# Patient Record
Sex: MALE | Race: BLACK OR AFRICAN AMERICAN | NOT HISPANIC OR LATINO | Employment: OTHER | URBAN - METROPOLITAN AREA
[De-identification: names, ages, dates, MRNs, and addresses within clinical notes are randomized per-mention and may not be internally consistent; named-entity substitution may affect disease eponyms.]

---

## 2017-10-09 ENCOUNTER — APPOINTMENT (OUTPATIENT)
Dept: GENERAL RADIOLOGY | Facility: CLINIC | Age: 57
DRG: 871 | End: 2017-10-09
Attending: EMERGENCY MEDICINE
Payer: COMMERCIAL

## 2017-10-09 ENCOUNTER — HOSPITAL ENCOUNTER (INPATIENT)
Facility: CLINIC | Age: 57
LOS: 8 days | Discharge: HOME | DRG: 871 | End: 2017-10-18
Attending: EMERGENCY MEDICINE | Admitting: INTERNAL MEDICINE
Payer: COMMERCIAL

## 2017-10-09 DIAGNOSIS — K59.00 CONSTIPATION, UNSPECIFIED CONSTIPATION TYPE: ICD-10-CM

## 2017-10-09 DIAGNOSIS — I50.21 ACUTE SYSTOLIC CONGESTIVE HEART FAILURE (H): Primary | ICD-10-CM

## 2017-10-09 DIAGNOSIS — K21.9 GASTROESOPHAGEAL REFLUX DISEASE, ESOPHAGITIS PRESENCE NOT SPECIFIED: ICD-10-CM

## 2017-10-09 DIAGNOSIS — A41.9 SEPTIC SHOCK (H): ICD-10-CM

## 2017-10-09 DIAGNOSIS — R73.9 HYPERGLYCEMIA: ICD-10-CM

## 2017-10-09 DIAGNOSIS — R65.21 SEPTIC SHOCK (H): ICD-10-CM

## 2017-10-09 DIAGNOSIS — J18.9 COMMUNITY ACQUIRED PNEUMONIA OF RIGHT LOWER LOBE OF LUNG: ICD-10-CM

## 2017-10-09 DIAGNOSIS — I50.9 CONGESTIVE HEART FAILURE, UNSPECIFIED CONGESTIVE HEART FAILURE CHRONICITY, UNSPECIFIED CONGESTIVE HEART FAILURE TYPE: ICD-10-CM

## 2017-10-09 DIAGNOSIS — E87.6 HYPOKALEMIA: ICD-10-CM

## 2017-10-09 LAB
ALBUMIN SERPL-MCNC: 3 G/DL (ref 3.4–5)
ALP SERPL-CCNC: 121 U/L (ref 40–150)
ALT SERPL W P-5'-P-CCNC: 13 U/L (ref 0–70)
ANION GAP SERPL CALCULATED.3IONS-SCNC: 12 MMOL/L (ref 3–14)
AST SERPL W P-5'-P-CCNC: 18 U/L (ref 0–45)
BASOPHILS # BLD AUTO: 0 10E9/L (ref 0–0.2)
BASOPHILS NFR BLD AUTO: 0.1 %
BILIRUB SERPL-MCNC: 1.4 MG/DL (ref 0.2–1.3)
BUN SERPL-MCNC: 7 MG/DL (ref 7–30)
CALCIUM SERPL-MCNC: 8.7 MG/DL (ref 8.5–10.1)
CHLORIDE SERPL-SCNC: 98 MMOL/L (ref 94–109)
CO2 BLDCOV-SCNC: 20 MMOL/L (ref 21–28)
CO2 SERPL-SCNC: 22 MMOL/L (ref 20–32)
CREAT SERPL-MCNC: 0.84 MG/DL (ref 0.66–1.25)
DIFFERENTIAL METHOD BLD: ABNORMAL
EOSINOPHIL # BLD AUTO: 0 10E9/L (ref 0–0.7)
EOSINOPHIL NFR BLD AUTO: 0 %
ERYTHROCYTE [DISTWIDTH] IN BLOOD BY AUTOMATED COUNT: 14.7 % (ref 10–15)
ETHANOL SERPL-MCNC: 0.04 G/DL
GFR SERPL CREATININE-BSD FRML MDRD: >90 ML/MIN/1.7M2
GLUCOSE SERPL-MCNC: 130 MG/DL (ref 70–99)
HCT VFR BLD AUTO: 38.1 % (ref 40–53)
HGB BLD-MCNC: 12.9 G/DL (ref 13.3–17.7)
IMM GRANULOCYTES # BLD: 0.3 10E9/L (ref 0–0.4)
IMM GRANULOCYTES NFR BLD: 1.2 %
LACTATE BLD-SCNC: 4.3 MMOL/L (ref 0.7–2.1)
LACTATE SERPL-SCNC: 4.4 MMOL/L (ref 0.4–2)
LYMPHOCYTES # BLD AUTO: 1.1 10E9/L (ref 0.8–5.3)
LYMPHOCYTES NFR BLD AUTO: 5.2 %
MCH RBC QN AUTO: 31.1 PG (ref 26.5–33)
MCHC RBC AUTO-ENTMCNC: 33.9 G/DL (ref 31.5–36.5)
MCV RBC AUTO: 92 FL (ref 78–100)
MONOCYTES # BLD AUTO: 1 10E9/L (ref 0–1.3)
MONOCYTES NFR BLD AUTO: 4.5 %
NEUTROPHILS # BLD AUTO: 18.7 10E9/L (ref 1.6–8.3)
NEUTROPHILS NFR BLD AUTO: 89 %
NRBC # BLD AUTO: 0 10*3/UL
NRBC BLD AUTO-RTO: 0 /100
PCO2 BLDV: 28 MM HG (ref 40–50)
PH BLDV: 7.46 PH (ref 7.32–7.43)
PLATELET # BLD AUTO: 183 10E9/L (ref 150–450)
PO2 BLDV: 36 MM HG (ref 25–47)
POTASSIUM SERPL-SCNC: 3.2 MMOL/L (ref 3.4–5.3)
PROT SERPL-MCNC: 8 G/DL (ref 6.8–8.8)
RBC # BLD AUTO: 4.15 10E12/L (ref 4.4–5.9)
SAO2 % BLDV FROM PO2: 74 %
SODIUM SERPL-SCNC: 132 MMOL/L (ref 133–144)
TROPONIN I SERPL-MCNC: 0.03 UG/L (ref 0–0.04)
WBC # BLD AUTO: 21 10E9/L (ref 4–11)

## 2017-10-09 PROCEDURE — 99285 EMERGENCY DEPT VISIT HI MDM: CPT | Mod: 25

## 2017-10-09 PROCEDURE — 80320 DRUG SCREEN QUANTALCOHOLS: CPT | Performed by: EMERGENCY MEDICINE

## 2017-10-09 PROCEDURE — 36415 COLL VENOUS BLD VENIPUNCTURE: CPT

## 2017-10-09 PROCEDURE — 85025 COMPLETE CBC W/AUTO DIFF WBC: CPT | Performed by: EMERGENCY MEDICINE

## 2017-10-09 PROCEDURE — 82803 BLOOD GASES ANY COMBINATION: CPT

## 2017-10-09 PROCEDURE — 93005 ELECTROCARDIOGRAM TRACING: CPT

## 2017-10-09 PROCEDURE — 25000128 H RX IP 250 OP 636: Performed by: EMERGENCY MEDICINE

## 2017-10-09 PROCEDURE — 84484 ASSAY OF TROPONIN QUANT: CPT | Performed by: EMERGENCY MEDICINE

## 2017-10-09 PROCEDURE — 96361 HYDRATE IV INFUSION ADD-ON: CPT

## 2017-10-09 PROCEDURE — 83605 ASSAY OF LACTIC ACID: CPT | Performed by: EMERGENCY MEDICINE

## 2017-10-09 PROCEDURE — 83605 ASSAY OF LACTIC ACID: CPT

## 2017-10-09 PROCEDURE — 87631 RESP VIRUS 3-5 TARGETS: CPT | Performed by: EMERGENCY MEDICINE

## 2017-10-09 PROCEDURE — 71010 XR CHEST PORT 1 VW: CPT

## 2017-10-09 PROCEDURE — 80053 COMPREHEN METABOLIC PANEL: CPT | Performed by: EMERGENCY MEDICINE

## 2017-10-09 PROCEDURE — 25000132 ZZH RX MED GY IP 250 OP 250 PS 637: Performed by: EMERGENCY MEDICINE

## 2017-10-09 PROCEDURE — 87040 BLOOD CULTURE FOR BACTERIA: CPT | Performed by: EMERGENCY MEDICINE

## 2017-10-09 RX ORDER — ACETAMINOPHEN 500 MG
1000 TABLET ORAL ONCE
Status: COMPLETED | OUTPATIENT
Start: 2017-10-09 | End: 2017-10-09

## 2017-10-09 RX ORDER — LIDOCAINE 40 MG/G
CREAM TOPICAL
Status: DISCONTINUED | OUTPATIENT
Start: 2017-10-09 | End: 2017-10-10

## 2017-10-09 RX ORDER — IPRATROPIUM BROMIDE AND ALBUTEROL SULFATE 2.5; .5 MG/3ML; MG/3ML
SOLUTION RESPIRATORY (INHALATION)
Status: DISCONTINUED
Start: 2017-10-09 | End: 2017-10-09 | Stop reason: WASHOUT

## 2017-10-09 RX ADMIN — SODIUM CHLORIDE 1000 ML: 9 INJECTION, SOLUTION INTRAVENOUS at 23:34

## 2017-10-09 RX ADMIN — SODIUM CHLORIDE 1000 ML: 9 INJECTION, SOLUTION INTRAVENOUS at 22:20

## 2017-10-09 RX ADMIN — ACETAMINOPHEN 1000 MG: 500 TABLET, FILM COATED ORAL at 23:30

## 2017-10-09 NOTE — IP AVS SNAPSHOT
[place] [id number] [facility]    [address]    [facility] [address]    Phone:  [phone]    Fax:  [phone]                                       After Visit Summary   10/9/2017    Alfa Cm    MRN: [id number]           After Visit Summary Signature Page     I have received my discharge instructions, and my questions have been answered. I have discussed any challenges I see with this plan with the nurse or doctor.    ..........................................................................................................................................  Patient/Patient Representative Signature      ..........................................................................................................................................  Patient Representative Print Name and Relationship to Patient    ..................................................               ................................................  Date                                            Time    ..........................................................................................................................................  Reviewed by Signature/Title    ...................................................              ..............................................  Date                                                            Time

## 2017-10-09 NOTE — LETTER
Transition Communication Hand-off for Care Transitions to Next Level of Care Provider    Name: Alfa Cm  MRN #: [id number]  Primary Care Provider: Tulio Arango  Primary Care MD Name: Red Nba  Primary Clinic: [address] [facility] [address]  Primary Care Clinic Name: [facility]  Reason for Hospitalization:  Hypokalemia [E87.6]  Septic shock (H) [A41.9, R65.21]  Community acquired pneumonia of right lower lobe of lung (H) [J18.1]  Admit Date/Time: 10/9/2017  9:59 PM  Discharge Date: 10/18/77  Payor Source: Payor: HUMANA / Plan: HUMANA MEDICARE ADVANTAGE / Product Type: Medicare /     Readmission Assessment Measure (RALF) Risk Score/category: Average    Reason for Communication Hand-off Referral: Admission diagnoses: CHF--NEW    Discharge Plan: Home to self care with spouse     Concern for non-adherence with plan of care:   NO  Discharge Needs Assessment:  Needs       Most Recent Value    Anticipated Changes Related to Illness none    Equipment Currently Used at Home none    # of Referrals Placed by CTS Scheduled Follow-up appointments        Follow-up specialty is recommended: Yes  CARDIOLOGY.    Follow-up plan:  Future Appointments  Date Time Provider Department Center   10/24/2017 9:00 AM Tulio Arango MD [facility]   11/1/2017 2:30 PM Radha Arango APRN CNP [facility]   11/20/2017 8:45 AM Alvarado Carson MD [facility]       Any outstanding tests or procedures:    Procedures     Future Labs/Procedures    Oxygen Adult     Comments:    New Home Oxygen Order 2 liter(s) by nasal cannula continuously at rest and 4 liter(s) by nasal cannula when active with use of portable tank. Expected treatment length is 2 months.. Test on conserving device as applicable.    Patients who qualify for home O2 coverage under the CMS guidelines require ABG tests or O2 sat readings obtained closest to, but no earlier than 2 days prior to the discharge, as evidence of the need for  home oxygen therapy. Testing must be performed while patient is in the chronic stable state. See notes for O2 sats.    I certify that this patient, Alfa Cm has been under my care and that I, or a nurse practitioner or physician's assistant working with me, had a face-to-face encounter that meets the face-to-face encounter requirements with this patient on 10/18/2017. The patient, Alfa Cm was evaluated or treated in whole, or in part, for the following medical condition, which necessitates the use of the ordered oxygen. Treatment Diagnosis: Pneumonia    Attending Provider: Jorge L Munoz DO [facility]  Physician signature: See electronic signature associated with these discharge orders  Date of Order: October 18, 2017                Key Recommendations:  We discussed his PNA and CHF dc. Action plan given and discussed for PNA. Pt states he does not have CHF. He states he is supposed to follow low salt diet. We reviewed importance of following low salt diet with heart conditions and discussed daily weights. Pt was given a scale for home use and instructed on daily weights.  He was DC'd home on new O2. F/U appointment scheduled with Dr. Arango - this will be pt new PCP.    Destiny Ramesh, RN,BSN, CTS  [facility]  Care Coordination  [phone]      AVS/Discharge Summary is the source of truth; this is a helpful guide for improved communication of patient story

## 2017-10-10 PROBLEM — A41.9 SEPSIS (H): Status: ACTIVE | Noted: 2017-10-10

## 2017-10-10 LAB
ALBUMIN SERPL-MCNC: 2.6 G/DL (ref 3.4–5)
ALBUMIN UR-MCNC: 30 MG/DL
ALP SERPL-CCNC: 93 U/L (ref 40–150)
ALT SERPL W P-5'-P-CCNC: 18 U/L (ref 0–70)
ANION GAP SERPL CALCULATED.3IONS-SCNC: 5 MMOL/L (ref 3–14)
ANION GAP SERPL CALCULATED.3IONS-SCNC: 7 MMOL/L (ref 3–14)
APPEARANCE UR: CLEAR
AST SERPL W P-5'-P-CCNC: 42 U/L (ref 0–45)
BILIRUB SERPL-MCNC: 1.8 MG/DL (ref 0.2–1.3)
BILIRUB UR QL STRIP: NEGATIVE
BUN SERPL-MCNC: 7 MG/DL (ref 7–30)
BUN SERPL-MCNC: 7 MG/DL (ref 7–30)
C DIFF TOX B STL QL: NEGATIVE
CALCIUM SERPL-MCNC: 7.3 MG/DL (ref 8.5–10.1)
CALCIUM SERPL-MCNC: 7.7 MG/DL (ref 8.5–10.1)
CHLORIDE SERPL-SCNC: 107 MMOL/L (ref 94–109)
CHLORIDE SERPL-SCNC: 108 MMOL/L (ref 94–109)
CO2 SERPL-SCNC: 22 MMOL/L (ref 20–32)
CO2 SERPL-SCNC: 23 MMOL/L (ref 20–32)
COLOR UR AUTO: YELLOW
CREAT SERPL-MCNC: 0.7 MG/DL (ref 0.66–1.25)
CREAT SERPL-MCNC: 0.78 MG/DL (ref 0.66–1.25)
ERYTHROCYTE [DISTWIDTH] IN BLOOD BY AUTOMATED COUNT: 14.8 % (ref 10–15)
FLUAV+FLUBV RNA SPEC QL NAA+PROBE: NEGATIVE
GFR SERPL CREATININE-BSD FRML MDRD: >90 ML/MIN/1.7M2
GFR SERPL CREATININE-BSD FRML MDRD: >90 ML/MIN/1.7M2
GLUCOSE BLDC GLUCOMTR-MCNC: 107 MG/DL (ref 70–99)
GLUCOSE BLDC GLUCOMTR-MCNC: 112 MG/DL (ref 70–99)
GLUCOSE BLDC GLUCOMTR-MCNC: 150 MG/DL (ref 70–99)
GLUCOSE BLDC GLUCOMTR-MCNC: 205 MG/DL (ref 70–99)
GLUCOSE BLDC GLUCOMTR-MCNC: 229 MG/DL (ref 70–99)
GLUCOSE SERPL-MCNC: 149 MG/DL (ref 70–99)
GLUCOSE SERPL-MCNC: 154 MG/DL (ref 70–99)
GLUCOSE UR STRIP-MCNC: NEGATIVE MG/DL
GRAM STN SPEC: NORMAL
HCT VFR BLD AUTO: 37 % (ref 40–53)
HGB BLD-MCNC: 11.8 G/DL (ref 13.3–17.7)
HGB UR QL STRIP: ABNORMAL
INTERPRETATION ECG - MUSE: NORMAL
KETONES UR STRIP-MCNC: NEGATIVE MG/DL
LACTATE BLD-SCNC: 1.6 MMOL/L (ref 0.7–2)
LACTATE BLD-SCNC: 2.8 MMOL/L (ref 0.7–2)
LACTATE SERPL-SCNC: 2.5 MMOL/L (ref 0.4–2)
LEUKOCYTE ESTERASE UR QL STRIP: NEGATIVE
Lab: NORMAL
MAGNESIUM SERPL-MCNC: 1.3 MG/DL (ref 1.6–2.3)
MAGNESIUM SERPL-MCNC: 2.8 MG/DL (ref 1.6–2.3)
MCH RBC QN AUTO: 30.6 PG (ref 26.5–33)
MCHC RBC AUTO-ENTMCNC: 31.9 G/DL (ref 31.5–36.5)
MCV RBC AUTO: 96 FL (ref 78–100)
MRSA DNA SPEC QL NAA+PROBE: NEGATIVE
MUCOUS THREADS #/AREA URNS LPF: PRESENT /LPF
NITRATE UR QL: NEGATIVE
PH UR STRIP: 6 PH (ref 5–7)
PHOSPHATE SERPL-MCNC: 1 MG/DL (ref 2.5–4.5)
PHOSPHATE SERPL-MCNC: 1.2 MG/DL (ref 2.5–4.5)
PHOSPHATE SERPL-MCNC: 2.4 MG/DL (ref 2.5–4.5)
PLATELET # BLD AUTO: 147 10E9/L (ref 150–450)
POTASSIUM SERPL-SCNC: 3.6 MMOL/L (ref 3.4–5.3)
POTASSIUM SERPL-SCNC: 4.1 MMOL/L (ref 3.4–5.3)
PROT SERPL-MCNC: 7.1 G/DL (ref 6.8–8.8)
RBC # BLD AUTO: 3.85 10E12/L (ref 4.4–5.9)
RBC #/AREA URNS AUTO: 5 /HPF (ref 0–2)
RSV RNA SPEC NAA+PROBE: NEGATIVE
RSV RNA SPEC NAA+PROBE: NEGATIVE
SODIUM SERPL-SCNC: 135 MMOL/L (ref 133–144)
SODIUM SERPL-SCNC: 137 MMOL/L (ref 133–144)
SOURCE: ABNORMAL
SP GR UR STRIP: 1.01 (ref 1–1.03)
SPECIMEN SOURCE: NORMAL
SQUAMOUS #/AREA URNS AUTO: <1 /HPF (ref 0–1)
UROBILINOGEN UR STRIP-MCNC: 2 MG/DL (ref 0–2)
WBC # BLD AUTO: 20.7 10E9/L (ref 4–11)
WBC #/AREA URNS AUTO: 2 /HPF (ref 0–2)

## 2017-10-10 PROCEDURE — 96368 THER/DIAG CONCURRENT INF: CPT

## 2017-10-10 PROCEDURE — 96365 THER/PROPH/DIAG IV INF INIT: CPT

## 2017-10-10 PROCEDURE — 83605 ASSAY OF LACTIC ACID: CPT | Performed by: INTERNAL MEDICINE

## 2017-10-10 PROCEDURE — 25000128 H RX IP 250 OP 636: Performed by: HOSPITALIST

## 2017-10-10 PROCEDURE — 84100 ASSAY OF PHOSPHORUS: CPT | Performed by: INTERNAL MEDICINE

## 2017-10-10 PROCEDURE — 25000125 ZZHC RX 250: Performed by: EMERGENCY MEDICINE

## 2017-10-10 PROCEDURE — 83605 ASSAY OF LACTIC ACID: CPT | Performed by: EMERGENCY MEDICINE

## 2017-10-10 PROCEDURE — 82565 ASSAY OF CREATININE: CPT | Performed by: INTERNAL MEDICINE

## 2017-10-10 PROCEDURE — 40000275 ZZH STATISTIC RCP TIME EA 10 MIN

## 2017-10-10 PROCEDURE — 87493 C DIFF AMPLIFIED PROBE: CPT | Performed by: HOSPITALIST

## 2017-10-10 PROCEDURE — 85027 COMPLETE CBC AUTOMATED: CPT | Performed by: INTERNAL MEDICINE

## 2017-10-10 PROCEDURE — 81001 URINALYSIS AUTO W/SCOPE: CPT | Performed by: EMERGENCY MEDICINE

## 2017-10-10 PROCEDURE — 83735 ASSAY OF MAGNESIUM: CPT | Performed by: HOSPITALIST

## 2017-10-10 PROCEDURE — 25000132 ZZH RX MED GY IP 250 OP 250 PS 637: Performed by: INTERNAL MEDICINE

## 2017-10-10 PROCEDURE — 87641 MR-STAPH DNA AMP PROBE: CPT | Performed by: INTERNAL MEDICINE

## 2017-10-10 PROCEDURE — 36415 COLL VENOUS BLD VENIPUNCTURE: CPT | Performed by: INTERNAL MEDICINE

## 2017-10-10 PROCEDURE — 94640 AIRWAY INHALATION TREATMENT: CPT

## 2017-10-10 PROCEDURE — 99207 ZZC APP CREDIT; MD BILLING SHARED VISIT: CPT | Performed by: HOSPITALIST

## 2017-10-10 PROCEDURE — 36415 COLL VENOUS BLD VENIPUNCTURE: CPT | Performed by: HOSPITALIST

## 2017-10-10 PROCEDURE — 20000003 ZZH R&B ICU

## 2017-10-10 PROCEDURE — 25000128 H RX IP 250 OP 636: Performed by: INTERNAL MEDICINE

## 2017-10-10 PROCEDURE — 83735 ASSAY OF MAGNESIUM: CPT | Performed by: INTERNAL MEDICINE

## 2017-10-10 PROCEDURE — 25000131 ZZH RX MED GY IP 250 OP 636 PS 637: Performed by: HOSPITALIST

## 2017-10-10 PROCEDURE — 87631 RESP VIRUS 3-5 TARGETS: CPT | Performed by: INTERNAL MEDICINE

## 2017-10-10 PROCEDURE — 25000125 ZZHC RX 250: Performed by: INTERNAL MEDICINE

## 2017-10-10 PROCEDURE — 00000146 ZZHCL STATISTIC GLUCOSE BY METER IP

## 2017-10-10 PROCEDURE — 99223 1ST HOSP IP/OBS HIGH 75: CPT | Mod: AI | Performed by: INTERNAL MEDICINE

## 2017-10-10 PROCEDURE — 80053 COMPREHEN METABOLIC PANEL: CPT | Performed by: INTERNAL MEDICINE

## 2017-10-10 PROCEDURE — 94640 AIRWAY INHALATION TREATMENT: CPT | Mod: 76

## 2017-10-10 PROCEDURE — 25000125 ZZHC RX 250: Performed by: HOSPITALIST

## 2017-10-10 PROCEDURE — 87070 CULTURE OTHR SPECIMN AEROBIC: CPT | Performed by: INTERNAL MEDICINE

## 2017-10-10 PROCEDURE — 84100 ASSAY OF PHOSPHORUS: CPT | Performed by: HOSPITALIST

## 2017-10-10 PROCEDURE — 25000128 H RX IP 250 OP 636: Performed by: EMERGENCY MEDICINE

## 2017-10-10 PROCEDURE — 87640 STAPH A DNA AMP PROBE: CPT | Performed by: INTERNAL MEDICINE

## 2017-10-10 PROCEDURE — 80048 BASIC METABOLIC PNL TOTAL CA: CPT | Performed by: HOSPITALIST

## 2017-10-10 PROCEDURE — 87205 SMEAR GRAM STAIN: CPT | Performed by: INTERNAL MEDICINE

## 2017-10-10 PROCEDURE — 25000132 ZZH RX MED GY IP 250 OP 250 PS 637: Performed by: HOSPITALIST

## 2017-10-10 RX ORDER — CLONIDINE HYDROCHLORIDE 0.1 MG/1
0.2 TABLET ORAL 2 TIMES DAILY
Status: DISCONTINUED | OUTPATIENT
Start: 2017-10-10 | End: 2017-10-12

## 2017-10-10 RX ORDER — LABETALOL HYDROCHLORIDE 5 MG/ML
10 INJECTION, SOLUTION INTRAVENOUS
Status: DISCONTINUED | OUTPATIENT
Start: 2017-10-10 | End: 2017-10-18 | Stop reason: HOSPADM

## 2017-10-10 RX ORDER — ASPIRIN 81 MG/1
81 TABLET, CHEWABLE ORAL DAILY
Status: DISCONTINUED | OUTPATIENT
Start: 2017-10-10 | End: 2017-10-18 | Stop reason: HOSPADM

## 2017-10-10 RX ORDER — POTASSIUM CHLORIDE 7.45 MG/ML
10 INJECTION INTRAVENOUS
Status: DISCONTINUED | OUTPATIENT
Start: 2017-10-10 | End: 2017-10-11

## 2017-10-10 RX ORDER — LISINOPRIL 20 MG/1
20 TABLET ORAL DAILY
COMMUNITY
End: 2017-10-31

## 2017-10-10 RX ORDER — PANTOPRAZOLE SODIUM 40 MG/1
40 TABLET, DELAYED RELEASE ORAL EVERY MORNING
Status: DISCONTINUED | OUTPATIENT
Start: 2017-10-10 | End: 2017-10-18 | Stop reason: HOSPADM

## 2017-10-10 RX ORDER — POTASSIUM CL/LIDO/0.9 % NACL 10MEQ/0.1L
10 INTRAVENOUS SOLUTION, PIGGYBACK (ML) INTRAVENOUS
Status: DISCONTINUED | OUTPATIENT
Start: 2017-10-10 | End: 2017-10-18 | Stop reason: HOSPADM

## 2017-10-10 RX ORDER — ALBUTEROL SULFATE 90 UG/1
1-2 AEROSOL, METERED RESPIRATORY (INHALATION) EVERY 6 HOURS PRN
Status: DISCONTINUED | OUTPATIENT
Start: 2017-10-10 | End: 2017-10-18 | Stop reason: HOSPADM

## 2017-10-10 RX ORDER — MULTIVITAMIN,THERAPEUTIC
1 TABLET ORAL DAILY
COMMUNITY

## 2017-10-10 RX ORDER — FOLIC ACID 1 MG/1
1 TABLET ORAL DAILY
Status: DISCONTINUED | OUTPATIENT
Start: 2017-10-10 | End: 2017-10-18 | Stop reason: HOSPADM

## 2017-10-10 RX ORDER — LANOLIN ALCOHOL/MO/W.PET/CERES
100 CREAM (GRAM) TOPICAL DAILY
Status: DISCONTINUED | OUTPATIENT
Start: 2017-10-10 | End: 2017-10-18 | Stop reason: HOSPADM

## 2017-10-10 RX ORDER — POTASSIUM CHLORIDE 29.8 MG/ML
20 INJECTION INTRAVENOUS
Status: DISCONTINUED | OUTPATIENT
Start: 2017-10-10 | End: 2017-10-11

## 2017-10-10 RX ORDER — CEFTRIAXONE 2 G/1
2 INJECTION, POWDER, FOR SOLUTION INTRAMUSCULAR; INTRAVENOUS EVERY 24 HOURS
Status: DISCONTINUED | OUTPATIENT
Start: 2017-10-10 | End: 2017-10-18 | Stop reason: HOSPADM

## 2017-10-10 RX ORDER — POTASSIUM CHLORIDE 1.5 G/1.58G
20-40 POWDER, FOR SOLUTION ORAL
Status: DISCONTINUED | OUTPATIENT
Start: 2017-10-10 | End: 2017-10-18 | Stop reason: HOSPADM

## 2017-10-10 RX ORDER — IPRATROPIUM BROMIDE AND ALBUTEROL SULFATE 2.5; .5 MG/3ML; MG/3ML
3 SOLUTION RESPIRATORY (INHALATION) ONCE
Status: COMPLETED | OUTPATIENT
Start: 2017-10-10 | End: 2017-10-10

## 2017-10-10 RX ORDER — DEXTROSE MONOHYDRATE 25 G/50ML
25-50 INJECTION, SOLUTION INTRAVENOUS
Status: DISCONTINUED | OUTPATIENT
Start: 2017-10-10 | End: 2017-10-18 | Stop reason: HOSPADM

## 2017-10-10 RX ORDER — FERROUS SULFATE 325(65) MG
325 TABLET ORAL
COMMUNITY
End: 2018-01-11

## 2017-10-10 RX ORDER — SODIUM CHLORIDE 9 MG/ML
INJECTION, SOLUTION INTRAVENOUS CONTINUOUS
Status: ACTIVE | OUTPATIENT
Start: 2017-10-10 | End: 2017-10-10

## 2017-10-10 RX ORDER — AMOXICILLIN 250 MG
1-2 CAPSULE ORAL 2 TIMES DAILY PRN
Status: DISCONTINUED | OUTPATIENT
Start: 2017-10-10 | End: 2017-10-18 | Stop reason: HOSPADM

## 2017-10-10 RX ORDER — POTASSIUM CHLORIDE 29.8 MG/ML
20 INJECTION INTRAVENOUS
Status: DISCONTINUED | OUTPATIENT
Start: 2017-10-10 | End: 2017-10-18 | Stop reason: HOSPADM

## 2017-10-10 RX ORDER — LORAZEPAM 0.5 MG/1
.5-1 TABLET ORAL EVERY 4 HOURS PRN
Status: DISCONTINUED | OUTPATIENT
Start: 2017-10-10 | End: 2017-10-18 | Stop reason: HOSPADM

## 2017-10-10 RX ORDER — POTASSIUM CHLORIDE 1.5 G/1.58G
20-40 POWDER, FOR SOLUTION ORAL
Status: DISCONTINUED | OUTPATIENT
Start: 2017-10-10 | End: 2017-10-11

## 2017-10-10 RX ORDER — METOPROLOL TARTRATE 1 MG/ML
5 INJECTION, SOLUTION INTRAVENOUS EVERY 4 HOURS PRN
Status: DISCONTINUED | OUTPATIENT
Start: 2017-10-10 | End: 2017-10-12

## 2017-10-10 RX ORDER — POTASSIUM CHLORIDE 1500 MG/1
20-40 TABLET, EXTENDED RELEASE ORAL
Status: DISCONTINUED | OUTPATIENT
Start: 2017-10-10 | End: 2017-10-11

## 2017-10-10 RX ORDER — LISINOPRIL 20 MG/1
20 TABLET ORAL DAILY
Status: DISCONTINUED | OUTPATIENT
Start: 2017-10-11 | End: 2017-10-10

## 2017-10-10 RX ORDER — ATORVASTATIN CALCIUM 40 MG/1
40 TABLET, FILM COATED ORAL DAILY
Status: DISCONTINUED | OUTPATIENT
Start: 2017-10-10 | End: 2017-10-18 | Stop reason: HOSPADM

## 2017-10-10 RX ORDER — POTASSIUM CL/LIDO/0.9 % NACL 10MEQ/0.1L
10 INTRAVENOUS SOLUTION, PIGGYBACK (ML) INTRAVENOUS
Status: DISCONTINUED | OUTPATIENT
Start: 2017-10-10 | End: 2017-10-11

## 2017-10-10 RX ORDER — HALOPERIDOL 5 MG/ML
2.5-5 INJECTION INTRAMUSCULAR
Status: DISCONTINUED | OUTPATIENT
Start: 2017-10-10 | End: 2017-10-14

## 2017-10-10 RX ORDER — ONDANSETRON 4 MG/1
4 TABLET, ORALLY DISINTEGRATING ORAL EVERY 6 HOURS PRN
Status: DISCONTINUED | OUTPATIENT
Start: 2017-10-10 | End: 2017-10-18 | Stop reason: HOSPADM

## 2017-10-10 RX ORDER — MAGNESIUM SULFATE HEPTAHYDRATE 40 MG/ML
4 INJECTION, SOLUTION INTRAVENOUS EVERY 4 HOURS PRN
Status: DISCONTINUED | OUTPATIENT
Start: 2017-10-10 | End: 2017-10-18 | Stop reason: HOSPADM

## 2017-10-10 RX ORDER — MAGNESIUM SULFATE HEPTAHYDRATE 40 MG/ML
4 INJECTION, SOLUTION INTRAVENOUS EVERY 4 HOURS PRN
Status: DISCONTINUED | OUTPATIENT
Start: 2017-10-10 | End: 2017-10-10

## 2017-10-10 RX ORDER — ALBUTEROL SULFATE 0.83 MG/ML
3 SOLUTION RESPIRATORY (INHALATION)
Status: DISCONTINUED | OUTPATIENT
Start: 2017-10-10 | End: 2017-10-18 | Stop reason: HOSPADM

## 2017-10-10 RX ORDER — LORAZEPAM 2 MG/ML
1-4 INJECTION INTRAMUSCULAR
Status: DISCONTINUED | OUTPATIENT
Start: 2017-10-10 | End: 2017-10-14

## 2017-10-10 RX ORDER — ONDANSETRON 2 MG/ML
4 INJECTION INTRAMUSCULAR; INTRAVENOUS EVERY 6 HOURS PRN
Status: DISCONTINUED | OUTPATIENT
Start: 2017-10-10 | End: 2017-10-18 | Stop reason: HOSPADM

## 2017-10-10 RX ORDER — NICOTINE POLACRILEX 4 MG
15-30 LOZENGE BUCCAL
Status: DISCONTINUED | OUTPATIENT
Start: 2017-10-10 | End: 2017-10-18 | Stop reason: HOSPADM

## 2017-10-10 RX ORDER — POTASSIUM CHLORIDE 1500 MG/1
20-40 TABLET, EXTENDED RELEASE ORAL
Status: DISCONTINUED | OUTPATIENT
Start: 2017-10-10 | End: 2017-10-18 | Stop reason: HOSPADM

## 2017-10-10 RX ORDER — NALOXONE HYDROCHLORIDE 0.4 MG/ML
.1-.4 INJECTION, SOLUTION INTRAMUSCULAR; INTRAVENOUS; SUBCUTANEOUS
Status: DISCONTINUED | OUTPATIENT
Start: 2017-10-10 | End: 2017-10-18 | Stop reason: HOSPADM

## 2017-10-10 RX ORDER — HYDRALAZINE HYDROCHLORIDE 20 MG/ML
10 INJECTION INTRAMUSCULAR; INTRAVENOUS EVERY 4 HOURS PRN
Status: DISCONTINUED | OUTPATIENT
Start: 2017-10-10 | End: 2017-10-18 | Stop reason: HOSPADM

## 2017-10-10 RX ORDER — POTASSIUM CHLORIDE 7.45 MG/ML
10 INJECTION INTRAVENOUS
Status: DISCONTINUED | OUTPATIENT
Start: 2017-10-10 | End: 2017-10-18 | Stop reason: HOSPADM

## 2017-10-10 RX ORDER — LISINOPRIL 20 MG/1
20 TABLET ORAL DAILY
Status: DISCONTINUED | OUTPATIENT
Start: 2017-10-10 | End: 2017-10-18 | Stop reason: HOSPADM

## 2017-10-10 RX ORDER — POLYETHYLENE GLYCOL 3350 17 G/17G
17 POWDER, FOR SOLUTION ORAL DAILY PRN
Status: DISCONTINUED | OUTPATIENT
Start: 2017-10-10 | End: 2017-10-18 | Stop reason: HOSPADM

## 2017-10-10 RX ORDER — PREDNISONE 20 MG/1
40 TABLET ORAL DAILY
Status: DISCONTINUED | OUTPATIENT
Start: 2017-10-10 | End: 2017-10-11

## 2017-10-10 RX ADMIN — MAGNESIUM SULFATE IN WATER 4 G: 40 INJECTION, SOLUTION INTRAVENOUS at 10:23

## 2017-10-10 RX ADMIN — PREDNISONE 40 MG: 20 TABLET ORAL at 08:27

## 2017-10-10 RX ADMIN — CEFTRIAXONE SODIUM 2 G: 2 INJECTION, POWDER, FOR SOLUTION INTRAMUSCULAR; INTRAVENOUS at 02:53

## 2017-10-10 RX ADMIN — ATORVASTATIN CALCIUM 40 MG: 40 TABLET, FILM COATED ORAL at 13:08

## 2017-10-10 RX ADMIN — LISINOPRIL 20 MG: 20 TABLET ORAL at 13:08

## 2017-10-10 RX ADMIN — IPRATROPIUM BROMIDE AND ALBUTEROL SULFATE 3 ML: .5; 3 SOLUTION RESPIRATORY (INHALATION) at 00:48

## 2017-10-10 RX ADMIN — INSULIN ASPART 3 UNITS: 100 INJECTION, SOLUTION INTRAVENOUS; SUBCUTANEOUS at 12:55

## 2017-10-10 RX ADMIN — INSULIN HUMAN 10 UNITS: 100 INJECTION, SUSPENSION SUBCUTANEOUS at 12:55

## 2017-10-10 RX ADMIN — POTASSIUM CHLORIDE 20 MEQ: 1500 TABLET, EXTENDED RELEASE ORAL at 08:27

## 2017-10-10 RX ADMIN — Medication 100 MG: at 08:27

## 2017-10-10 RX ADMIN — FOLIC ACID 1 MG: 1 TABLET ORAL at 08:27

## 2017-10-10 RX ADMIN — ASPIRIN 81 MG CHEWABLE TABLET 81 MG: 81 TABLET CHEWABLE at 08:27

## 2017-10-10 RX ADMIN — VANCOMYCIN HYDROCHLORIDE 2000 MG: 1 INJECTION, POWDER, LYOPHILIZED, FOR SOLUTION INTRAVENOUS at 00:42

## 2017-10-10 RX ADMIN — SODIUM CHLORIDE: 9 INJECTION, SOLUTION INTRAVENOUS at 02:32

## 2017-10-10 RX ADMIN — INSULIN ASPART 4 UNITS: 100 INJECTION, SOLUTION INTRAVENOUS; SUBCUTANEOUS at 16:12

## 2017-10-10 RX ADMIN — ENOXAPARIN SODIUM 40 MG: 40 INJECTION SUBCUTANEOUS at 08:27

## 2017-10-10 RX ADMIN — DOXYLAMINE SUCCINATE 50 MG: 25 TABLET ORAL at 21:50

## 2017-10-10 RX ADMIN — PREDNISONE 40 MG: 20 TABLET ORAL at 02:53

## 2017-10-10 RX ADMIN — ALBUTEROL SULFATE 2.5 MG: 2.5 SOLUTION RESPIRATORY (INHALATION) at 21:00

## 2017-10-10 RX ADMIN — Medication 5 MG: at 21:50

## 2017-10-10 RX ADMIN — POTASSIUM PHOSPHATE, MONOBASIC AND POTASSIUM PHOSPHATE, DIBASIC 15 MMOL: 224; 236 INJECTION, SOLUTION, CONCENTRATE INTRAVENOUS at 10:20

## 2017-10-10 RX ADMIN — POTASSIUM PHOSPHATE, MONOBASIC AND POTASSIUM PHOSPHATE, DIBASIC 20 MMOL: 224; 236 INJECTION, SOLUTION, CONCENTRATE INTRAVENOUS at 22:15

## 2017-10-10 RX ADMIN — SODIUM CHLORIDE 1000 ML: 9 INJECTION, SOLUTION INTRAVENOUS at 00:42

## 2017-10-10 RX ADMIN — ALBUTEROL SULFATE 2.5 MG: 2.5 SOLUTION RESPIRATORY (INHALATION) at 03:36

## 2017-10-10 RX ADMIN — FOLIC ACID: 5 INJECTION, SOLUTION INTRAMUSCULAR; INTRAVENOUS; SUBCUTANEOUS at 12:55

## 2017-10-10 RX ADMIN — SODIUM CHLORIDE: 9 INJECTION, SOLUTION INTRAVENOUS at 10:10

## 2017-10-10 RX ADMIN — CLONIDINE HYDROCHLORIDE 0.2 MG: 0.1 TABLET ORAL at 18:18

## 2017-10-10 RX ADMIN — TAZOBACTAM SODIUM AND PIPERACILLIN SODIUM 4.5 G: 500; 4 INJECTION, SOLUTION INTRAVENOUS at 00:04

## 2017-10-10 RX ADMIN — AZITHROMYCIN MONOHYDRATE 500 MG: 500 INJECTION, POWDER, LYOPHILIZED, FOR SOLUTION INTRAVENOUS at 03:49

## 2017-10-10 RX ADMIN — PANTOPRAZOLE SODIUM 40 MG: 40 TABLET, DELAYED RELEASE ORAL at 08:27

## 2017-10-10 RX ADMIN — IPRATROPIUM BROMIDE AND ALBUTEROL SULFATE 3 ML: .5; 3 SOLUTION RESPIRATORY (INHALATION) at 00:45

## 2017-10-10 ASSESSMENT — ACTIVITIES OF DAILY LIVING (ADL)
BATHING: 0-->INDEPENDENT
SWALLOWING: 0 - SWALLOWS FOODS/LIQUIDS WITHOUT DIFFICULTY
TRANSFERRING: 0-->INDEPENDENT
CHANGE_IN_FUNCTIONAL_STATUS_SINCE_ONSET_OF_CURRENT_ILLNESS/INJURY: NO
BATHING: 0 - INDEPENDENT
TRANSFERRING: 0 - INDEPENDENT
AMBULATION: 0 - INDEPENDENT
SWALLOWING: 0-->SWALLOWS FOODS/LIQUIDS WITHOUT DIFFICULTY
RETIRED_EATING: 0-->INDEPENDENT
TOILETING: 0 - INDEPENDENT
DRESS: 0-->INDEPENDENT
RETIRED_COMMUNICATION: 0-->UNDERSTANDS/COMMUNICATES WITHOUT DIFFICULTY
DRESS: 0 - INDEPENDENT
COGNITION: 0 - NO COGNITION ISSUES REPORTED
EATING: 0 - INDEPENDENT
FALL_HISTORY_WITHIN_LAST_SIX_MONTHS: NO
AMBULATION: 0-->INDEPENDENT
COMMUNICATION: 0 - UNDERSTANDS/COMMUNICATES WITHOUT DIFFICULTY
TOILETING: 0-->INDEPENDENT

## 2017-10-10 NOTE — PROGRESS NOTES
Pt admitted overnight by Dr. Davison please refer to his note for full details of HPI    Pt here with RLL PNA-suspect aspiration PNA based on h/o vomiting prior to onset of symptoms  PT is a heavy drinker and is at high risk for alc w/d  Hyperglycemic due to prednisone     --start CIWA protocol, IV MVI  --dc vanco, cont ceftrioaxone+azithro although will narrow it to unasyn tomorrow once his resp status improves  --may transfer out of ICU today to cardiac bed  --high dose SSI for hyperglycemia

## 2017-10-10 NOTE — ED NOTES
Bed: ED25  Expected date: 10/9/17  Expected time: 9:41 PM  Means of arrival: Ambulance  Comments:  mark Berg  ROOM 25

## 2017-10-10 NOTE — PROGRESS NOTES
"Admitting Hospitalist paged with the following: \"Patient admitted with potassium of 3.2. Dr. James's note stated to supplement K, but there are no orders or replacement protocols ordered. Can you order the replacement protocol please? Thanks, Stephania\"  Awaiting orders.  "

## 2017-10-10 NOTE — H&P
H&P dictated.      Admit to ICU for acute resp failure and sepsis secondary to PNA    Called and d/w intensivist this evening

## 2017-10-10 NOTE — PROGRESS NOTES
"SPIRITUAL HEALTH SERVICES  SPIRITUAL ASSESSMENT Progress Note  [facility] ICU 3rd floor    PRIMARY FOCUS:     Emotional/spiritual/Church distress    Support for coping    ILLNESS CIRCUMSTANCES:   Reviewed documentation. Reflective conversation shared with pt, Alfa, and his wife, Hina, which integrated elements of illness and family narratives.     Context of Serious Illness/Symptom(s) - Alfa shares that he has pneumonia and developed SOB/coughing and came to the hospital.     Resources for Support - Alfa names his wife, Hina, as his main support.     DISTRESS:     Emotional/Existential/Relational Distress - None expressed.    Spiritual/Hinduism Distress - Alfa notes that he is a person of brittany and that his Worship in [place] recently went through a split and that this was challenging for them.     Social/Cultural/Economic Distress - Alfa states that he \"has to get himself together\" and find some \"solid footing.\" He notes that they have challenges with their living situation and finances.     SPIRITUAL/Sikhism COPING:     Restorationism/Brittany - Rastafarian. Attending a Worship but can't recall it's name.     Spiritual Practice(s) - Prayer. Alfa and his wife, Hina, invited prayer and we shared in prayer at his bedside together.     Emotional/Existential/Relational Connections - Alfa and Hina are very close. They have been  4 years and have known one another for 15.   GOALS OF CARE:    Goals of Care - Not discussed.    Meaning/Sense-Making - Alfa emphasized a number of times that he wants to \"get things together\" and that he feels like every time they move forward they end up taking two steps back. Alfa reflected on his growing up in [place], [place] and now returning to [facility]. Alfa does have three sisters and four brothers and a few of them are in the area. He notes that they will live with his sister when they leave the hospital.     PLAN: Will f/u later in the week for ongoing " emotional/spiriutal support. I and the other chaplains remain available per pt/family need or request.       Virgil Burton M.Div.  Staff   Pager [phone]

## 2017-10-10 NOTE — PROGRESS NOTES
ICU End of Shift Summary.  For vital signs and complete assessments, please see documentation flowsheets.     Pertinent assessments: Pt HTN-MD notified-Meds ordered this evening. Adalberto PO. Void adequate. Pt started having diarrhea-will send for cdiff rule out. Able to wean oxygen to RA sats 97% but pt sats drop to 88% while sleeping on RA, so 2L NC while sleeping. Up with SBA to bathroom. No s/s of etoh withdrawal. Frequent requests for sleeping pill tonight-pt states he takes nyquil at home every night to go to sleep. Lytes replaced.   Major Shift Events: See above  Plan (Upcoming Events): Monitor for ETOH withdrawal. Monitor BP and sats.   Discharge/Transfer Needs: Tele overflow    Bedside Shift Report Completed : yes  Bedside Safety Check Completed:yes

## 2017-10-10 NOTE — ED NOTES
Pt reports having SOB and cough for three days with a fever starting last night.  Pt received Duoneb in ambulance.  FSBG 126 in ambulance.

## 2017-10-10 NOTE — ED PROVIDER NOTES
History     Chief Complaint:  Shortness of Breath and Cough       HPI   Alfa Cm is a 57 year old male who presents with cough and dyspnea. Pt reports symptoms have been ongoing for 3 days, but also notes that he had what seemed like a cold almost 3 weeks ago. Pt reports he never got completely better. He notes worsening shortness of breath in the last 3 days and a cough productive of sputum. Denies CP. Reports multiple sick contacts.     Allergies:  Pollen Extract     Medications:    See list in epic.     Patient Active Problem List    Diagnosis Date Noted     ST elevation myocardial infarction involving right coronary artery (H) 09/30/2016     Priority: Medium     Hyperlipidemia 09/30/2016     Priority: Medium     Benign essential hypertension 09/30/2016     Priority: Medium     Coronary artery disease involving native coronary artery 09/30/2016     Priority: Medium     RCA stent 2012         Tobacco dependence syndrome 09/30/2016     Priority: Medium     STEMI (ST elevation myocardial infarction) (H) 09/30/2016     Priority: Medium        Past Surgical History:    Past Surgical History:   Procedure Laterality Date     UT PATIENT HAS A CORONARY ARTERY STENT          Family History:    family history includes DIABETES in his mother.    Social History:   reports that he has been smoking.  He has been smoking about 0.50 packs per day. He does not have any smokeless tobacco history on file. He reports that he drinks about 12.0 oz of alcohol per week  He reports that he does not use illicit drugs.    PCP: Samantha [place]     Review of Systems  Positive for fevers, fatigue, dyspnea, cough, congestion  Negative for chest pain, nausea, vomiting, diarrhea/constipation, confusion  All other systems reviewed and negative.     Physical Exam   Patient Vitals for the past 24 hrs:   BP Temp Temp src Pulse Heart Rate Resp SpO2 Height Weight   10/10/17 0600 (!) 138/92 - - - 105 - 90 % - -   10/10/17 0530 104/80 - - -  "98 - 97 % - -   10/10/17 0430 117/70 - - - 107 - 96 % - -   10/10/17 0400 131/80 - - - 109 - 91 % - -   10/10/17 0336 - - - - - - 95 % - -   10/10/17 0330 133/77 - - - 105 - 94 % - -   10/10/17 0315 - 98  F (36.7  C) Oral - 101 - 94 % - -   10/10/17 0300 (!) 159/92 - - - 116 (!) 32 92 % - -   10/10/17 0230 144/89 - - - 112 - 92 % - -   10/10/17 0212 (!) 138/92 98.2  F (36.8  C) Oral - 111 24 94 % 1.702 m (5' 7\") 91.5 kg (201 lb 11.5 oz)   10/10/17 0200 141/88 98.2  F (36.8  C) Oral - 117 24 90 % - -   10/10/17 0124 - - - - - 20 - - -   10/10/17 0120 - 98.6  F (37  C) Temporal 123 123 - - - -   10/10/17 0045 - - - - - - 94 % - -   10/10/17 0017 - - - - 134 - (!) 89 % - -   10/10/17 0016 - - - 128 128 - (!) 88 % - -   10/10/17 0015 155/82 - - - - - (!) 89 % - -   10/10/17 0014 - - - - 127 - (!) 88 % - -   10/10/17 0013 - - - - 132 - (!) 86 % - -   10/10/17 0012 - - - - 131 - (!) 89 % - -   10/10/17 0011 - - - - - - 90 % - -   10/10/17 0010 - - - - 135 - (!) 89 % - -   10/10/17 0000 150/85 - - - 132 - - - -   10/09/17 2345 156/80 - - - 135 - 93 % - -   10/09/17 2330 135/55 - - - 135 - 91 % - -   10/09/17 2315 112/60 - - - 137 - 93 % - -   10/09/17 2300 165/84 - - - 137 - 91 % - -   10/09/17 2245 138/82 - - - 140 - 98 % - -   10/09/17 2215 156/77 - - - 135 - 93 % - -   10/09/17 2211 155/83 103  F (39.4  C) Oral 140 - (!) 34 97 % 1.702 m (5' 7\") 90.7 kg (200 lb)        Physical Exam  Nursing note and vitals reviewed.  Constitutional: Cooperative.   HENT:   Mouth/Throat: dry mucous membranes.   Eyes: EOMI, nonicteric sclera  Cardiovascular: tachycardic, regular rhythm, no murmurs, rubs, or gallops  Pulmonary/Chest: Tachypnea, bilateral rhonchi, wheezes, mild resp distress  Abdominal: Soft. Nontender, nondistended, no guarding or rigidity. BS present.   Musculoskeletal: Normal range of motion.   Neurological: Alert. Moves all extremities spontaneously.   Skin: Skin is warm and dry. No rash noted.   Psychiatric: Normal mood " and affect.         Emergency Department Course          EKG Interpretation:      Interpreted by Igor Tian  Symptoms at time of EKG: dyspnea   Rhythm: Normal sinus  and Sinus tachycardia  Rate: 130-140  Axis: Left Axis Deviation  Ectopy: None  Conduction: Normal  ST Segments/ T Waves: No ST-T wave changes and No acute ischemic changes  Q Waves: Inferior leads  Comparison to prior: Compared to previous, pt now tachy.     Clinical Impression: sinus tach, left axis deviation, old inferior infarct. Abnormal EKG      Imaging:  Chest  XR, 1 view PORTABLE   Final Result   IMPRESSION:   1. An apparent hazy opacity is present in the right lung base. This   could represent a portion of the right hemidiaphragm, but the   appearance is different than the comparison study dated 9/30/2016 and   therefore a pulmonary opacity such as pneumonia is also possible.   2. No other findings suspicious for active cardiopulmonary disease.      TAYO JOHANSEN MD         Imaging independently reviewed and agree with radiologist interpretation.     Laboratory:  Labs Ordered and Resulted from Time of ED Arrival Up to the Time of Departure from the ED   COMPREHENSIVE METABOLIC PANEL - Abnormal; Notable for the following:        Result Value    Sodium 132 (*)     Potassium 3.2 (*)     Glucose 130 (*)     Bilirubin Total 1.4 (*)     Albumin 3.0 (*)     All other components within normal limits   CBC WITH PLATELETS DIFFERENTIAL - Abnormal; Notable for the following:     WBC 21.0 (*)     RBC Count 4.15 (*)     Hemoglobin 12.9 (*)     Hematocrit 38.1 (*)     Absolute Neutrophil 18.7 (*)     All other components within normal limits   LACTIC ACID - Abnormal; Notable for the following:     Lactic Acid 4.4 (*)     All other components within normal limits   ROUTINE UA WITH MICROSCOPIC REFLEX TO CULTURE - Abnormal; Notable for the following:     Blood Urine Moderate (*)     Protein Albumin Urine 30 (*)     RBC Urine 5 (*)     Mucous Urine  Present (*)     All other components within normal limits   ALCOHOL ETHYL - Abnormal; Notable for the following:     Ethanol g/dL 0.04 (*)     All other components within normal limits   LACTIC ACID - Abnormal; Notable for the following:     Lactic Acid 2.5 (*)     All other components within normal limits   ISTAT  GASES LACTATE WILIAM POCT - Abnormal; Notable for the following:     Ph Venous 7.46 (*)     PCO2 Venous 28 (*)     Bicarbonate Venous 20 (*)     Lactic Acid 4.3 (*)     All other components within normal limits   TROPONIN I   PERIPHERAL IV CATHETER   PULSE OXIMETRY NURSING   CARDIAC CONTINUOUS MONITORING   NURSING DRAW AND HOLD   NURSING DRAW AND HOLD   NURSING DRAW AND HOLD   ISTAT CG4 GASES LACTATE WILIAM NURSING POCT   BLOOD CULTURE   BLOOD CULTURE        Interventions:    Medications   0.9% sodium chloride BOLUS (0 mLs Intravenous Stopped 10/9/17 2334)   piperacillin-tazobactam (ZOSYN) intermittent infusion 4.5 g (0 g Intravenous Stopped 10/10/17 0116)   0.9% sodium chloride BOLUS (0 mLs Intravenous Stopped 10/10/17 0041)   acetaminophen (TYLENOL) tablet 1,000 mg (1,000 mg Oral Given 10/9/17 2330)   vancomycin (VANCOCIN) 2,000 mg in NaCl 0.9 % 500 mL intermittent infusion (2,000 mg Intravenous New Bag 10/10/17 0042)   ipratropium - albuterol 0.5 mg/2.5 mg/3 mL (DUONEB) neb solution 3 mL (3 mLs Nebulization Given 10/10/17 0045)   ipratropium - albuterol 0.5 mg/2.5 mg/3 mL (DUONEB) neb solution 3 mL (3 mLs Nebulization Given 10/10/17 0048)   0.9% sodium chloride BOLUS (1,000 mLs Intravenous New Bag 10/10/17 0042)   azithromycin (ZITHROMAX) 500 mg in NaCl 0.9 % 250 mL intermittent infusion (500 mg Intravenous New Bag 10/10/17 0349)      Impression & Plan      CMS Diagnoses:   The patient has signs of Septic Shock as evidenced by:    1. Presence of Sepsis, AND  2. Lactic Acid level >4    Time sepsis diagnosis confirmed = 2220 as this was the time whenLactate was resulted and the level was >4      3 Hour Septic  Shock Bundle Completion:  1. Initial Lactic Acid Result:   Recent Labs   Lab Test  10/10/17   0520  10/10/17   0057  10/09/17   2220   LACT  2.8*  2.5*  4.3*     2. Blood Cultures before Antibiotics: Yes  3. Broad Spectrum Antibiotics Administered: Yes     4. 4000 ml of IV fluids.    the patient was transferred out of the ED prior to the 6 hour blaine.          Medical Decision Making:Pt presents with dyspnea and fever. Pt with evidence of pneumonia on exam and imaging. Pt quite tachycardic and tachypneic. Pt febrile and tachycardic as well. With a lactic > 4, pt meets criteria for septic shock. Given this, will initiate broad spectrum antibx. Vanc/zosyn started. Pt given tylenol for fever control. Pt was very tachycardic with rates in the 140s on arrival. This improved gradually with fluid rehydration. After 3 L of fluid, HR down into 120s. 4th Liter started. Repeat lactic is coming down as well. Pt requiring O2, but maintaining oxygenation. No indications for bipap. Pt also maintaining blood pressure, and given improvement, will defer on central line at this time. Discussed with Dr. James of the hospitalist service who accepts. All of patient and his wife's questions were answered and they're in agreement with the plan. Given pt's lactate and vital sign abnormalities, will keep him in ICU overnight.       Critical Care Note:    Systems at risk for life threatening failure: Respiratory and Cardiovascular  Associated problems: Acidosis,  Dehydration, Hypoxia, Infection, Sepsis, Shock,   Critical Interventions:  Inhaled bronchodilators,IV Fluids,Oxygen,  Total Time: 45 min (excluding separately billed procedures)   Includes: Bedside management, Cardiac monitor interpretation, Case discussion related to critical care,         Documentation, CXR Interpretation, Multiple re-evaluations        Record review, Test review,   Excludes:EKG interp    Diagnosis:    ICD-10-CM    1. Septic shock (H) A41.9 Influenza A and B and  RSV PCR   2. Community acquired pneumonia of right lower lobe of lung (H) J18.1    3. Hypokalemia E87.6              Igor Tian MD  10/10/17 0702

## 2017-10-10 NOTE — PHARMACY-ADMISSION MEDICATION HISTORY
Admission medication history interview status for this patient is complete. See [facility] admission navigator for allergy information, prior to admission medications and immunization status.     Medication history interview source(s):Patient  Medication history resources (including written lists, pill bottles, clinic record):[facility] list  Primary pharmacy:     Changes made to PTA medication list:  Added: mvi, iron supplement  Deleted: ntg, nicotine  Changed: lisinopril    Actions taken by pharmacist (provider contacted, etc):None     Additional medication history information:None    Medication reconciliation/reorder completed by provider prior to medication history? No    Do you take OTC medications (eg tylenol, ibuprofen, fish oil, eye/ear drops, etc)? Y(Y/N)    For patients on insulin therapy: N (Y/N)  Lantus/levemir/NPH/Mix 70/30 dose:   (Y/N) (see Med list for doses)   Sliding scale Novolog Y/N  If Yes, do you have a baseline novolog pre-meal dose:  units with meals  Patients eat three meals a day:   Y/N    How many episodes of hypoglycemia do you have per week: _______  How many missed doses do you have per week: ______  How many times do you check your blood glucose per day: _______   Any Barriers to therapy - Be specific :  cost of medications, comfortable with giving injections (if applicable), comfortable and confident with current diabetes regimen: Y/N ______________      Prior to Admission medications    Medication Sig Last Dose Taking? Auth Provider   lisinopril (PRINIVIL/ZESTRIL) 20 MG tablet Take 20 mg by mouth daily 10/9/2017 at Unknown time Yes Unknown, Entered By History   multivitamin, therapeutic (THERA-VIT) TABS tablet Take 1 tablet by mouth daily 10/9/2017 at Unknown time Yes Unknown, Entered By History   ferrous sulfate (IRON) 325 (65 FE) MG tablet Take 325 mg by mouth daily (with breakfast) 10/9/2017 at Unknown time Yes Unknown, Entered By History   ASPIRIN ADULT LOW STRENGTH PO Take 81 mg by mouth  daily 10/9/2017 at Unknown time Yes Unknown, Entered By History   ATORVASTATIN CALCIUM PO Take 40 mg by mouth daily 10/9/2017 at Unknown time Yes Unknown, Entered By History   PANTOPRAZOLE SODIUM PO Take 40 mg by mouth every morning  Yes Unknown, Entered By History   albuterol (PROAIR HFA, PROVENTIL HFA, VENTOLIN HFA) 108 (90 BASE) MCG/ACT inhaler Inhale 1-2 puffs into the lungs every 6 hours as needed for shortness of breath / dyspnea or wheezing 10/9/2017 at Unknown time Yes Unknown, Entered By History

## 2017-10-10 NOTE — PHARMACY-VANCOMYCIN DOSING SERVICE
Pharmacy Vancomycin Initial Note  Date of Service October 10, 2017  Patient's  1960  57 year old, male    Indication: Sepsis    Current estimated CrCl = Estimated Creatinine Clearance: 107.2 mL/min (based on Cr of 0.84).    Creatinine for last 3 days  10/9/2017: 10:16 PM Creatinine 0.84 mg/dL    Recent Vancomycin Level(s) for last 3 days  No results found for requested labs within last 72 hours.      Vancomycin IV Administrations (past 72 hours)                   vancomycin (VANCOCIN) 2,000 mg in NaCl 0.9 % 500 mL intermittent infusion (mg) 2,000 mg New Bag 10/10/17 0042                Nephrotoxins and other renal medications (Future)    Start     Dose/Rate Route Frequency Ordered Stop    10/10/17 1200  vancomycin (VANCOCIN) 1,750 mg in NaCl 0.9 % 500 mL intermittent infusion      1,750 mg  over 2 Hours Intravenous EVERY 12 HOURS 10/10/17 0234      10/10/17 0018  vancomycin (VANCOCIN) 2,000 mg in NaCl 0.9 % 500 mL intermittent infusion      2,000 mg  over 2 Hours Intravenous ONCE 10/10/17 0017            Contrast Orders - past 72 hours     None                Plan:  1.  Start vancomycin  1750 mg IV q12h, after a loading dose of 2000mg  2.  Goal Trough Level: 15-20 mg/L   3.  Pharmacy will check trough levels as appropriate in 1-3 Days.    4. Serum creatinine levels will be ordered daily for the first week of therapy and at least twice weekly for subsequent weeks.    5. [place] method utilized to dose vancomycin therapy: Method 2    Leticia [facility]Graft[facility]

## 2017-10-10 NOTE — H&P
CHIEF COMPLAINT:  Shortness of breath, cough, fevers.      HISTORY OF PRESENT ILLNESS:  Mr. Alfa Cm is a very pleasant 57-year-old male with a history of ST elevation myocardial infarction one year ago, history of alcohol dependence and alcohol withdrawal, and hypertension, who presents to the hospital today for the above concerns.  He reports he has had a cough and felt somewhat ill for the past two weeks, but over the past three days his symptoms have intensified.  He is having a cough, fevers, and increasing shortness of breath symptoms.  He came to the ER this evening for his progressive symptoms.  He has not had any recent travel outside of [place].  In regards to his drinking history, he denies any recent episodes of blackouts or passing out related to this, or any potential aspiration-type events.      On arrival to the ER, vital signs notable for blood pressure of 140/80 with a heart rate of 140, temperature 103.0 degrees, respiratory rate in the 30s, and saturation 98% on 4 liters of oxygen.  Workup in the ER included basic labs.  White cell count was 21,000.  Hemoglobin 12.9.  Platelet count normal.  VBG notable for pH of 7.46, pCO2 of 28, with a pO2 of 36.  Lactic acid initially was 4.4.  Troponin was within normal range.  Alcohol level was mildly detectable at 0.04.  Repeat lactic acid was 2.5.  Blood cultures obtained and pending.      Chest x-ray was performed, showing a right lung base opacity, consistent with pneumonia.  I spoke with Dr. Tian of the ER, who is requesting admission of this patient to the Intensive Care Unit for sepsis secondary to pneumonia.      PAST MEDICAL HISTORY:   1.  History of ST elevation myocardial infarction in 09/2016.  The patient underwent a thrombectomy of the right coronary artery.   2.  History of previous right coronary artery stent in 2012.   3.  History of alcohol dependence with alcohol withdrawal.   4.  Hypertension history.   5.  History of  cerebrovascular accident.   6.  Obesity with body mass index of 31.   7.  Active smoker.      CURRENT MEDICATIONS:   1.  Albuterol.   2.  Aspirin.   3.  Atorvastatin.   4.  Lisinopril.   5.  ___.   6.  Nitroglycerin.   7.  Pantoprazole.      Pharmacy has not yet reconciled medication list, so this list may change.      ALLERGIES:  Pollen.      FAMILY HISTORY:  Reviewed.  Nothing contributory to this admission.      SOCIAL HISTORY:  The patient admits to smoking and drinking.  He states he drinks 2-3 alcoholic beverages a day.  Wife is present with him at bedside.      REVIEW OF SYSTEMS:  Please see HPI for details.  Comprehensive greater than 10-point review of systems otherwise negative besides that detailed above.      PHYSICAL EXAM:   VITAL SIGNS:  Blood pressure is currently 145/100 with a heart rate of 125.  Afebrile.  Saturation 94% on 5 liters of oxygen.  Respiratory rate currently measured at 20.   GENERAL:  The patient is awake, alert and oriented x3.  He is a good historian.  He has some conversational dyspnea.  He is a bit diaphoretic.  Mildly toxic-appearing.   HEENT:  Head is atraumatic.  Sclerae white.  Eyelids normal.  Conjunctivae normal.  Extraocular movements are intact.   NECK:  Supple.  No cervical or supraclavicular lymphadenopathy.   HEART:  Tachycardic.  Rhythm is regular.  No significant murmurs.  No lower extremity edema.   LUNGS:  Notable for crackles in the left lung.  Right lung is clear.  No intercostal retractions.  Mild to moderate conversational dyspnea.   ABDOMEN:  Nontender, nondistended.  Soft.  No masses.  No organomegaly.   EXTREMITIES:  No edema.   SKIN:  No rash.  No jaundice.  Skin is dry to touch.   NEUROLOGIC:  Cranial nerves II through XII are intact.  Moves all extremities appropriately.  Sensation intact to light touch in the upper and lower extremities bilaterally.   PSYCHIATRIC:  The patient is awake, alert and oriented x3.  Mood and affect normal and appropriate.       LABORATORY AND IMAGING DATA:  Reviewed above in HPI.      I have personally reviewed the EKG as well, which showed sinus tachycardia with a heart rate of 140.  There is a Q-wave in II, III and aVF, consistent with his previous ST elevation myocardial infarction of the right coronary artery.      IMPRESSION:  Mr. Cm is a 57-year-old male with a history of coronary artery disease, previous ST elevation myocardial infarction one year ago, alcohol dependence, previous alcohol withdrawal, and hypertension, who presents to the hospital today for concerns about cough and fever with increasing shortness of breath.  He initially developed cough several weeks ago, but symptoms intensified for the past three days.  On presentation to the ER, he was noted to be tachycardic, febrile to 103 degrees, and labs notable for leukocytosis with white blood cell count of 21,000, lactic acid initially elevated, and chest x-ray shows an infiltrate.  Clinical findings consistent with sepsis secondary to pneumonia.   1.  Sepsis secondary to community-acquired pneumonia.   2.  Lactic acidosis secondary to above, improved with IV fluid hydration in the ER.   3.  Sinus tachycardia secondary to sepsis.   4.  Mild hypokalemia.   5.  Mild hyponatremia.   6.  History of coronary artery disease, with previous ST elevation myocardial infarction one year ago.   7.  History of alcohol dependence and alcohol withdrawal.      PLAN:   1.  Admit to the Intensive Care Unit for sepsis.   2.  We will treat with Rocephin, and azithromycin, to cover for community-acquired pneumonia, which I suspect this is.  For now, we will also add vancomycin, as he is ill-appearing.  Assuming cultures remain without growth, could likely stop vancomycin within the next 48-72 hours.   3.  IV fluid hydration with normal saline at 125 cc per hour for the next 20 hours.   4.  Prednisone will be ordered 40 mg daily for 5 days in the setting of pneumonia.   5.  Repeat  lactic acid in the morning, along with repeat CBC and complete metabolic panel.   6.  I do not think this patient will require intubation, but we will need to monitor respiratory status closely in the Intensive Care Unit.      PLAN:   1.  Admit to ICU.   2.  Vancomycin, Rocephin and azithromycin as above.   3.  Repeat labs in the morning as above.   4.  Supplement potassium.   5.  Normal saline IV fluid.   6.  Prednisone 40 mg daily for 5 days in the setting of pneumonia and acute hypoxic respiratory failure.   7.  Monitor for alcohol withdrawal in the setting of daily alcohol use and his known history of withdrawal previously.   8.  The patient is FULL CODE.         LARA COMBS MD             D: 10/10/2017 01:31   T: 10/10/2017 02:43   MT: [id number]      Name:     XUAN ARTEAGA   MRN:      [id number]        Account:      [id number]   :      1960           Admitted:     [id number]      Document: [id number]       cc: Josy Butler MD

## 2017-10-10 NOTE — PLAN OF CARE
Problem: Patient Care Overview  Goal: Plan of Care/Patient Progress Review  Outcome: No Change  ICU End of Shift Summary.  For vital signs and complete assessments, please see documentation flowsheets.      Pertinent assessments: A&O x4. Tele sinus tach with PVCs. Afebrile since transfer from ER. Good UOP.   Major Shift Events: SOB at rest. PRN albuterol effect for SOB. O2 low to mid 90s on 5LPM per NC.    Plan (Upcoming Events): Wean O2 as able; monitor SOB; monitor for withdrawal symptoms.  Discharge/Transfer Needs: TBD     Bedside Shift Report Completed :   Bedside Safety Check Completed:

## 2017-10-11 ENCOUNTER — APPOINTMENT (OUTPATIENT)
Dept: CARDIOLOGY | Facility: CLINIC | Age: 57
DRG: 871 | End: 2017-10-11
Attending: HOSPITALIST
Payer: COMMERCIAL

## 2017-10-11 ENCOUNTER — APPOINTMENT (OUTPATIENT)
Dept: GENERAL RADIOLOGY | Facility: CLINIC | Age: 57
DRG: 871 | End: 2017-10-11
Attending: HOSPITALIST
Payer: COMMERCIAL

## 2017-10-11 LAB
ANION GAP SERPL CALCULATED.3IONS-SCNC: 5 MMOL/L (ref 3–14)
BUN SERPL-MCNC: 9 MG/DL (ref 7–30)
CALCIUM SERPL-MCNC: 7.4 MG/DL (ref 8.5–10.1)
CHLORIDE SERPL-SCNC: 106 MMOL/L (ref 94–109)
CO2 SERPL-SCNC: 23 MMOL/L (ref 20–32)
CREAT SERPL-MCNC: 0.7 MG/DL (ref 0.66–1.25)
ERYTHROCYTE [DISTWIDTH] IN BLOOD BY AUTOMATED COUNT: 15 % (ref 10–15)
GFR SERPL CREATININE-BSD FRML MDRD: >90 ML/MIN/1.7M2
GLUCOSE BLDC GLUCOMTR-MCNC: 104 MG/DL (ref 70–99)
GLUCOSE BLDC GLUCOMTR-MCNC: 113 MG/DL (ref 70–99)
GLUCOSE BLDC GLUCOMTR-MCNC: 123 MG/DL (ref 70–99)
GLUCOSE BLDC GLUCOMTR-MCNC: 182 MG/DL (ref 70–99)
GLUCOSE BLDC GLUCOMTR-MCNC: 188 MG/DL (ref 70–99)
GLUCOSE SERPL-MCNC: 105 MG/DL (ref 70–99)
HBA1C MFR BLD: 5.6 % (ref 4.3–6)
HCT VFR BLD AUTO: 35.7 % (ref 40–53)
HGB BLD-MCNC: 11.7 G/DL (ref 13.3–17.7)
MCH RBC QN AUTO: 31.3 PG (ref 26.5–33)
MCHC RBC AUTO-ENTMCNC: 32.8 G/DL (ref 31.5–36.5)
MCV RBC AUTO: 96 FL (ref 78–100)
NT-PROBNP SERPL-MCNC: 4368 PG/ML (ref 0–900)
PHOSPHATE SERPL-MCNC: 2 MG/DL (ref 2.5–4.5)
PLATELET # BLD AUTO: 139 10E9/L (ref 150–450)
POTASSIUM SERPL-SCNC: 4.2 MMOL/L (ref 3.4–5.3)
RBC # BLD AUTO: 3.74 10E12/L (ref 4.4–5.9)
SODIUM SERPL-SCNC: 134 MMOL/L (ref 133–144)
WBC # BLD AUTO: 21.6 10E9/L (ref 4–11)

## 2017-10-11 PROCEDURE — 93306 TTE W/DOPPLER COMPLETE: CPT | Mod: 26 | Performed by: INTERNAL MEDICINE

## 2017-10-11 PROCEDURE — 25000125 ZZHC RX 250: Performed by: HOSPITALIST

## 2017-10-11 PROCEDURE — 83880 ASSAY OF NATRIURETIC PEPTIDE: CPT | Performed by: HOSPITALIST

## 2017-10-11 PROCEDURE — 36415 COLL VENOUS BLD VENIPUNCTURE: CPT | Performed by: HOSPITALIST

## 2017-10-11 PROCEDURE — 71010 XR CHEST PORT 1 VW: CPT

## 2017-10-11 PROCEDURE — 00000146 ZZHCL STATISTIC GLUCOSE BY METER IP

## 2017-10-11 PROCEDURE — 25000132 ZZH RX MED GY IP 250 OP 250 PS 637: Performed by: HOSPITALIST

## 2017-10-11 PROCEDURE — 40000275 ZZH STATISTIC RCP TIME EA 10 MIN

## 2017-10-11 PROCEDURE — 25000128 H RX IP 250 OP 636: Performed by: INTERNAL MEDICINE

## 2017-10-11 PROCEDURE — 99233 SBSQ HOSP IP/OBS HIGH 50: CPT | Performed by: HOSPITALIST

## 2017-10-11 PROCEDURE — 40000809 ZZH STATISTIC NO DOCUMENTATION TO SUPPORT CHARGE

## 2017-10-11 PROCEDURE — 83036 HEMOGLOBIN GLYCOSYLATED A1C: CPT | Performed by: HOSPITALIST

## 2017-10-11 PROCEDURE — 20000003 ZZH R&B ICU

## 2017-10-11 PROCEDURE — 94640 AIRWAY INHALATION TREATMENT: CPT | Mod: 76

## 2017-10-11 PROCEDURE — 40000264 ECHO COMPLETE WITH LUMASON

## 2017-10-11 PROCEDURE — 84100 ASSAY OF PHOSPHORUS: CPT | Performed by: HOSPITALIST

## 2017-10-11 PROCEDURE — 25500064 ZZH RX 255 OP 636: Performed by: INTERNAL MEDICINE

## 2017-10-11 PROCEDURE — 25000128 H RX IP 250 OP 636: Performed by: HOSPITALIST

## 2017-10-11 PROCEDURE — 94640 AIRWAY INHALATION TREATMENT: CPT

## 2017-10-11 PROCEDURE — 94660 CPAP INITIATION&MGMT: CPT

## 2017-10-11 PROCEDURE — 80048 BASIC METABOLIC PNL TOTAL CA: CPT | Performed by: HOSPITALIST

## 2017-10-11 PROCEDURE — 25000132 ZZH RX MED GY IP 250 OP 250 PS 637: Performed by: INTERNAL MEDICINE

## 2017-10-11 PROCEDURE — 85027 COMPLETE CBC AUTOMATED: CPT | Performed by: HOSPITALIST

## 2017-10-11 PROCEDURE — 25000125 ZZHC RX 250: Performed by: INTERNAL MEDICINE

## 2017-10-11 RX ORDER — MULTIVITAMIN,THERAPEUTIC
1 TABLET ORAL DAILY
Status: DISCONTINUED | OUTPATIENT
Start: 2017-10-11 | End: 2017-10-18 | Stop reason: HOSPADM

## 2017-10-11 RX ORDER — FUROSEMIDE 10 MG/ML
20 INJECTION INTRAMUSCULAR; INTRAVENOUS 3 TIMES DAILY
Status: DISCONTINUED | OUTPATIENT
Start: 2017-10-11 | End: 2017-10-13

## 2017-10-11 RX ORDER — METHYLPREDNISOLONE SODIUM SUCCINATE 40 MG/ML
40 INJECTION, POWDER, LYOPHILIZED, FOR SOLUTION INTRAMUSCULAR; INTRAVENOUS EVERY 6 HOURS
Status: DISCONTINUED | OUTPATIENT
Start: 2017-10-11 | End: 2017-10-12

## 2017-10-11 RX ORDER — IPRATROPIUM BROMIDE AND ALBUTEROL SULFATE 2.5; .5 MG/3ML; MG/3ML
3 SOLUTION RESPIRATORY (INHALATION)
Status: DISCONTINUED | OUTPATIENT
Start: 2017-10-11 | End: 2017-10-18 | Stop reason: HOSPADM

## 2017-10-11 RX ADMIN — FUROSEMIDE 20 MG: 10 INJECTION, SOLUTION INTRAMUSCULAR; INTRAVENOUS at 10:17

## 2017-10-11 RX ADMIN — IPRATROPIUM BROMIDE AND ALBUTEROL SULFATE 3 ML: .5; 3 SOLUTION RESPIRATORY (INHALATION) at 11:40

## 2017-10-11 RX ADMIN — METHYLPREDNISOLONE SODIUM SUCCINATE 40 MG: 40 INJECTION, POWDER, FOR SOLUTION INTRAMUSCULAR; INTRAVENOUS at 15:32

## 2017-10-11 RX ADMIN — INSULIN ASPART 2 UNITS: 100 INJECTION, SOLUTION INTRAVENOUS; SUBCUTANEOUS at 17:00

## 2017-10-11 RX ADMIN — CLONIDINE HYDROCHLORIDE 0.2 MG: 0.1 TABLET ORAL at 20:23

## 2017-10-11 RX ADMIN — POTASSIUM PHOSPHATE, MONOBASIC AND POTASSIUM PHOSPHATE, DIBASIC 15 MMOL: 224; 236 INJECTION, SOLUTION, CONCENTRATE INTRAVENOUS at 07:03

## 2017-10-11 RX ADMIN — FUROSEMIDE 20 MG: 10 INJECTION, SOLUTION INTRAMUSCULAR; INTRAVENOUS at 15:32

## 2017-10-11 RX ADMIN — ALBUTEROL SULFATE 2.5 MG: 2.5 SOLUTION RESPIRATORY (INHALATION) at 03:34

## 2017-10-11 RX ADMIN — THERA TABS 1 TABLET: TAB at 08:43

## 2017-10-11 RX ADMIN — ATORVASTATIN CALCIUM 40 MG: 40 TABLET, FILM COATED ORAL at 08:43

## 2017-10-11 RX ADMIN — AZITHROMYCIN MONOHYDRATE 250 MG: 500 INJECTION, POWDER, LYOPHILIZED, FOR SOLUTION INTRAVENOUS at 02:39

## 2017-10-11 RX ADMIN — PREDNISONE 40 MG: 20 TABLET ORAL at 08:42

## 2017-10-11 RX ADMIN — INSULIN HUMAN 10 UNITS: 100 INJECTION, SUSPENSION SUBCUTANEOUS at 08:33

## 2017-10-11 RX ADMIN — FUROSEMIDE 20 MG: 10 INJECTION, SOLUTION INTRAMUSCULAR; INTRAVENOUS at 22:02

## 2017-10-11 RX ADMIN — CEFTRIAXONE SODIUM 2 G: 2 INJECTION, POWDER, FOR SOLUTION INTRAMUSCULAR; INTRAVENOUS at 01:35

## 2017-10-11 RX ADMIN — ASPIRIN 81 MG CHEWABLE TABLET 81 MG: 81 TABLET CHEWABLE at 08:43

## 2017-10-11 RX ADMIN — IPRATROPIUM BROMIDE AND ALBUTEROL SULFATE 3 ML: .5; 3 SOLUTION RESPIRATORY (INHALATION) at 19:34

## 2017-10-11 RX ADMIN — FOLIC ACID 1 MG: 1 TABLET ORAL at 08:43

## 2017-10-11 RX ADMIN — LISINOPRIL 20 MG: 20 TABLET ORAL at 08:42

## 2017-10-11 RX ADMIN — SULFUR HEXAFLUORIDE 2 ML: KIT at 15:08

## 2017-10-11 RX ADMIN — CLONIDINE HYDROCHLORIDE 0.2 MG: 0.1 TABLET ORAL at 06:12

## 2017-10-11 RX ADMIN — IPRATROPIUM BROMIDE AND ALBUTEROL SULFATE 3 ML: .5; 3 SOLUTION RESPIRATORY (INHALATION) at 15:51

## 2017-10-11 RX ADMIN — Medication 100 MG: at 08:43

## 2017-10-11 RX ADMIN — PANTOPRAZOLE SODIUM 40 MG: 40 TABLET, DELAYED RELEASE ORAL at 08:42

## 2017-10-11 RX ADMIN — METHYLPREDNISOLONE SODIUM SUCCINATE 40 MG: 40 INJECTION, POWDER, FOR SOLUTION INTRAMUSCULAR; INTRAVENOUS at 22:00

## 2017-10-11 RX ADMIN — DOXYLAMINE SUCCINATE 50 MG: 25 TABLET ORAL at 21:57

## 2017-10-11 RX ADMIN — ALBUTEROL SULFATE 2.5 MG: 2.5 SOLUTION RESPIRATORY (INHALATION) at 03:22

## 2017-10-11 RX ADMIN — Medication 5 MG: at 21:57

## 2017-10-11 RX ADMIN — ALBUTEROL SULFATE 2.5 MG: 2.5 SOLUTION RESPIRATORY (INHALATION) at 08:04

## 2017-10-11 RX ADMIN — ENOXAPARIN SODIUM 40 MG: 40 INJECTION SUBCUTANEOUS at 08:42

## 2017-10-11 NOTE — PROGRESS NOTES
RT Note:    RT notified by RN that patient having increased WOB and drop in SPO2. RT assessed pt. Patient tachypneic, using accessory muscles and stating he can't breath. Unable to keep SPO2 >90% on 15 lpm oxymask. BS tight with expiratory wheezes/coarse/crackles. PRN albuterol given with improvement so additional Albuterol prn nebulizer given with more improvement. MD notified and order received to place patient on BIPAP. Patient setup on BIPAP 12/6 50%, tolerating well. Patient much improved after nebulizers and initiation of BIPAP. RT will continue to monitor.    Ivanna Jackson,  10/11/2017, 3:50 AM

## 2017-10-11 NOTE — PLAN OF CARE
Problem: Pneumonia (Adult)  Goal: Signs and Symptoms of Listed Potential Problems Will be Absent, Minimized or Managed (Pneumonia)  Signs and symptoms of listed potential problems will be absent, minimized or managed by discharge/transition of care (reference Pneumonia (Adult) CPG).   Outcome: No Change  ICU End of Shift Summary.  For vital signs and complete assessments, please see documentation flowsheets.      Pertinent assessments: Alert/oriented x4. Lung sounds coarse throughout. Oxygen saturations low 90's on 2-4 LPM. Trace edema bilaterally. Diarrhea continues, C. Diff negative. IV saline locked. Ambulating with SBA. Round, firm abdomen. CIWA scores 3, 1, and 2. Blood glucose stable. Phosphorus recheck was 1.2, re-replaced this shift.   Major Shift Events: 0300 patient up to bathroom, once back in bed had increased work for breathing and unable to maintain saturations > 90%. RT and MD notified, nebulizer administered and BiPAP initiated.   Plan (Upcoming Events): Continue IV ABX, blood glucose monitoring, CIWA, and observe oxygenation needs.   Discharge/Transfer Needs: N/A     Bedside Shift Report Completed :   Bedside Safety Check Completed:

## 2017-10-11 NOTE — PLAN OF CARE
Problem: Patient Care Overview  Goal: Plan of Care/Patient Progress Review  Outcome: No Change  ICU End of Shift Summary.  For vital signs and complete assessments, please see documentation flowsheets.      Pertinent assessments: AOX4, CIWA Nil,Tele-SR, LS dim/coarse with productive cough, on BIPAP Fi02 50%, VITAL, Spo2 82-84% on 4L NC, Tachypneac RR 24-34, GI/ on lasix, diarrhea x 1.  Major Shift Events: Echo, CXR, lasix TID   Plan (Upcoming Events): monitor respiratory status,labs  Discharge/Transfer Needs: TBD     Bedside Shift Report Completed : yes  Bedside Safety Check Completed: yes

## 2017-10-11 NOTE — PROGRESS NOTES
[facility]  Hospitalist Progress Note  Patient Name: Alfa Cm    MRN: [id number]  Provider:  Bailey Moya MD  Date:10/11/2017      Initial presenting complaint/issue to hospital (Diagnosis): cough    Summary of Stay: Mr. Cm is a 57-year-old male with a history of coronary artery disease, previous ST elevation myocardial infarction one year ago, alcohol dependence, previous alcohol withdrawal, and hypertension, who presents to the hospital today for concerns about cough and fever with increasing shortness of breath.  He initially developed cough several weeks ago, but symptoms intensified for the past three days.  On presentation to the ER, he was noted to be tachycardic, febrile to 103 degrees, and labs notable for leukocytosis with white blood cell count of 21,000, lactic acid initially elevated, and chest x-ray shows an infiltrate.  Clinical findings consistent with sepsis secondary to pneumonia.        Assessment and Plan:      1.  Sepsis secondary to possible aspiration pneumonia. Needing intermittent BIPAP, worsened since admission suspect 2/2 volume overload  --cont azithromycin and ceftriaxone  --cont BIPAP intermittently as needed  --start IV lasix 20 mg TID  --TTE today    2.  Lactic acidosis secondary to above, improved with IV fluid hydration in the ER.   3.  Sinus tachycardia secondary to sepsis.   4.  Mild hypokalemia. improved  5.  Mild hyponatremia. improved  6.  History of coronary artery disease, with previous ST elevation myocardial infarction one year ago. TTE today given e/o CHF  7.  History of alcohol dependence and alcohol withdrawal. No e/o withdrawal yet . Cont MVI      DVT Prophylaxis:  -  lovenox  Code Status: Full Code  Discharge Dispo: TBD  Estimated Disch Date / # of Days until Discharge: 3-4 days         Interval History:      Feels better this AM but was very SOB last night  Slept with BIPAP on last night  No s/s of w/d     Data reviewed today: I  "reviewed all new labs and imaging reports over the last 24 hours. I personally reviewed no images or EKG's today.         Physical Exam:      Last Vital Signs:  /84  Pulse 83  Temp 97.2  F (36.2  C) (Axillary)  Resp 20  Ht 1.702 m (5' 7\")  Wt 98.2 kg (216 lb 7.9 oz)  SpO2 93%  BMI 33.91 kg/m2   GENERAL: No apparent distress. Awake, alert, and fully oriented.  HEENT: Normocephalic, atraumatic. Extraocular movements intact.  CARDIOVASCULAR: Regular rate and rhythm without murmurs or rubs. No S3.  PULMONARY: b/l wheezing and crackles   ABDOMINAL: Soft, non-tender, non-distended. Bowel sounds normoactive. No hepatosplenomegaly.  EXTREMITIES: No cyanosis or clubbing. No edema.  NEUROLOGICAL: CN 2-12 grossly intact, no focal neurological deficits.  DERMATOLOGICAL: No rash, ulcer, ecchymoses, jaundice.  PSYCH: appropriate           Medications:      All current medications were reviewed.         Data:      All new lab and imaging data was reviewed.   Data       Recent Labs  Lab 10/11/17  0510 10/10/17  0520 10/09/17  2216   WBC 21.6* 20.7* 21.0*   HGB 11.7* 11.8* 12.9*   HCT 35.7* 37.0* 38.1*   MCV 96 96 92   * 147* 183       Recent Labs  Lab 10/11/17  0510 10/10/17  1755 10/10/17  0520    135 137   POTASSIUM 4.2 4.1 3.6   CHLORIDE 106 107 108   CO2 23 23 22   ANIONGAP 5 5 7   * 154* 149*   BUN 9 7 7   CR 0.70 0.70 0.78   GFRESTIMATED >90 >90 >90   GFRESTBLACK >90 >90 >90   GRANT 7.4* 7.7* 7.3*       Recent Results (from the past 24 hour(s))   XR Chest Port 1 View    Narrative    CHEST PORTABLE ONE VIEW 10/11/2017 9:47 AM     HISTORY: Hypoxia.    COMPARISON: 10/9/2017.      Impression    IMPRESSION: Interval development of diffuse bilateral pulmonary  infiltrates. Heart size appears enlarged but this may be related to  the AP portable technique alone.       Bailey Moya MD  Hospitalist  [facility] [address] [place] [address]  "

## 2017-10-11 NOTE — PROGRESS NOTES
Respiratory Therapy    Patient seen resting in bed on BiPAP settings:     Mode: S/T   IPAP/EPAP: 12/6  Rate: 12  PS: 6  FiO2: 50%     Patient taken off briefly and put on 4 LPM nasal cannula but desaturates. Respiratory rate 20-30s and breath sounds coarse/expiratory wheezes, and diminished, and SpO2 96%. Patient will continue to receive Duoneb QID through BiPAP. RT to follow.    Danielle Owen  October 11, 2017, 5:10 PM

## 2017-10-11 NOTE — PLAN OF CARE
Problem: Pneumonia (Adult)  Goal: Signs and Symptoms of Listed Potential Problems Will be Absent, Minimized or Managed (Pneumonia)  Signs and symptoms of listed potential problems will be absent, minimized or managed by discharge/transition of care (reference Pneumonia (Adult) CPG).   Pt continues on BIPAP, SPo2  on 4L NC

## 2017-10-11 NOTE — PROGRESS NOTES
Pt required BIPAP overnight for increased WOB    CXR is significantly worsened- suspect pulm edema -? ARDS vs cardiogenic  Weight is up 16 lbs since admission!!    Check pro BNP, TTE and start diuresis  Schedule nebs and switch to IV steroids  No IVF  Will keep him in the ICU incase of ongoing need for BIPAP

## 2017-10-12 LAB
ANION GAP SERPL CALCULATED.3IONS-SCNC: 6 MMOL/L (ref 3–14)
BACTERIA SPEC CULT: NORMAL
BUN SERPL-MCNC: 12 MG/DL (ref 7–30)
CALCIUM SERPL-MCNC: 7.9 MG/DL (ref 8.5–10.1)
CHLORIDE SERPL-SCNC: 106 MMOL/L (ref 94–109)
CO2 SERPL-SCNC: 27 MMOL/L (ref 20–32)
CREAT SERPL-MCNC: 0.77 MG/DL (ref 0.66–1.25)
ERYTHROCYTE [DISTWIDTH] IN BLOOD BY AUTOMATED COUNT: 14.8 % (ref 10–15)
GFR SERPL CREATININE-BSD FRML MDRD: >90 ML/MIN/1.7M2
GLUCOSE BLDC GLUCOMTR-MCNC: 137 MG/DL (ref 70–99)
GLUCOSE BLDC GLUCOMTR-MCNC: 139 MG/DL (ref 70–99)
GLUCOSE BLDC GLUCOMTR-MCNC: 148 MG/DL (ref 70–99)
GLUCOSE BLDC GLUCOMTR-MCNC: 166 MG/DL (ref 70–99)
GLUCOSE BLDC GLUCOMTR-MCNC: 185 MG/DL (ref 70–99)
GLUCOSE SERPL-MCNC: 137 MG/DL (ref 70–99)
HCT VFR BLD AUTO: 34.4 % (ref 40–53)
HGB BLD-MCNC: 11.4 G/DL (ref 13.3–17.7)
MAGNESIUM SERPL-MCNC: 2.6 MG/DL (ref 1.6–2.3)
MCH RBC QN AUTO: 31 PG (ref 26.5–33)
MCHC RBC AUTO-ENTMCNC: 33.1 G/DL (ref 31.5–36.5)
MCV RBC AUTO: 94 FL (ref 78–100)
PHOSPHATE SERPL-MCNC: 2.4 MG/DL (ref 2.5–4.5)
PLATELET # BLD AUTO: 163 10E9/L (ref 150–450)
POTASSIUM SERPL-SCNC: 4.1 MMOL/L (ref 3.4–5.3)
RBC # BLD AUTO: 3.68 10E12/L (ref 4.4–5.9)
SODIUM SERPL-SCNC: 139 MMOL/L (ref 133–144)
SPECIMEN SOURCE: NORMAL
TROPONIN I SERPL-MCNC: <0.015 UG/L (ref 0–0.04)
WBC # BLD AUTO: 15.4 10E9/L (ref 4–11)

## 2017-10-12 PROCEDURE — 25000132 ZZH RX MED GY IP 250 OP 250 PS 637: Performed by: HOSPITALIST

## 2017-10-12 PROCEDURE — 90686 IIV4 VACC NO PRSV 0.5 ML IM: CPT | Performed by: INTERNAL MEDICINE

## 2017-10-12 PROCEDURE — 20000003 ZZH R&B ICU

## 2017-10-12 PROCEDURE — 84484 ASSAY OF TROPONIN QUANT: CPT | Performed by: HOSPITALIST

## 2017-10-12 PROCEDURE — 99221 1ST HOSP IP/OBS SF/LOW 40: CPT | Performed by: INTERNAL MEDICINE

## 2017-10-12 PROCEDURE — 25000125 ZZHC RX 250: Performed by: INTERNAL MEDICINE

## 2017-10-12 PROCEDURE — 84100 ASSAY OF PHOSPHORUS: CPT | Performed by: HOSPITALIST

## 2017-10-12 PROCEDURE — 90732 PPSV23 VACC 2 YRS+ SUBQ/IM: CPT | Performed by: INTERNAL MEDICINE

## 2017-10-12 PROCEDURE — 94640 AIRWAY INHALATION TREATMENT: CPT

## 2017-10-12 PROCEDURE — 25000125 ZZHC RX 250: Performed by: HOSPITALIST

## 2017-10-12 PROCEDURE — 99233 SBSQ HOSP IP/OBS HIGH 50: CPT | Performed by: INTERNAL MEDICINE

## 2017-10-12 PROCEDURE — 94640 AIRWAY INHALATION TREATMENT: CPT | Mod: 76

## 2017-10-12 PROCEDURE — 40000275 ZZH STATISTIC RCP TIME EA 10 MIN

## 2017-10-12 PROCEDURE — 00000146 ZZHCL STATISTIC GLUCOSE BY METER IP

## 2017-10-12 PROCEDURE — 85027 COMPLETE CBC AUTOMATED: CPT | Performed by: HOSPITALIST

## 2017-10-12 PROCEDURE — 25000128 H RX IP 250 OP 636: Performed by: HOSPITALIST

## 2017-10-12 PROCEDURE — 83735 ASSAY OF MAGNESIUM: CPT | Performed by: HOSPITALIST

## 2017-10-12 PROCEDURE — 25000132 ZZH RX MED GY IP 250 OP 250 PS 637: Performed by: INTERNAL MEDICINE

## 2017-10-12 PROCEDURE — 25000128 H RX IP 250 OP 636: Performed by: INTERNAL MEDICINE

## 2017-10-12 PROCEDURE — 80048 BASIC METABOLIC PNL TOTAL CA: CPT | Performed by: HOSPITALIST

## 2017-10-12 PROCEDURE — 36415 COLL VENOUS BLD VENIPUNCTURE: CPT | Performed by: HOSPITALIST

## 2017-10-12 PROCEDURE — 94660 CPAP INITIATION&MGMT: CPT

## 2017-10-12 RX ORDER — METOPROLOL TARTRATE 25 MG/1
25 TABLET, FILM COATED ORAL 2 TIMES DAILY
Status: DISCONTINUED | OUTPATIENT
Start: 2017-10-12 | End: 2017-10-12

## 2017-10-12 RX ORDER — CARVEDILOL 12.5 MG/1
12.5 TABLET ORAL 2 TIMES DAILY WITH MEALS
Status: DISCONTINUED | OUTPATIENT
Start: 2017-10-12 | End: 2017-10-15

## 2017-10-12 RX ORDER — METHYLPREDNISOLONE SODIUM SUCCINATE 40 MG/ML
40 INJECTION, POWDER, LYOPHILIZED, FOR SOLUTION INTRAMUSCULAR; INTRAVENOUS EVERY 8 HOURS
Status: DISCONTINUED | OUTPATIENT
Start: 2017-10-12 | End: 2017-10-13

## 2017-10-12 RX ADMIN — AZITHROMYCIN MONOHYDRATE 250 MG: 500 INJECTION, POWDER, LYOPHILIZED, FOR SOLUTION INTRAVENOUS at 02:22

## 2017-10-12 RX ADMIN — CEFTRIAXONE SODIUM 2 G: 2 INJECTION, POWDER, FOR SOLUTION INTRAMUSCULAR; INTRAVENOUS at 01:42

## 2017-10-12 RX ADMIN — LISINOPRIL 20 MG: 20 TABLET ORAL at 08:40

## 2017-10-12 RX ADMIN — ATORVASTATIN CALCIUM 40 MG: 40 TABLET, FILM COATED ORAL at 08:40

## 2017-10-12 RX ADMIN — ALBUTEROL SULFATE 2.5 MG: 2.5 SOLUTION RESPIRATORY (INHALATION) at 10:04

## 2017-10-12 RX ADMIN — INSULIN ASPART 2 UNITS: 100 INJECTION, SOLUTION INTRAVENOUS; SUBCUTANEOUS at 12:16

## 2017-10-12 RX ADMIN — CARVEDILOL 12.5 MG: 12.5 TABLET, FILM COATED ORAL at 17:23

## 2017-10-12 RX ADMIN — FUROSEMIDE 20 MG: 10 INJECTION, SOLUTION INTRAMUSCULAR; INTRAVENOUS at 16:06

## 2017-10-12 RX ADMIN — INSULIN HUMAN 10 UNITS: 100 INJECTION, SUSPENSION SUBCUTANEOUS at 08:29

## 2017-10-12 RX ADMIN — PANTOPRAZOLE SODIUM 40 MG: 40 TABLET, DELAYED RELEASE ORAL at 08:40

## 2017-10-12 RX ADMIN — FUROSEMIDE 20 MG: 10 INJECTION, SOLUTION INTRAMUSCULAR; INTRAVENOUS at 21:57

## 2017-10-12 RX ADMIN — FOLIC ACID 1 MG: 1 TABLET ORAL at 08:40

## 2017-10-12 RX ADMIN — THERA TABS 1 TABLET: TAB at 08:40

## 2017-10-12 RX ADMIN — ENOXAPARIN SODIUM 40 MG: 40 INJECTION SUBCUTANEOUS at 08:40

## 2017-10-12 RX ADMIN — INSULIN ASPART 1 UNITS: 100 INJECTION, SOLUTION INTRAVENOUS; SUBCUTANEOUS at 17:46

## 2017-10-12 RX ADMIN — DOXYLAMINE SUCCINATE 50 MG: 25 TABLET ORAL at 21:55

## 2017-10-12 RX ADMIN — Medication 5 MG: at 21:55

## 2017-10-12 RX ADMIN — IPRATROPIUM BROMIDE AND ALBUTEROL SULFATE 3 ML: .5; 3 SOLUTION RESPIRATORY (INHALATION) at 15:25

## 2017-10-12 RX ADMIN — METHYLPREDNISOLONE SODIUM SUCCINATE 40 MG: 40 INJECTION, POWDER, FOR SOLUTION INTRAMUSCULAR; INTRAVENOUS at 09:21

## 2017-10-12 RX ADMIN — Medication 100 MG: at 08:40

## 2017-10-12 RX ADMIN — IPRATROPIUM BROMIDE AND ALBUTEROL SULFATE 3 ML: .5; 3 SOLUTION RESPIRATORY (INHALATION) at 11:36

## 2017-10-12 RX ADMIN — METHYLPREDNISOLONE SODIUM SUCCINATE 40 MG: 40 INJECTION, POWDER, FOR SOLUTION INTRAMUSCULAR; INTRAVENOUS at 03:36

## 2017-10-12 RX ADMIN — FUROSEMIDE 20 MG: 10 INJECTION, SOLUTION INTRAMUSCULAR; INTRAVENOUS at 08:40

## 2017-10-12 RX ADMIN — ASPIRIN 81 MG CHEWABLE TABLET 81 MG: 81 TABLET CHEWABLE at 08:41

## 2017-10-12 RX ADMIN — METHYLPREDNISOLONE SODIUM SUCCINATE 40 MG: 40 INJECTION, POWDER, FOR SOLUTION INTRAMUSCULAR; INTRAVENOUS at 17:23

## 2017-10-12 RX ADMIN — IPRATROPIUM BROMIDE AND ALBUTEROL SULFATE 3 ML: .5; 3 SOLUTION RESPIRATORY (INHALATION) at 08:06

## 2017-10-12 RX ADMIN — INFLUENZA A VIRUS A/MICHIGAN/45/2015 X-275 (H1N1) ANTIGEN (FORMALDEHYDE INACTIVATED), INFLUENZA A VIRUS A/HONG KONG/4801/2014 X-263B (H3N2) ANTIGEN (FORMALDEHYDE INACTIVATED), INFLUENZA B VIRUS B/PHUKET/3073/2013 ANTIGEN (FORMALDEHYDE INACTIVATED), AND INFLUENZA B VIRUS B/BRISBANE/60/2008 ANTIGEN (FORMALDEHYDE INACTIVATED) 0.5 ML: 15; 15; 15; 15 INJECTION, SUSPENSION INTRAMUSCULAR at 13:03

## 2017-10-12 RX ADMIN — PNEUMOCOCCAL VACCINE POLYVALENT 0.5 ML
25; 25; 25; 25; 25; 25; 25; 25; 25; 25; 25; 25; 25; 25; 25; 25; 25; 25; 25; 25; 25; 25; 25 INJECTION, SOLUTION INTRAMUSCULAR; SUBCUTANEOUS at 13:07

## 2017-10-12 RX ADMIN — IPRATROPIUM BROMIDE AND ALBUTEROL SULFATE 3 ML: .5; 3 SOLUTION RESPIRATORY (INHALATION) at 19:40

## 2017-10-12 RX ADMIN — POTASSIUM PHOSPHATE, MONOBASIC AND POTASSIUM PHOSPHATE, DIBASIC 15 MMOL: 224; 236 INJECTION, SOLUTION, CONCENTRATE INTRAVENOUS at 09:22

## 2017-10-12 NOTE — PROGRESS NOTES
[facility]  Hospitalist Progress Note  José Luis Whittington MD, MD 10/12/2017  (Text Page)  Reason for Stay (Diagnosis): SOB found with sepsis from pneumonia         Assessment and Plan:      Summary of Stay: Summary of Stay: Mr. Cm is a 57-year-old male with a history of coronary artery disease, previous ST elevation myocardial infarction one year ago, alcohol dependence, previous alcohol withdrawal, and hypertension, who presents to the hospital today for concerns about cough and fever with increasing shortness of breath.  He initially developed cough several weeks ago, but symptoms intensified for the past three days.  On presentation to the ER, he was noted to be tachycardic, febrile to 103 degrees, and labs notable for leukocytosis with white blood cell count of 21,000, lactic acid initially elevated, and chest x-ray shows an infiltrate.  Clinical findings consistent with sepsis secondary to pneumonia    Problem List:   1. Sepsis secondary to pneumonia suspect aspiration and bacterial  2. Acute on chronic systolic CHF in exacerbation  3. Acute respiratory failure secondary to #1 and #2  4. Hx of prior STEMI, appears to me that he was lost to f/u care. Likely with non compliance with medications as well  5. Continuous etoh abuse and dependence  6. Smoker    Continue inpatient ICU care. He is still requiring BIPAP treatment. On IV antibiotics.  Continue with current IV diuretics. Repeat echo showed LVEF of 30-35 and severely reduced R function.   On ACEi, may start BB and see monitor if tolerating.   Will request formal cardiology evaluation. Suspect cardiomyopathy from ischemia and etoh related.  Continue on ASA. Previously discharged on plavix after his prior STEMI (9/16) likely not compliant with it.  Care everywhere and chart review showed no outpatient f/u documentation. Recently admitted in a ETOH rehab.  Needs to stop smoking as well.  IV medrol to decrease at q8 h as this will also  "increase volume retention.   Avoid IV maintenance if possible.  Hyperglycemia likely from steroids on SSI    DVT Prophylaxis: Enoxaparin (Lovenox) SQ  Code Status: Full Code  Discharge Dispo: home  Estimated Disch Date / # of Days until Disch: suspect 2-3 more days        Interval History (Subjective):      Assumed care today. Seen and examined  Wife at bedside.  Alfa feels fine but still has O2 desaturation when off his BiPAP.  He tolerated oral diet and has no ongoing complaints of chest pain, SOB, nausea/vomiting.  Remained afebrile.                    Physical Exam:      Last Vital Signs:  BP (!) 139/101 (BP Location: Left arm)  Pulse 83  Temp 97.9  F (36.6  C) (Oral)  Resp (!) 34  Ht 1.702 m (5' 7\")  Wt 93 kg (205 lb 0.4 oz)  SpO2 91%  BMI 32.11 kg/m2    I/O last 3 completed shifts:  In: 1200 [P.O.:600; I.V.:600]  Out: 1550 [Urine:1550]  Wt Readings from Last 1 Encounters:   10/12/17 93 kg (205 lb 0.4 oz)     Vitals:    10/09/17 2211 10/10/17 0212 10/11/17 0616 10/12/17 0300   Weight: 90.7 kg (200 lb) 91.5 kg (201 lb 11.5 oz) 98.2 kg (216 lb 7.9 oz) 93 kg (205 lb 0.4 oz)       Constitutional: Awake, alert, cooperative, no apparent distress   Respiratory: Fair air entry, bilateral scattered wheezes, no crackles   Cardiovascular: Regular rate and rhythm, normal S1 and S2, and no murmur noted   Abdomen: Normal bowel sounds, soft, non-distended, non-tender   Skin: No rashes, no cyanosis, dry to touch   Neuro: Alert and oriented x3, no weakness, spontaneous and coherent speech   Extremities: Non pitting edema on both LE, normal range of motion   Other(s): Euthymic mood, not agitated       All other systems: Negative          Medications:      All current medications were reviewed with changes reflected in problem list.         Data:      All new lab and imaging data was reviewed.   Labs:    Recent Labs  Lab 10/10/17  1110 10/09/17  2230 10/09/17  2214   CULT Moderate growthNormal rebecca No growth after 2 days " No growth after 2 days       Recent Labs  Lab 10/12/17  0515 10/11/17  0510 10/10/17  1755    134 135   POTASSIUM 4.1 4.2 4.1   CHLORIDE 106 106 107   CO2 27 23 23   ANIONGAP 6 5 5   * 105* 154*   BUN 12 9 7   CR 0.77 0.70 0.70   GFRESTIMATED >90 >90 >90   GFRESTBLACK >90 >90 >90   GRANT 7.9* 7.4* 7.7*       Recent Labs  Lab 10/11/17  0510   NTBNPI 4368*       Recent Labs  Lab 10/12/17  0515 10/11/17  0510 10/10/17  0520   WBC 15.4* 21.6* 20.7*   HGB 11.4* 11.7* 11.8*   HCT 34.4* 35.7* 37.0*   MCV 94 96 96    139* 147*       Recent Labs  Lab 10/12/17  1151 10/12/17  0827 10/12/17  0515 10/12/17  0227 10/11/17  2202 10/11/17  1658  10/11/17  0510  10/10/17  1755  10/10/17  0520  10/09/17  2216   GLC  --   --  137*  --   --   --   --  105*  --  154*  --  149*  --  130*   * 137*  --  139* 182* 188*  < >  --   < >  --   < >  --   < >  --    < > = values in this interval not displayed.  No results for input(s): INR in the last 168 hours.    Recent Labs  Lab 10/09/17  2216   TROPI 0.025       Recent Labs  Lab 10/10/17  0001   COLOR Yellow   APPEARANCE Clear   URINEGLC Negative   URINEBILI Negative   URINEKETONE Negative   SG 1.012   UBLD Moderate*   URINEPH 6.0   PROTEIN 30*   NITRITE Negative   LEUKEST Negative   RBCU 5*   WBCU 2      Imaging:   Results for orders placed or performed during the hospital encounter of 10/09/17   Chest  XR, 1 view PORTABLE    Narrative    CHEST SINGLE VIEW PORTABLE  10/9/2017 11:13 PM     HISTORY: Sepsis.    COMPARISON: 9/30/2016.    FINDINGS: Hypoinflated lungs. An apparent hazy opacity in the right  lung base. The lungs are otherwise clear. Possible cardiomegaly.      Impression    IMPRESSION:  1. An apparent hazy opacity is present in the right lung base. This  could represent a portion of the right hemidiaphragm, but the  appearance is different than the comparison study dated 9/30/2016 and  therefore a pulmonary opacity such as pneumonia is also possible.  2.  No other findings suspicious for active cardiopulmonary disease.    TAYO JOHANSEN MD   XR Chest Port 1 View    Narrative    CHEST PORTABLE ONE VIEW 10/11/2017 9:47 AM     HISTORY: Hypoxia.    COMPARISON: 10/9/2017.      Impression    IMPRESSION: Interval development of diffuse bilateral pulmonary  infiltrates. Heart size appears enlarged but this may be related to  the AP portable technique alone.    LYNN MILLER MD

## 2017-10-12 NOTE — PLAN OF CARE
Problem: Pneumonia (Adult)  Goal: Signs and Symptoms of Listed Potential Problems Will be Absent, Minimized or Managed (Pneumonia)  Signs and symptoms of listed potential problems will be absent, minimized or managed by discharge/transition of care (reference Pneumonia (Adult) CPG).   Outcome: No Change  ICU End of Shift Summary.  For vital signs and complete assessments, please see documentation flowsheets.      Pertinent assessments: Slept between cares.Tolerated Bipap all night 50%O2 12/6  Major Shift Events:   Plan (Upcoming Events): Wean from BipAP  As tolerated    discharge/Transfer Needs: TBD     Bedside Shift Report Completed : y  Bedside Safety Check Completed:y

## 2017-10-12 NOTE — CONSULTS
CARDIOLOGY CONSULTATION      HISTORY OF PRESENT ILLNESS:  Alfa Cm is 57 years old.  He was admitted with weakness, shortness of breath, fever, cough and a chest x-ray supporting a right lower lung pneumonia.  He has a history of known coronary disease with a previous inferior wall infarct and I believe coronary stenting at that time.  He has been a long-term smoker and has a long-term history of struggling with excess alcohol.  He was admitted and his initial chest x-ray showed no evidence of heart failure.  As he was in the hospital he was felt to be somewhat volume depleted, fluids were pushed gently.  His serum albumin is low at 2.6 and today his chest x-ray in concert with him feeling a bit more short of breath shows diffuse pulmonary infiltrates suggesting left heart failure.  I do not think this is a total lung pneumonia although it is possible; he has improved with diuresis and with BiPAP.  He has received Lasix 20 mg IV q.6h. and has diuresed 700 cc and his breathing is better.      DATA:  On admission electrolytes were normal, creatinine 0.7, BUN 7, blood sugars were to 150 to 230, albumin 2.6, total protein 7.1, magnesium 1.3, hemoglobin 11.8 grams, white count 20,000.  With treatment his white count came down to 15,000 today.        EKG showed sinus tachycardia with Q-waves in II, III and aVF compatible with a transmural inferior MI and no significant ST-T changes acutely.      An echocardiogram was done.  This study showed a nondilated left ventricle, visually approximated EF of 30% to 35% with global hypokinesia and severe inferior lateral septal hypo-akinesia.  The hypo-akinesia has previously been noted from an echo of 04/2016 when his EF was called 45%, that decrease was primarily related to the inferior wall motion abnormality; currently LV systolic function is globally diminished, RV was moderately dilated with also severely depressed systolic function.      The history is a bit difficult  to take because he is on BiPAP and accordingly getting answers was difficult due to difficulty hearing his soft voice.      PAST MEDICAL HISTORY   1. ST elevation infarct 09/2016 with thrombectomy of the right coronary artery and previous right coronary artery stenting.   2.  Overweight.   3.  History of cerebrovascular accident.   4.  Active smoker and drinker.   5.  Hypertension.      MEDICATIONS PRIOR TO ADMISSION:  That he says he has been taking include;   1.  Lisinopril 20 mg a day.     2.  Protonix 40 mg a day.   3.  Albuterol inhaler p.r.n.   4.  Aspirin 81 mg a day.   5.  Vitamins daily.   6.  Oral iron daily.     7.  Pantoprazole.      FAMILY HISTORY:  Noncontributory.      SOCIAL HISTORY:  He is ; his wife is with him today, he says that he has not been drinking much although he was in rehab recently suggesting that he has had episodes of bingeing.   He drinks and smokes.  He is currently not working.        REVIEW OF SYSTEMS:  Negative for 10-point review otherwise except for the illness acutely associated with his pneumonia above.      ALLERGIES:  Pollen.      PHYSICAL EXAMINATION:   GENERAL:  Rotund overweight male on BiPAP.   VITAL SIGNS:  Heart rate is 75 beats a minute, blood pressure 130/80, O2 sats 97% with BiPAP in place.   HEAD:  Normal.   NECK:  He had no neck vein distention or bruits at 30 degrees.   HEART:  Tones are distant and regular without an S3 or murmur.   LUNGS:  Today are relatively clear.   ABDOMEN:  Rotund, firm, nontender without pain, mass, organomegaly.   EXTREMITIES:  Free of edema, pedal pulses +1, radial pulses +2.     NEURO:  His answers are appropriate.     SKIN:  He is warm, dry, pink and perfused otherwise.      This 57-year-old gentleman is admitted with pneumonia, sepsis, likely a pattern of poor nutrition has followed as his serum albumin is 2.6, his white count was 20,000 and he is being covered with broad-spectrum antibiotics for community-acquired pneumonia.       With that they felt he was somewhat volume depleted and they were right, his initial x-ray shows a very faint pneumonia of the right lung, with hydration however he third-spaced some of that fluid into his lungs -- left heart failure.      He has responded nicely to diuresis with Lasix and he says his breathing is significantly better.      His decreased EF may be complex and related to his sepsis, lactic acidosis and acute illness, there also may be an associated alcoholic component which will be difficult to measure upfront.  He has not had obvious angina, he does have a previous inferior wall infarct with an EF of 45% and now 30 to 35% with his acute illness, hopefully systolic function will recover, the right heart also has been profoundly affected, there was no evidence of pulmonary emboli on the CT that was done in the emergency room.      IMPRESSION:   1.  Multifactorial dyspnea.   2.  Pulmonary pneumonia as noted above.   3.  Subsequent left heart failure-pulmonary congestion edema due to LV dysfunction, fluid replacement and hypoalbuminemia.   4.  Chronic diabetes mellitus.   5.  History of hypertension.      PLAN:  I would use lisinopril and carvedilol; I have stopped the metoprolol and titrate them to tolerance, treat his pneumonia aggressively and improve his nutrition as much as possible.      Over an hour was spent doing this consult, ICU care, speaking with multiple physicians' family, nursing, et cetera and reviewing multiple records.         PIPPA ESTEBAN MD, [facility]             D: 10/12/2017 15:58   T: 10/12/2017 17:10   MT: [id number]      Name:     XUAN ARTEAGA   MRN:      [id number]        Account:       [id number]   :      1960           Consult Date:  10/12/2017      Document: [id number]       cc: Copy for Provider        [facility]

## 2017-10-12 NOTE — PLAN OF CARE
Problem: Pneumonia (Adult)  Goal: Signs and Symptoms of Listed Potential Problems Will be Absent, Minimized or Managed (Pneumonia)  Signs and symptoms of listed potential problems will be absent, minimized or managed by discharge/transition of care (reference Pneumonia (Adult) CPG).   Outcome: No Change  Patient alert & oriented, vss, however sats variable,  Decreases with any activity, and off bipap.  rr elevated into 30-40 even when on bipap, shear points applied, to protect forehead and nose, up to void and use commode.  Appetite good, treat accuchecks, replacing potassium phos.  Spouse at bedside,  Denies pain.

## 2017-10-12 NOTE — PROGRESS NOTES
RT-Pt remains on BIPAP 12/6, 50% this shift. Bs diminished. Douneb given in line with BIPAP. Spo2 94%. RT will continue to follow.

## 2017-10-12 NOTE — PROGRESS NOTES
RT note: pt remains on bipap with short breaks off t/o day. Pt tachypneic and very dyspneic with exertion. Sats 88-93% on bipap settings 12/6 60%. Continues to receive duoneb QID, with addition to one albuterol prn given this shift. Will continue to follow.    Heather Esteves, RRT

## 2017-10-12 NOTE — CONSULTS
Mr. Cm is a 57-year-old male with a history of coronary artery disease, previous ST elevation myocardial infarction one year ago, alcohol dependence, previous alcohol withdrawal, and hypertension, who presents to the hospital today for concerns about cough and fever with increasing shortness of breath.  He initially developed cough several weeks ago, but symptoms intensified for the past three days.  On presentation to the ER, he was noted to be tachycardic, febrile to 103 degrees, and labs notable for leukocytosis with white blood cell count of 21,000, lactic acid initially elevated, and chest x-ray shows an infiltrate.  Clinical findings consistent with sepsis secondary to pneumonia    His breathing worsened and his CXR showed diffuse pulmonary infiltrates  Suggestive of pulmonary edema.   Serum albumin is quite low at 2.6 grams => low oncotic pressure => 3rd spacing easier.    LVEF looked worse by echo at 30-35%.  More global hypokinesia on top of his prior IMI when his LVEF was called 45% in 9-2016.   His LVEF might improve with treatment of his sepsis/pneumonia      Switch metop => coreg  Titrate ACEI and BB aggessively  No ETOH  No smoking

## 2017-10-13 ENCOUNTER — APPOINTMENT (OUTPATIENT)
Dept: GENERAL RADIOLOGY | Facility: CLINIC | Age: 57
DRG: 871 | End: 2017-10-13
Attending: INTERNAL MEDICINE
Payer: COMMERCIAL

## 2017-10-13 LAB
ANION GAP SERPL CALCULATED.3IONS-SCNC: 7 MMOL/L (ref 3–14)
BASOPHILS # BLD AUTO: 0 10E9/L (ref 0–0.2)
BASOPHILS NFR BLD AUTO: 0.1 %
BUN SERPL-MCNC: 17 MG/DL (ref 7–30)
CALCIUM SERPL-MCNC: 8.4 MG/DL (ref 8.5–10.1)
CHLORIDE SERPL-SCNC: 103 MMOL/L (ref 94–109)
CO2 SERPL-SCNC: 27 MMOL/L (ref 20–32)
CREAT SERPL-MCNC: 0.79 MG/DL (ref 0.66–1.25)
DIFFERENTIAL METHOD BLD: ABNORMAL
EOSINOPHIL # BLD AUTO: 0 10E9/L (ref 0–0.7)
EOSINOPHIL NFR BLD AUTO: 0 %
ERYTHROCYTE [DISTWIDTH] IN BLOOD BY AUTOMATED COUNT: 14.9 % (ref 10–15)
GFR SERPL CREATININE-BSD FRML MDRD: >90 ML/MIN/1.7M2
GLUCOSE BLDC GLUCOMTR-MCNC: 128 MG/DL (ref 70–99)
GLUCOSE BLDC GLUCOMTR-MCNC: 129 MG/DL (ref 70–99)
GLUCOSE BLDC GLUCOMTR-MCNC: 137 MG/DL (ref 70–99)
GLUCOSE BLDC GLUCOMTR-MCNC: 156 MG/DL (ref 70–99)
GLUCOSE SERPL-MCNC: 130 MG/DL (ref 70–99)
HCT VFR BLD AUTO: 36.3 % (ref 40–53)
HGB BLD-MCNC: 11.8 G/DL (ref 13.3–17.7)
IMM GRANULOCYTES # BLD: 0.1 10E9/L (ref 0–0.4)
IMM GRANULOCYTES NFR BLD: 0.5 %
LYMPHOCYTES # BLD AUTO: 1.4 10E9/L (ref 0.8–5.3)
LYMPHOCYTES NFR BLD AUTO: 9 %
MCH RBC QN AUTO: 30.3 PG (ref 26.5–33)
MCHC RBC AUTO-ENTMCNC: 32.5 G/DL (ref 31.5–36.5)
MCV RBC AUTO: 93 FL (ref 78–100)
MONOCYTES # BLD AUTO: 0.7 10E9/L (ref 0–1.3)
MONOCYTES NFR BLD AUTO: 4.6 %
NEUTROPHILS # BLD AUTO: 12.9 10E9/L (ref 1.6–8.3)
NEUTROPHILS NFR BLD AUTO: 85.8 %
NRBC # BLD AUTO: 0 10*3/UL
NRBC BLD AUTO-RTO: 0 /100
PHOSPHATE SERPL-MCNC: 3 MG/DL (ref 2.5–4.5)
PLATELET # BLD AUTO: 187 10E9/L (ref 150–450)
POTASSIUM SERPL-SCNC: 3.8 MMOL/L (ref 3.4–5.3)
RBC # BLD AUTO: 3.9 10E12/L (ref 4.4–5.9)
SODIUM SERPL-SCNC: 137 MMOL/L (ref 133–144)
WBC # BLD AUTO: 15 10E9/L (ref 4–11)

## 2017-10-13 PROCEDURE — 25000132 ZZH RX MED GY IP 250 OP 250 PS 637: Performed by: HOSPITALIST

## 2017-10-13 PROCEDURE — 94640 AIRWAY INHALATION TREATMENT: CPT

## 2017-10-13 PROCEDURE — 25000128 H RX IP 250 OP 636: Performed by: INTERNAL MEDICINE

## 2017-10-13 PROCEDURE — 25000125 ZZHC RX 250: Performed by: INTERNAL MEDICINE

## 2017-10-13 PROCEDURE — 25000132 ZZH RX MED GY IP 250 OP 250 PS 637: Performed by: INTERNAL MEDICINE

## 2017-10-13 PROCEDURE — 85025 COMPLETE CBC W/AUTO DIFF WBC: CPT | Performed by: INTERNAL MEDICINE

## 2017-10-13 PROCEDURE — 20000003 ZZH R&B ICU

## 2017-10-13 PROCEDURE — 71010 XR CHEST PORT 1 VW: CPT

## 2017-10-13 PROCEDURE — 84100 ASSAY OF PHOSPHORUS: CPT | Performed by: INTERNAL MEDICINE

## 2017-10-13 PROCEDURE — 94660 CPAP INITIATION&MGMT: CPT

## 2017-10-13 PROCEDURE — 00000146 ZZHCL STATISTIC GLUCOSE BY METER IP

## 2017-10-13 PROCEDURE — 82565 ASSAY OF CREATININE: CPT | Performed by: INTERNAL MEDICINE

## 2017-10-13 PROCEDURE — 40000007 ZZH STATISTIC ADULT CD FACE TO FACE-NO CHRG

## 2017-10-13 PROCEDURE — 94640 AIRWAY INHALATION TREATMENT: CPT | Mod: 76

## 2017-10-13 PROCEDURE — 80048 BASIC METABOLIC PNL TOTAL CA: CPT | Performed by: INTERNAL MEDICINE

## 2017-10-13 PROCEDURE — 25000125 ZZHC RX 250: Performed by: HOSPITALIST

## 2017-10-13 PROCEDURE — 40000275 ZZH STATISTIC RCP TIME EA 10 MIN

## 2017-10-13 PROCEDURE — 99233 SBSQ HOSP IP/OBS HIGH 50: CPT | Performed by: INTERNAL MEDICINE

## 2017-10-13 PROCEDURE — 36415 COLL VENOUS BLD VENIPUNCTURE: CPT | Performed by: INTERNAL MEDICINE

## 2017-10-13 RX ORDER — FUROSEMIDE 10 MG/ML
40 INJECTION INTRAMUSCULAR; INTRAVENOUS
Status: DISCONTINUED | OUTPATIENT
Start: 2017-10-13 | End: 2017-10-14

## 2017-10-13 RX ORDER — METHYLPREDNISOLONE SODIUM SUCCINATE 40 MG/ML
40 INJECTION, POWDER, LYOPHILIZED, FOR SOLUTION INTRAMUSCULAR; INTRAVENOUS EVERY 12 HOURS
Status: COMPLETED | OUTPATIENT
Start: 2017-10-13 | End: 2017-10-14

## 2017-10-13 RX ADMIN — CARVEDILOL 12.5 MG: 12.5 TABLET, FILM COATED ORAL at 18:02

## 2017-10-13 RX ADMIN — THERA TABS 1 TABLET: TAB at 08:46

## 2017-10-13 RX ADMIN — IPRATROPIUM BROMIDE AND ALBUTEROL SULFATE 3 ML: .5; 3 SOLUTION RESPIRATORY (INHALATION) at 19:26

## 2017-10-13 RX ADMIN — LISINOPRIL 20 MG: 20 TABLET ORAL at 08:45

## 2017-10-13 RX ADMIN — FUROSEMIDE 40 MG: 10 INJECTION, SOLUTION INTRAVENOUS at 08:45

## 2017-10-13 RX ADMIN — Medication 5 MG: at 22:10

## 2017-10-13 RX ADMIN — IPRATROPIUM BROMIDE AND ALBUTEROL SULFATE 3 ML: .5; 3 SOLUTION RESPIRATORY (INHALATION) at 15:38

## 2017-10-13 RX ADMIN — PANTOPRAZOLE SODIUM 40 MG: 40 TABLET, DELAYED RELEASE ORAL at 08:46

## 2017-10-13 RX ADMIN — Medication 100 MG: at 08:46

## 2017-10-13 RX ADMIN — ALBUTEROL SULFATE 2.5 MG: 2.5 SOLUTION RESPIRATORY (INHALATION) at 09:38

## 2017-10-13 RX ADMIN — ATORVASTATIN CALCIUM 40 MG: 40 TABLET, FILM COATED ORAL at 08:45

## 2017-10-13 RX ADMIN — CEFTRIAXONE SODIUM 2 G: 2 INJECTION, POWDER, FOR SOLUTION INTRAMUSCULAR; INTRAVENOUS at 02:33

## 2017-10-13 RX ADMIN — ENOXAPARIN SODIUM 40 MG: 40 INJECTION SUBCUTANEOUS at 08:45

## 2017-10-13 RX ADMIN — CARVEDILOL 12.5 MG: 12.5 TABLET, FILM COATED ORAL at 08:45

## 2017-10-13 RX ADMIN — DOXYLAMINE SUCCINATE 50 MG: 25 TABLET ORAL at 22:10

## 2017-10-13 RX ADMIN — METHYLPREDNISOLONE SODIUM SUCCINATE 40 MG: 40 INJECTION, POWDER, FOR SOLUTION INTRAMUSCULAR; INTRAVENOUS at 02:30

## 2017-10-13 RX ADMIN — FUROSEMIDE 40 MG: 10 INJECTION, SOLUTION INTRAVENOUS at 16:19

## 2017-10-13 RX ADMIN — IPRATROPIUM BROMIDE AND ALBUTEROL SULFATE 3 ML: .5; 3 SOLUTION RESPIRATORY (INHALATION) at 07:16

## 2017-10-13 RX ADMIN — IPRATROPIUM BROMIDE AND ALBUTEROL SULFATE 3 ML: .5; 3 SOLUTION RESPIRATORY (INHALATION) at 11:55

## 2017-10-13 RX ADMIN — INSULIN HUMAN 10 UNITS: 100 INJECTION, SUSPENSION SUBCUTANEOUS at 08:22

## 2017-10-13 RX ADMIN — AZITHROMYCIN MONOHYDRATE 250 MG: 500 INJECTION, POWDER, LYOPHILIZED, FOR SOLUTION INTRAVENOUS at 02:34

## 2017-10-13 RX ADMIN — METHYLPREDNISOLONE SODIUM SUCCINATE 40 MG: 40 INJECTION, POWDER, FOR SOLUTION INTRAMUSCULAR; INTRAVENOUS at 14:56

## 2017-10-13 RX ADMIN — ASPIRIN 81 MG CHEWABLE TABLET 81 MG: 81 TABLET CHEWABLE at 08:45

## 2017-10-13 RX ADMIN — FOLIC ACID 1 MG: 1 TABLET ORAL at 08:46

## 2017-10-13 NOTE — CONSULTS
10/13/17 cd consult acknowledged.  Met with patient and introduced self and role.  He reported he plans to just quit cold turkey and not drink.  He was interested in community support such as AA and did accept a list of meetings in his community.  I also left him a business card to contact me in the future with any additional chem dep needs.

## 2017-10-13 NOTE — PROGRESS NOTES
IMPRESSION:   1.  Multifactorial dyspnea.   2.  Pulmonary pneumonia as noted above.   3.  Subsequent left heart failure-pulmonary congestion edema due to LV dysfunction, fluid replacement and hypoalbuminemia.   4.  Chronic diabetes mellitus.   5.  History of hypertension.     Current echo:  Left ventricular systolic function is moderately reduced.  The right ventricle is moderately dilated.  The right ventricular systolic function is severely reduced.  Right ventricular systolic pressure is elevated, consistent with mild to  moderate pulmonary hypertension.  The ascending aorta is Mildly dilated.  The visual ejection fraction is estimated at 30-35%    He was of bipap much of the day => back on it now due to dyespnea  No chest pain  LV and RV dysfunction I suspect is mainly ETOH    Check CXR tomorrow    [facility]  Cardiology Progress Note               Interval History:             Medications:   I have reviewed this patient's current medications    furosemide  40 mg Intravenous BID     methylPREDNISolone  40 mg Intravenous Q12H     carvedilol  12.5 mg Oral BID w/meals     multivitamin, therapeutic  1 tablet Oral Daily     ipratropium - albuterol 0.5 mg/2.5 mg/3 mL  3 mL Nebulization 4x daily     aspirin chewable tablet 81 mg  81 mg Oral Daily     pantoprazole (PROTONIX) EC tablet 40 mg  40 mg Oral QAM     cefTRIAXone  2 g Intravenous Q24H     azithromycin  250 mg Intravenous Q24H     enoxaparin  40 mg Subcutaneous Q24H     vitamin  B-1  100 mg Oral Daily     folic acid  1 mg Oral Daily     insulin aspart  1-10 Units Subcutaneous TID AC     insulin aspart  1-7 Units Subcutaneous At Bedtime     insulin isophane human  10 Units Subcutaneous QAM AC     atorvastatin (LIPITOR) tablet 40 mg  40 mg Oral Daily     lisinopril  20 mg Oral Daily     doxylamine  50 mg Oral At Bedtime     melatonin  5 mg Oral At Bedtime                 Physical Exam:   Blood pressure (!) 126/107, pulse 83,  "temperature 98.3  F (36.8  C), temperature source Axillary, resp. rate 24, height 1.702 m (5' 7\"), weight 91.3 kg (201 lb 4.5 oz), SpO2 100 %.  HEENT - Unremarkable  Neck - No NVD or Bruit  Heart -RSR soft heart tones, no S3  Lungs - clear to auscultation  Abdomen - soft w/o mass, pain or bruit  Extremeties -  No edema          Data:   Recent labs, imaging, and other studies were reviewed.      Recent Labs  Lab 10/13/17  0540      POTASSIUM 3.8   CHLORIDE 103   CO2 27   ANIONGAP 7   *   BUN 17   CR 0.79   GFRESTIMATED >90   GFRESTBLACK >90   GRANT 8.4*       Recent Labs  Lab 10/13/17  0540 10/12/17  0515 10/11/17  0510    139 134   POTASSIUM 3.8 4.1 4.2   CHLORIDE 103 106 106   CO2 27 27 23   ANIONGAP 7 6 5   * 137* 105*   BUN 17 12 9   CR 0.79 0.77 0.70   GFRESTIMATED >90 >90 >90   GFRESTBLACK >90 >90 >90   GRANT 8.4* 7.9* 7.4*       Recent Labs  Lab 10/13/17  0540 10/12/17  0515 10/11/17  0510   WBC 15.0* 15.4* 21.6*   HGB 11.8* 11.4* 11.7*   HCT 36.3* 34.4* 35.7*   MCV 93 94 96    163 139*       Recent Labs  Lab 10/13/17  0540 10/12/17  0515 10/11/17  0510  10/10/17  1755 10/10/17  0520  10/09/17  2216    139 134  --  135 137  --  132*   POTASSIUM 3.8 4.1 4.2  --  4.1 3.6  --  3.2*   CHLORIDE 103 106 106  --  107 108  --  98   CO2 27 27 23  --  23 22  --  22   ANIONGAP 7 6 5  --  5 7  --  12   * 137* 105*  --  154* 149*  --  130*   BUN 17 12 9  --  7 7  --  7   CR 0.79 0.77 0.70  --  0.70 0.78  --  0.84   GFRESTIMATED >90 >90 >90  --  >90 >90  --  >90   GFRESTBLACK >90 >90 >90  --  >90 >90  --  >90   GRANT 8.4* 7.9* 7.4*  --  7.7* 7.3*  --  8.7   MAG  --  2.6*  --   --  2.8* 1.3*  --   --    PHOS 3.0 2.4* 2.0*  < > 1.0* 2.4*  < >  --    PROTTOTAL  --   --   --   --   --  7.1  --  8.0   ALBUMIN  --   --   --   --   --  2.6*  --  3.0*   BILITOTAL  --   --   --   --   --  1.8*  --  1.4*   ALKPHOS  --   --   --   --   --  93  --  121   AST  --   --   --   --   --  42  --  18 "   ALT  --   --   --   --   --  18  --  13   < > = values in this interval not displayed.    Recent Labs  Lab 10/13/17  0540 10/12/17  0515 10/11/17  0510   BUN 17 12 9   CR 0.79 0.77 0.70     Recent labs and studies reviewed.    ECHO:   Results for orders placed or performed during the hospital encounter of 10/09/17   Chest  XR, 1 view PORTABLE    Narrative    CHEST SINGLE VIEW PORTABLE  10/9/2017 11:13 PM     HISTORY: Sepsis.    COMPARISON: 9/30/2016.    FINDINGS: Hypoinflated lungs. An apparent hazy opacity in the right  lung base. The lungs are otherwise clear. Possible cardiomegaly.      Impression    IMPRESSION:  1. An apparent hazy opacity is present in the right lung base. This  could represent a portion of the right hemidiaphragm, but the  appearance is different than the comparison study dated 9/30/2016 and  therefore a pulmonary opacity such as pneumonia is also possible.  2. No other findings suspicious for active cardiopulmonary disease.    TAYO JOHANSEN MD   XR Chest Port 1 View    Narrative    CHEST PORTABLE ONE VIEW 10/11/2017 9:47 AM     HISTORY: Hypoxia.    COMPARISON: 10/9/2017.      Impression    IMPRESSION: Interval development of diffuse bilateral pulmonary  infiltrates. Heart size appears enlarged but this may be related to  the AP portable technique alone.    LYNN MILLER MD            Assessment and Plan:   I will do the following work-up and studies.    I recommend continuing current treatment plans in the chart.

## 2017-10-13 NOTE — PROGRESS NOTES
ICU End of Shift Summary.  For vital signs and complete assessments, please see documentation flowsheets.     Pertinent assessments:  Patient  On BI-PAP as needed 60% 12/6 able to go on 5LNC for breaks and meals.  VSS-  Major Shift Events:   Plan (Upcoming Events): continue to wean off BI-PAP as able.  Discharge/Transfer Needs:  TBD     Bedside Shift Report Completed :   Bedside Safety Check Completed:

## 2017-10-13 NOTE — PROGRESS NOTES
[facility]  Hospitalist Progress Note  José Luis Whittington MD, MD 10/13/2017  (Text Page)  Reason for Stay (Diagnosis): SOB found with sepsis from pneumonia         Assessment and Plan:      Summary of Stay: Summary of Stay: Mr. Cm is a 57-year-old male with a history of coronary artery disease, previous ST elevation myocardial infarction one year ago, alcohol dependence, previous alcohol withdrawal, and hypertension, who presents to the hospital today for concerns about cough and fever with increasing shortness of breath.  He initially developed cough several weeks ago, but symptoms intensified for the past three days.  On presentation to the ER, he was noted to be tachycardic, febrile to 103 degrees, and labs notable for leukocytosis with white blood cell count of 21,000, lactic acid initially elevated, and chest x-ray shows an infiltrate.  Clinical findings consistent with sepsis secondary to pneumonia    Problem List:   1. Sepsis secondary to pneumonia suspect aspiration and bacterial  2. Acute on chronic systolic CHF in exacerbation  3. Severe RV dysfunction  4. Acute respiratory failure secondary to #1 and #2  5. Hx of prior STEMI, appears to me that he was lost to f/u care.  6. Continuous etoh abuse and dependence  7. Smoker    Continue inpatient ICU care. He is still requiring BIPAP treatment. On IV antibiotics. Cultures remained no growth, afebrile  Continue with current IV diuretics. Repeat echo showed LVEF of 30-35 and severely reduced R function.   On ACEi. Appreciate cardiology input and started on coreg   Suspect cardiomyopathy from ischemia/priof infarct and etoh related.  Continue on ASA. Previously discharged on plavix after his prior STEMI (9/16) unsure how compliant with it.  Creatinine stable, will increase lasix to 40 mg q12.  Care everywhere and chart review showed no outpatient f/u documentation. Recently admitted in a ETOH rehab.  Needs to stop smoking as well.  IV  "medrol to decrease at q12 h as this will also predispose increase volume retention.   Avoid IV maintenance if possible.  Hyperglycemia likely from steroids on SSI    DVT Prophylaxis: Enoxaparin (Lovenox) SQ  Code Status: Full Code  Discharge Dispo: home  Estimated Disch Date / # of Days until Disch: suspect 2-3 more days        Interval History (Subjective):      Assumed care today. Seen and examined  Wife at bedside.  Alfa is feeling better and has no ongoing complaints today.  Needing BiPAP last night                  Physical Exam:      Last Vital Signs:  /82  Pulse 83  Temp 97.4  F (36.3  C) (Axillary)  Resp 23  Ht 1.702 m (5' 7\")  Wt 91.3 kg (201 lb 4.5 oz)  SpO2 93%  BMI 31.52 kg/m2    I/O last 3 completed shifts:  In: 1026.5 [P.O.:360; I.V.:666.5]  Out: 1800 [Urine:1800]  Wt Readings from Last 1 Encounters:   10/13/17 91.3 kg (201 lb 4.5 oz)     Vitals:    10/09/17 2211 10/10/17 0212 10/11/17 0616 10/12/17 0300   Weight: 90.7 kg (200 lb) 91.5 kg (201 lb 11.5 oz) 98.2 kg (216 lb 7.9 oz) 93 kg (205 lb 0.4 oz)    10/13/17 0500   Weight: 91.3 kg (201 lb 4.5 oz)       Constitutional: Awake, alert, cooperative, no apparent distress   Respiratory: Fair air entry, minimal scattered wheezes, no crackles   Cardiovascular: Regular rate and rhythm, normal S1 and S2, and no murmur noted   Abdomen: Normal bowel sounds, soft, non-distended, non-tender   Skin: No rashes, no cyanosis, dry to touch   Neuro: Alert and oriented x3, no weakness, spontaneous and coherent speech   Extremities: Non pitting edema on both LE, normal range of motion   Other(s): Euthymic mood, not agitated       All other systems: Negative          Medications:      All current medications were reviewed with changes reflected in problem list.         Data:      All new lab and imaging data was reviewed.   Labs:    Recent Labs  Lab 10/10/17  1110 10/09/17  2230 10/09/17  2214   CULT Moderate growthNormal rebecca No growth after 3 days No " growth after 3 days       Recent Labs  Lab 10/13/17  0540 10/12/17  0515 10/11/17  0510    139 134   POTASSIUM 3.8 4.1 4.2   CHLORIDE 103 106 106   CO2 27 27 23   ANIONGAP 7 6 5   * 137* 105*   BUN 17 12 9   CR 0.79 0.77 0.70   GFRESTIMATED >90 >90 >90   GFRESTBLACK >90 >90 >90   GRANT 8.4* 7.9* 7.4*       Recent Labs  Lab 10/11/17  0510   NTBNPI 4368*       Recent Labs  Lab 10/13/17  0540 10/12/17  0515 10/11/17  0510   WBC 15.0* 15.4* 21.6*   HGB 11.8* 11.4* 11.7*   HCT 36.3* 34.4* 35.7*   MCV 93 94 96    163 139*       Recent Labs  Lab 10/13/17  0540 10/12/17  2132 10/12/17  1745 10/12/17  1151 10/12/17  0827 10/12/17  0515 10/12/17  0227  10/11/17  0510  10/10/17  1755  10/10/17  0520   *  --   --   --   --  137*  --   --  105*  --  154*  --  149*   BGM  --  185* 148* 166* 137*  --  139*  < >  --   < >  --   < >  --    < > = values in this interval not displayed.  No results for input(s): INR in the last 168 hours.    Recent Labs  Lab 10/12/17  2235 10/12/17  1834 10/12/17  1430   TROPI <0.015 <0.015 <0.015       Recent Labs  Lab 10/10/17  0001   COLOR Yellow   APPEARANCE Clear   URINEGLC Negative   URINEBILI Negative   URINEKETONE Negative   SG 1.012   UBLD Moderate*   URINEPH 6.0   PROTEIN 30*   NITRITE Negative   LEUKEST Negative   RBCU 5*   WBCU 2      Imaging:   Results for orders placed or performed during the hospital encounter of 10/09/17   Chest  XR, 1 view PORTABLE    Narrative    CHEST SINGLE VIEW PORTABLE  10/9/2017 11:13 PM     HISTORY: Sepsis.    COMPARISON: 9/30/2016.    FINDINGS: Hypoinflated lungs. An apparent hazy opacity in the right  lung base. The lungs are otherwise clear. Possible cardiomegaly.      Impression    IMPRESSION:  1. An apparent hazy opacity is present in the right lung base. This  could represent a portion of the right hemidiaphragm, but the  appearance is different than the comparison study dated 9/30/2016 and  therefore a pulmonary opacity such as  pneumonia is also possible.  2. No other findings suspicious for active cardiopulmonary disease.    TAYO JOHANSEN MD   XR Chest Port 1 View    Narrative    CHEST PORTABLE ONE VIEW 10/11/2017 9:47 AM     HISTORY: Hypoxia.    COMPARISON: 10/9/2017.      Impression    IMPRESSION: Interval development of diffuse bilateral pulmonary  infiltrates. Heart size appears enlarged but this may be related to  the AP portable technique alone.    LYNN MILLER MD

## 2017-10-13 NOTE — PROGRESS NOTES
RT- Patient has been on and off BIPAP today as tolerated. When off BIPAP patient has been on 5L nasal cannula. BS diminished and coarse at times. Productive cough as well, says sputum is thick and white. Will continue scheduled nebs.

## 2017-10-13 NOTE — PLAN OF CARE
Problem: Pneumonia (Adult)  Goal: Signs and Symptoms of Listed Potential Problems Will be Absent, Minimized or Managed (Pneumonia)  Signs and symptoms of listed potential problems will be absent, minimized or managed by discharge/transition of care (reference Pneumonia (Adult) CPG).   Outcome: No Change  ICU End of Shift Summary.  For vital signs and complete assessments, please see documentation flowsheets.      Pertinent assessments: VSS. Rested well on Bipap all night,60% 02 12/6. Continues to drop sats into 70s whenever mask is removed.   Major Shift Events:   Plan (Upcoming Events): Wean from Bipap as tolerated   Discharge/Transfer Needs: TBD     Bedside Shift Report Completed : y  Bedside Safety Check Completed:y

## 2017-10-13 NOTE — PROGRESS NOTES
RT-Received pt on BIPAP 12/6, 60%. Pt felt air hungry, increased pressures to 16/8. Pt now resting comfortably. Bs diminished. Spo2 95%. RT will continue to follow.

## 2017-10-14 ENCOUNTER — APPOINTMENT (OUTPATIENT)
Dept: GENERAL RADIOLOGY | Facility: CLINIC | Age: 57
DRG: 871 | End: 2017-10-14
Attending: INTERNAL MEDICINE
Payer: COMMERCIAL

## 2017-10-14 LAB
ANION GAP SERPL CALCULATED.3IONS-SCNC: 5 MMOL/L (ref 3–14)
BUN SERPL-MCNC: 22 MG/DL (ref 7–30)
CALCIUM SERPL-MCNC: 8.3 MG/DL (ref 8.5–10.1)
CHLORIDE SERPL-SCNC: 102 MMOL/L (ref 94–109)
CO2 SERPL-SCNC: 29 MMOL/L (ref 20–32)
CREAT SERPL-MCNC: 0.81 MG/DL (ref 0.66–1.25)
GFR SERPL CREATININE-BSD FRML MDRD: >90 ML/MIN/1.7M2
GLUCOSE BLDC GLUCOMTR-MCNC: 142 MG/DL (ref 70–99)
GLUCOSE BLDC GLUCOMTR-MCNC: 148 MG/DL (ref 70–99)
GLUCOSE BLDC GLUCOMTR-MCNC: 169 MG/DL (ref 70–99)
GLUCOSE BLDC GLUCOMTR-MCNC: 199 MG/DL (ref 70–99)
GLUCOSE BLDC GLUCOMTR-MCNC: 248 MG/DL (ref 70–99)
GLUCOSE SERPL-MCNC: 133 MG/DL (ref 70–99)
POTASSIUM SERPL-SCNC: 4.1 MMOL/L (ref 3.4–5.3)
SODIUM SERPL-SCNC: 136 MMOL/L (ref 133–144)

## 2017-10-14 PROCEDURE — 80048 BASIC METABOLIC PNL TOTAL CA: CPT | Performed by: INTERNAL MEDICINE

## 2017-10-14 PROCEDURE — 25000132 ZZH RX MED GY IP 250 OP 250 PS 637: Performed by: INTERNAL MEDICINE

## 2017-10-14 PROCEDURE — 94660 CPAP INITIATION&MGMT: CPT

## 2017-10-14 PROCEDURE — 71010 XR CHEST 1 VW: CPT

## 2017-10-14 PROCEDURE — 25000125 ZZHC RX 250: Performed by: HOSPITALIST

## 2017-10-14 PROCEDURE — 20000003 ZZH R&B ICU

## 2017-10-14 PROCEDURE — 36415 COLL VENOUS BLD VENIPUNCTURE: CPT | Performed by: INTERNAL MEDICINE

## 2017-10-14 PROCEDURE — 94640 AIRWAY INHALATION TREATMENT: CPT | Mod: 76

## 2017-10-14 PROCEDURE — 94640 AIRWAY INHALATION TREATMENT: CPT

## 2017-10-14 PROCEDURE — 25000128 H RX IP 250 OP 636: Performed by: INTERNAL MEDICINE

## 2017-10-14 PROCEDURE — 25000132 ZZH RX MED GY IP 250 OP 250 PS 637: Performed by: HOSPITALIST

## 2017-10-14 PROCEDURE — 40000275 ZZH STATISTIC RCP TIME EA 10 MIN

## 2017-10-14 PROCEDURE — 99232 SBSQ HOSP IP/OBS MODERATE 35: CPT | Performed by: INTERNAL MEDICINE

## 2017-10-14 PROCEDURE — 00000146 ZZHCL STATISTIC GLUCOSE BY METER IP

## 2017-10-14 PROCEDURE — 99233 SBSQ HOSP IP/OBS HIGH 50: CPT | Performed by: INTERNAL MEDICINE

## 2017-10-14 RX ORDER — FUROSEMIDE 10 MG/ML
40 INJECTION INTRAMUSCULAR; INTRAVENOUS 3 TIMES DAILY
Status: DISCONTINUED | OUTPATIENT
Start: 2017-10-14 | End: 2017-10-15

## 2017-10-14 RX ORDER — PREDNISONE 20 MG/1
40 TABLET ORAL DAILY
Status: DISCONTINUED | OUTPATIENT
Start: 2017-10-15 | End: 2017-10-16

## 2017-10-14 RX ADMIN — IPRATROPIUM BROMIDE AND ALBUTEROL SULFATE 3 ML: .5; 3 SOLUTION RESPIRATORY (INHALATION) at 19:42

## 2017-10-14 RX ADMIN — AZITHROMYCIN MONOHYDRATE 250 MG: 500 INJECTION, POWDER, LYOPHILIZED, FOR SOLUTION INTRAVENOUS at 03:16

## 2017-10-14 RX ADMIN — METHYLPREDNISOLONE SODIUM SUCCINATE 40 MG: 40 INJECTION, POWDER, FOR SOLUTION INTRAMUSCULAR; INTRAVENOUS at 02:06

## 2017-10-14 RX ADMIN — FUROSEMIDE 40 MG: 10 INJECTION, SOLUTION INTRAVENOUS at 21:10

## 2017-10-14 RX ADMIN — IPRATROPIUM BROMIDE AND ALBUTEROL SULFATE 3 ML: .5; 3 SOLUTION RESPIRATORY (INHALATION) at 07:35

## 2017-10-14 RX ADMIN — CARVEDILOL 12.5 MG: 12.5 TABLET, FILM COATED ORAL at 17:06

## 2017-10-14 RX ADMIN — INSULIN HUMAN 10 UNITS: 100 INJECTION, SUSPENSION SUBCUTANEOUS at 08:27

## 2017-10-14 RX ADMIN — LISINOPRIL 20 MG: 20 TABLET ORAL at 08:09

## 2017-10-14 RX ADMIN — DOXYLAMINE SUCCINATE 50 MG: 25 TABLET ORAL at 22:02

## 2017-10-14 RX ADMIN — INSULIN ASPART 1 UNITS: 100 INJECTION, SOLUTION INTRAVENOUS; SUBCUTANEOUS at 12:35

## 2017-10-14 RX ADMIN — INSULIN ASPART 1 UNITS: 100 INJECTION, SOLUTION INTRAVENOUS; SUBCUTANEOUS at 08:28

## 2017-10-14 RX ADMIN — CARVEDILOL 12.5 MG: 12.5 TABLET, FILM COATED ORAL at 08:09

## 2017-10-14 RX ADMIN — THERA TABS 1 TABLET: TAB at 08:09

## 2017-10-14 RX ADMIN — CEFTRIAXONE SODIUM 2 G: 2 INJECTION, POWDER, FOR SOLUTION INTRAMUSCULAR; INTRAVENOUS at 02:07

## 2017-10-14 RX ADMIN — FUROSEMIDE 40 MG: 10 INJECTION, SOLUTION INTRAVENOUS at 08:09

## 2017-10-14 RX ADMIN — PANTOPRAZOLE SODIUM 40 MG: 40 TABLET, DELAYED RELEASE ORAL at 08:09

## 2017-10-14 RX ADMIN — ENOXAPARIN SODIUM 40 MG: 40 INJECTION SUBCUTANEOUS at 08:09

## 2017-10-14 RX ADMIN — INSULIN ASPART 2 UNITS: 100 INJECTION, SOLUTION INTRAVENOUS; SUBCUTANEOUS at 17:32

## 2017-10-14 RX ADMIN — Medication 5 MG: at 22:02

## 2017-10-14 RX ADMIN — Medication 100 MG: at 08:09

## 2017-10-14 RX ADMIN — METHYLPREDNISOLONE SODIUM SUCCINATE 40 MG: 40 INJECTION, POWDER, FOR SOLUTION INTRAMUSCULAR; INTRAVENOUS at 15:00

## 2017-10-14 RX ADMIN — FOLIC ACID 1 MG: 1 TABLET ORAL at 08:19

## 2017-10-14 RX ADMIN — ASPIRIN 81 MG CHEWABLE TABLET 81 MG: 81 TABLET CHEWABLE at 08:09

## 2017-10-14 RX ADMIN — ATORVASTATIN CALCIUM 40 MG: 40 TABLET, FILM COATED ORAL at 08:09

## 2017-10-14 RX ADMIN — IPRATROPIUM BROMIDE AND ALBUTEROL SULFATE 3 ML: .5; 3 SOLUTION RESPIRATORY (INHALATION) at 15:24

## 2017-10-14 RX ADMIN — FUROSEMIDE 40 MG: 10 INJECTION, SOLUTION INTRAVENOUS at 17:06

## 2017-10-14 RX ADMIN — IPRATROPIUM BROMIDE AND ALBUTEROL SULFATE 3 ML: .5; 3 SOLUTION RESPIRATORY (INHALATION) at 12:10

## 2017-10-14 NOTE — PROGRESS NOTES
[facility]  Hospitalist Progress Note  José Luis Whittington MD, MD 10/14/2017  (Text Page)  Reason for Stay (Diagnosis): SOB found with sepsis from pneumonia         Assessment and Plan:      Summary of Stay: Summary of Stay: Mr. Cm is a 57-year-old male with a history of coronary artery disease, previous ST elevation myocardial infarction one year ago, alcohol dependence, previous alcohol withdrawal, and hypertension, who presents to the hospital today for concerns about cough and fever with increasing shortness of breath.  He initially developed cough several weeks ago, but symptoms intensified for the past three days.  On presentation to the ER, he was noted to be tachycardic, febrile to 103 degrees, and labs notable for leukocytosis with white blood cell count of 21,000, lactic acid initially elevated, and chest x-ray shows an infiltrate.  Clinical findings consistent with sepsis secondary to pneumonia    Problem List:   1. Sepsis secondary to pneumonia suspect aspiration and bacterial  2. Acute on chronic systolic CHF in exacerbation  3. Severe RV dysfunction  4. Acute respiratory failure secondary to #1 and #2  5. Hx of prior STEMI, appears to me that he was lost to f/u care.  6. Continuous etoh abuse and dependence  7. Smoker    Continue inpatient ICU care. He is still requiring BIPAP treatment. On IV antibiotics. Cultures remained no growth, afebrile  - repeat CXR showing persistent pulmonary edema but clinically he has some improvement. Not much decrease on his weight.  Will increase lasix today at 40 mg TID and continue to monitor. Creatinine is stable.  Repeat echo showed LVEF of 30-35 and severely reduced R function.   On ACEi. Appreciate cardiology input and started on coreg   Suspect cardiomyopathy from prior infarct and etoh related.  Continue on ASA. Previously discharged on plavix after his prior STEMI (9/16) unsure how compliant with it.    Care everywhere and chart review  "showed no outpatient f/u documentation. Recently admitted in a ETOH rehab.  Needs to stop smoking as well.  IV medrol to decrease at q12 h as this will also predispose increase volume retention, last dose of IV medrol today and change to oral prednisone in AM  Avoid IV maintenance if possible.  Hyperglycemia likely from steroids on SSI    DVT Prophylaxis: Enoxaparin (Lovenox) SQ  Code Status: Full Code  Discharge Dispo: home  Estimated Disch Date / # of Days until Disch: suspect 2-3 more days, will need 1 more day of ICU care and may transfer to tele- floor in 1 day if not utilizing much of BiPAP        Interval History (Subjective):      Continuing  care today. Seen and examined  Wife at bedside.  Alfa slept ok, as usual he is stating no ongoing complaints. He thinks that he is improving. Good appetite and tolerating oral diet well.  Afebrile         Physical Exam:      Last Vital Signs:  BP (!) 147/123  Pulse 83  Temp 97.7  F (36.5  C) (Oral)  Resp 12  Ht 1.702 m (5' 7\")  Wt 91.3 kg (201 lb 4.5 oz)  SpO2 92%  BMI 31.52 kg/m2    I/O last 3 completed shifts:  In: 1250 [P.O.:900; I.V.:350]  Out: 2375 [Urine:2375]  Wt Readings from Last 1 Encounters:   10/13/17 91.3 kg (201 lb 4.5 oz)     Vitals:    10/09/17 2211 10/10/17 0212 10/11/17 0616 10/12/17 0300   Weight: 90.7 kg (200 lb) 91.5 kg (201 lb 11.5 oz) 98.2 kg (216 lb 7.9 oz) 93 kg (205 lb 0.4 oz)    10/13/17 0500   Weight: 91.3 kg (201 lb 4.5 oz)       Constitutional: Awake, alert, cooperative, no apparent distress   Respiratory: Fair air entry, minimal scattered wheezes, minimal basal crackles   Cardiovascular: Regular rate and rhythm, normal S1 and S2, and no murmur noted   Abdomen: Normal bowel sounds, soft, non-distended, non-tender   Skin: No rashes, no cyanosis, dry to touch   Neuro: Alert and oriented x3, no weakness, spontaneous and coherent speech   Extremities: Resolving non pitting edema on both LE, normal range of motion   Other(s): Euthymic " mood, not agitated       All other systems: Negative          Medications:      All current medications were reviewed with changes reflected in problem list.         Data:      All new lab and imaging data was reviewed.   Labs:    Recent Labs  Lab 10/10/17  1110 10/09/17  2230 10/09/17  2214   CULT Moderate growthNormal rebecca No growth after 4 days No growth after 4 days       Recent Labs  Lab 10/14/17  0520 10/13/17  0540 10/12/17  0515    137 139   POTASSIUM 4.1 3.8 4.1   CHLORIDE 102 103 106   CO2 29 27 27   ANIONGAP 5 7 6   * 130* 137*   BUN 22 17 12   CR 0.81 0.79 0.77   GFRESTIMATED >90 >90 >90   GFRESTBLACK >90 >90 >90   GRANT 8.3* 8.4* 7.9*       Recent Labs  Lab 10/11/17  0510   NTBNPI 4368*       Recent Labs  Lab 10/13/17  0540 10/12/17  0515 10/11/17  0510   WBC 15.0* 15.4* 21.6*   HGB 11.8* 11.4* 11.7*   HCT 36.3* 34.4* 35.7*   MCV 93 94 96    163 139*       Recent Labs  Lab 10/14/17  0826 10/14/17  0520 10/14/17  0252 10/13/17  2209 10/13/17  1724 10/13/17  1234  10/13/17  0540  10/12/17  0515  10/11/17  0510  10/10/17  1755   GLC  --  133*  --   --   --   --   --  130*  --  137*  --  105*  --  154*   *  --  199* 156* 137* 128*  < >  --   < >  --   < >  --   < >  --    < > = values in this interval not displayed.  No results for input(s): INR in the last 168 hours.    Recent Labs  Lab 10/12/17  2235 10/12/17  1834 10/12/17  1430   TROPI <0.015 <0.015 <0.015       Recent Labs  Lab 10/10/17  0001   COLOR Yellow   APPEARANCE Clear   URINEGLC Negative   URINEBILI Negative   URINEKETONE Negative   SG 1.012   UBLD Moderate*   URINEPH 6.0   PROTEIN 30*   NITRITE Negative   LEUKEST Negative   RBCU 5*   WBCU 2      Imaging:   Results for orders placed or performed during the hospital encounter of 10/09/17   Chest  XR, 1 view PORTABLE    Narrative    CHEST SINGLE VIEW PORTABLE  10/9/2017 11:13 PM     HISTORY: Sepsis.    COMPARISON: 9/30/2016.    FINDINGS: Hypoinflated lungs. An apparent  hazy opacity in the right  lung base. The lungs are otherwise clear. Possible cardiomegaly.      Impression    IMPRESSION:  1. An apparent hazy opacity is present in the right lung base. This  could represent a portion of the right hemidiaphragm, but the  appearance is different than the comparison study dated 9/30/2016 and  therefore a pulmonary opacity such as pneumonia is also possible.  2. No other findings suspicious for active cardiopulmonary disease.    ATYO JOHANSEN MD   XR Chest Port 1 View    Narrative    CHEST PORTABLE ONE VIEW 10/11/2017 9:47 AM     HISTORY: Hypoxia.    COMPARISON: 10/9/2017.      Impression    IMPRESSION: Interval development of diffuse bilateral pulmonary  infiltrates. Heart size appears enlarged but this may be related to  the AP portable technique alone.    LYNN MILLER MD

## 2017-10-14 NOTE — PROGRESS NOTES
Patient continues on BIPAP 16/8 55% throughout night. Takes short breaks off BIPAP and goes on 5lpm NC when off. BS coarse and diminished. Continues to receive duoneb QID. Patient states breathing is getting better. RT will continue to follow.

## 2017-10-14 NOTE — PROGRESS NOTES
Mr. Cm is a 57-year-old male with a history of coronary artery disease, previous ST elevation myocardial infarction one year ago, alcohol dependence, previous alcohol withdrawal, and hypertension, who presents to the hospital today for concerns about cough and fever with increasing shortness of breath.  He initially developed cough several weeks ago, but symptoms intensified for the past three days.  On presentation to the ER, he was noted to be tachycardic, febrile to 103 degrees, and labs notable for leukocytosis with white blood cell count of 21,000, lactic acid initially elevated, and chest x-ray shows an infiltrate.  Clinical findings consistent with sepsis secondary to pneumonia.    His LVEF by echo ~30-35%.  Some of his pulmonary infiltrates are due to some L Heart failure and the low albumin promoting any 3rd spacing of fluids..   His CXR from today is clearer than previously and fits the fact he says his dyspnea better.   He's on  and off Bipap for dyspnea and decreased O2 sats.      Problem List:   1. Sepsis secondary to pneumonia suspect aspiration and bacterial  2. Acute on chronic systolic CHF in exacerbation  3. Severe RV dysfunction  4. Acute respiratory failure secondary to #1 and #2  5. Hx of prior STEMI, appears to me that he was lost to f/u care.  6. Continuous etoh abuse and dependence  7. Smoker        NECK:  He had no neck vein distention or bruits at 30 degrees.   HEART:  Tones are distant and regular without an S3 or murmur.   LUNGS:  Today are relatively clear.   ABDOMEN:  Rotund, firm, nontender without pain, mass, organomegaly.   EXTREMITIES:  Free of edema, pedal pulses +1, radial pulses +2.     NEURO:  His answers are appropriate.     SKIN:  He is warm, dry and perfused otherwise.    Gradually better - continue as we are with diuresis slowly  Treat for both LV edema and pneumonia

## 2017-10-14 NOTE — PROGRESS NOTES
Patient has been on and off BIPAP throughout the day, on a 5L nasal cannula when off BIPAP. BS diminished with crackles. Continuing scheduled duonebs.

## 2017-10-15 LAB
ANION GAP SERPL CALCULATED.3IONS-SCNC: 4 MMOL/L (ref 3–14)
BUN SERPL-MCNC: 24 MG/DL (ref 7–30)
CALCIUM SERPL-MCNC: 8.5 MG/DL (ref 8.5–10.1)
CHLORIDE SERPL-SCNC: 101 MMOL/L (ref 94–109)
CO2 SERPL-SCNC: 33 MMOL/L (ref 20–32)
CREAT SERPL-MCNC: 0.84 MG/DL (ref 0.66–1.25)
GFR SERPL CREATININE-BSD FRML MDRD: >90 ML/MIN/1.7M2
GLUCOSE BLDC GLUCOMTR-MCNC: 111 MG/DL (ref 70–99)
GLUCOSE BLDC GLUCOMTR-MCNC: 126 MG/DL (ref 70–99)
GLUCOSE BLDC GLUCOMTR-MCNC: 172 MG/DL (ref 70–99)
GLUCOSE BLDC GLUCOMTR-MCNC: 192 MG/DL (ref 70–99)
GLUCOSE BLDC GLUCOMTR-MCNC: 92 MG/DL (ref 70–99)
GLUCOSE SERPL-MCNC: 113 MG/DL (ref 70–99)
POTASSIUM SERPL-SCNC: 3.7 MMOL/L (ref 3.4–5.3)
SODIUM SERPL-SCNC: 138 MMOL/L (ref 133–144)

## 2017-10-15 PROCEDURE — 94640 AIRWAY INHALATION TREATMENT: CPT | Mod: 76

## 2017-10-15 PROCEDURE — 25000128 H RX IP 250 OP 636: Performed by: INTERNAL MEDICINE

## 2017-10-15 PROCEDURE — 25000125 ZZHC RX 250: Performed by: HOSPITALIST

## 2017-10-15 PROCEDURE — 40000275 ZZH STATISTIC RCP TIME EA 10 MIN

## 2017-10-15 PROCEDURE — 25000125 ZZHC RX 250: Performed by: INTERNAL MEDICINE

## 2017-10-15 PROCEDURE — 94660 CPAP INITIATION&MGMT: CPT

## 2017-10-15 PROCEDURE — 00000146 ZZHCL STATISTIC GLUCOSE BY METER IP

## 2017-10-15 PROCEDURE — 25000132 ZZH RX MED GY IP 250 OP 250 PS 637: Performed by: HOSPITALIST

## 2017-10-15 PROCEDURE — 94640 AIRWAY INHALATION TREATMENT: CPT

## 2017-10-15 PROCEDURE — 12000011 ZZH R&B MS OVERFLOW

## 2017-10-15 PROCEDURE — 99232 SBSQ HOSP IP/OBS MODERATE 35: CPT | Performed by: INTERNAL MEDICINE

## 2017-10-15 PROCEDURE — 25000132 ZZH RX MED GY IP 250 OP 250 PS 637: Performed by: INTERNAL MEDICINE

## 2017-10-15 PROCEDURE — 36415 COLL VENOUS BLD VENIPUNCTURE: CPT | Performed by: INTERNAL MEDICINE

## 2017-10-15 PROCEDURE — 99233 SBSQ HOSP IP/OBS HIGH 50: CPT | Performed by: INTERNAL MEDICINE

## 2017-10-15 PROCEDURE — 80048 BASIC METABOLIC PNL TOTAL CA: CPT | Performed by: INTERNAL MEDICINE

## 2017-10-15 RX ORDER — FUROSEMIDE 40 MG
40 TABLET ORAL 2 TIMES DAILY
Status: DISCONTINUED | OUTPATIENT
Start: 2017-10-15 | End: 2017-10-18 | Stop reason: HOSPADM

## 2017-10-15 RX ORDER — CARVEDILOL 6.25 MG/1
6.25 TABLET ORAL 2 TIMES DAILY WITH MEALS
Status: DISCONTINUED | OUTPATIENT
Start: 2017-10-15 | End: 2017-10-18 | Stop reason: HOSPADM

## 2017-10-15 RX ADMIN — Medication 100 MG: at 08:16

## 2017-10-15 RX ADMIN — PANTOPRAZOLE SODIUM 40 MG: 40 TABLET, DELAYED RELEASE ORAL at 08:16

## 2017-10-15 RX ADMIN — POTASSIUM CHLORIDE 20 MEQ: 1.5 POWDER, FOR SOLUTION ORAL at 08:17

## 2017-10-15 RX ADMIN — LISINOPRIL 20 MG: 20 TABLET ORAL at 08:16

## 2017-10-15 RX ADMIN — FUROSEMIDE 40 MG: 40 TABLET ORAL at 16:27

## 2017-10-15 RX ADMIN — CARVEDILOL 6.25 MG: 6.25 TABLET, FILM COATED ORAL at 19:44

## 2017-10-15 RX ADMIN — FUROSEMIDE 40 MG: 40 TABLET ORAL at 21:21

## 2017-10-15 RX ADMIN — PREDNISONE 40 MG: 20 TABLET ORAL at 08:16

## 2017-10-15 RX ADMIN — CEFTRIAXONE SODIUM 2 G: 2 INJECTION, POWDER, FOR SOLUTION INTRAMUSCULAR; INTRAVENOUS at 02:08

## 2017-10-15 RX ADMIN — THERA TABS 1 TABLET: TAB at 08:16

## 2017-10-15 RX ADMIN — INSULIN HUMAN 10 UNITS: 100 INJECTION, SUSPENSION SUBCUTANEOUS at 07:52

## 2017-10-15 RX ADMIN — FOLIC ACID 1 MG: 1 TABLET ORAL at 08:16

## 2017-10-15 RX ADMIN — ASPIRIN 81 MG CHEWABLE TABLET 81 MG: 81 TABLET CHEWABLE at 08:17

## 2017-10-15 RX ADMIN — IPRATROPIUM BROMIDE AND ALBUTEROL SULFATE 3 ML: .5; 3 SOLUTION RESPIRATORY (INHALATION) at 07:28

## 2017-10-15 RX ADMIN — FUROSEMIDE 40 MG: 10 INJECTION, SOLUTION INTRAVENOUS at 08:17

## 2017-10-15 RX ADMIN — ATORVASTATIN CALCIUM 40 MG: 40 TABLET, FILM COATED ORAL at 08:16

## 2017-10-15 RX ADMIN — IPRATROPIUM BROMIDE AND ALBUTEROL SULFATE 3 ML: .5; 3 SOLUTION RESPIRATORY (INHALATION) at 17:23

## 2017-10-15 RX ADMIN — CARVEDILOL 6.25 MG: 6.25 TABLET, FILM COATED ORAL at 08:16

## 2017-10-15 RX ADMIN — DOXYLAMINE SUCCINATE 50 MG: 25 TABLET ORAL at 21:59

## 2017-10-15 RX ADMIN — IPRATROPIUM BROMIDE AND ALBUTEROL SULFATE 3 ML: .5; 3 SOLUTION RESPIRATORY (INHALATION) at 11:42

## 2017-10-15 RX ADMIN — ENOXAPARIN SODIUM 40 MG: 40 INJECTION SUBCUTANEOUS at 08:17

## 2017-10-15 RX ADMIN — IPRATROPIUM BROMIDE AND ALBUTEROL SULFATE 3 ML: .5; 3 SOLUTION RESPIRATORY (INHALATION) at 19:47

## 2017-10-15 RX ADMIN — Medication 5 MG: at 21:59

## 2017-10-15 NOTE — PROGRESS NOTES
Patient continues on BIPAP 16/8 45% throughout night. When off wears 5lpm NC. BS coarse and diminished. Continues to receive duoneb QID. RT will continue to follow.

## 2017-10-15 NOTE — PROVIDER NOTIFICATION
This note also relates to the following rows which could not be included:  Heart Rate - Cannot attach notes to unvalidated device data  Resp - Cannot attach notes to unvalidated device data  SpO2 - Cannot attach notes to unvalidated device data    Off BI-PAP

## 2017-10-15 NOTE — PLAN OF CARE
Problem: Patient Care Overview  Goal: Plan of Care/Patient Progress Review  Outcome: No Change  ICU End of Shift Summary.  For vital signs and complete assessments, please see documentation flowsheets.      Pertinent assessments: AOx3. CIWA 0. Denies pain. Afebrile. Sinus bradycardia HR 40s-60s. Asymptomatic. Lungs diminished. On BIPAP throughout noc. Productive cough. Voiding in urinal at bedside.   Major Shift Events: none  Plan (Upcoming Events): TBD   Discharge/Transfer Needs: TBD     Bedside Shift Report Completed : Y  Bedside Safety Check Completed: Y

## 2017-10-15 NOTE — PROGRESS NOTES
[facility]  Hospitalist Progress Note  José Luis Whittington MD, MD 10/15/2017  (Text Page)  Reason for Stay (Diagnosis): SOB found with sepsis from pneumonia         Assessment and Plan:      Summary of Stay: Summary of Stay: Mr. Cm is a 57-year-old male with a history of coronary artery disease, previous ST elevation myocardial infarction one year ago, alcohol dependence, previous alcohol withdrawal, and hypertension, who presents to the hospital today for concerns about cough and fever with increasing shortness of breath.  He initially developed cough several weeks ago, but symptoms intensified for the past three days.  On presentation to the ER, he was noted to be tachycardic, febrile to 103 degrees, and labs notable for leukocytosis with white blood cell count of 21,000, lactic acid initially elevated, and chest x-ray shows an infiltrate.  Clinical findings consistent with sepsis secondary to pneumonia    Problem List:   1. Sepsis secondary to pneumonia suspect aspiration and bacterial in etiology  2. Acute on chronic systolic CHF in exacerbation  3. Severe RV dysfunction  4. Acute respiratory failure secondary to #1 and #2- improving  5. Suspect he also has a COPD component given long standing smoking history  6. Hx of prior STEMI, appears to me that he was lost to f/u care.  7. Continuous etoh abuse and dependence  8. Smoker  9. Hyperglycemia from steroids- no prior hx of DM, normal a1c, improving now since off IV steroids, stop NPH and change SSI to medium intensity    Continue inpatient care.  On IV antibiotics now on rocephin alone, previously on zithromax as well.   Cultures remained no growth, afebrile  - repeat CXR showing persistent pulmonary edema but clinically he has some improvement. He is negative balance and down 6 Kg.   Will change lasix to oral route  Creatinine is stable.  Repeat echo showed LVEF of 30-35 and severely reduced R function.   On ACEi. Appreciate cardiology  "input and started on coreg. Decrease coreg to 6.25 bid today given earlier bradycardia  Suspect cardiomyopathy from prior infarct and etoh related.  Continue on ASA. Previously discharged on plavix after his prior STEMI (9/16) unsure how compliant with it.    Care everywhere and chart review showed no outpatient f/u documentation. Recently admitted in a ETOH rehab.  Needs to stop smoking as well.  IV medrol to decrease at q12 h as this will also predispose increase volume retention, last dose of IV medrol 10/14  and change to oral prednisone today  Avoid IV maintenance if possible.  Hyperglycemia likely from steroids on SSI  - transfer to cardiac tele bed    DVT Prophylaxis: Enoxaparin (Lovenox) SQ  Code Status: Full Code  Discharge Dispo: home  Estimated Disch Date / # of Days until Disch: suspect 2-3 more days. Transfer out of ICU        Interval History (Subjective):      Continuing  care today. Seen and examined  Alfa is in good spirits, slept well last night. He is sitting on the chair and tolerating his oral diet.  He feels much better, off BIPAP this morning and denies any ongoing SOB, chest pain, nausea/vomiting.  Remained afebrile  Lost 6 Kg in the past 3 days         Physical Exam:      Last Vital Signs:  /89  Pulse 83  Temp 98  F (36.7  C) (Oral)  Resp 13  Ht 1.702 m (5' 7\")  Wt 87 kg (191 lb 12.8 oz)  SpO2 93%  BMI 30.04 kg/m2    I/O last 3 completed shifts:  In: 350 [P.O.:250; I.V.:100]  Out: 3000 [Urine:3000]  Wt Readings from Last 1 Encounters:   10/15/17 87 kg (191 lb 12.8 oz)     Vitals:    10/10/17 0212 10/11/17 0616 10/12/17 0300 10/13/17 0500   Weight: 91.5 kg (201 lb 11.5 oz) 98.2 kg (216 lb 7.9 oz) 93 kg (205 lb 0.4 oz) 91.3 kg (201 lb 4.5 oz)    10/15/17 0800   Weight: 87 kg (191 lb 12.8 oz)       Constitutional: Awake, alert, cooperative, no apparent distress   Respiratory: Fair air entry, minimal scattered wheezes, minimal basal crackles   Cardiovascular: Regular rate and " rhythm, normal S1 and S2, and no murmur noted   Abdomen: Normal bowel sounds, soft, non-distended, non-tender   Skin: No rashes, no cyanosis, dry to touch   Neuro: Alert and oriented x3, no weakness, spontaneous and coherent speech   Extremities: Resolving non pitting edema on both LE, normal range of motion   Other(s): Euthymic mood, not agitated       All other systems: Negative          Medications:      All current medications were reviewed with changes reflected in problem list.         Data:      All new lab and imaging data was reviewed.   Labs:    Recent Labs  Lab 10/10/17  1110 10/09/17  2230 10/09/17  2214   CULT Moderate growthNormal rebecca No growth after 5 days No growth after 5 days       Recent Labs  Lab 10/15/17  0525 10/14/17  0520 10/13/17  0540    136 137   POTASSIUM 3.7 4.1 3.8   CHLORIDE 101 102 103   CO2 33* 29 27   ANIONGAP 4 5 7   * 133* 130*   BUN 24 22 17   CR 0.84 0.81 0.79   GFRESTIMATED >90 >90 >90   GFRESTBLACK >90 >90 >90   GRANT 8.5 8.3* 8.4*       Recent Labs  Lab 10/11/17  0510   NTBNPI 4368*       Recent Labs  Lab 10/13/17  0540 10/12/17  0515 10/11/17  0510   WBC 15.0* 15.4* 21.6*   HGB 11.8* 11.4* 11.7*   HCT 36.3* 34.4* 35.7*   MCV 93 94 96    163 139*       Recent Labs  Lab 10/15/17  0751 10/15/17  0525 10/15/17  0307 10/14/17  2200 10/14/17  1732 10/14/17  1233  10/14/17  0520  10/13/17  0540  10/12/17  0515  10/11/17  0510   GLC  --  113*  --   --   --   --   --  133*  --  130*  --  137*  --  105*   BGM 92  --  126* 248* 169* 142*  < >  --   < >  --   < >  --   < >  --    < > = values in this interval not displayed.  No results for input(s): INR in the last 168 hours.    Recent Labs  Lab 10/12/17  2235 10/12/17  1834 10/12/17  1430   TROPI <0.015 <0.015 <0.015       Recent Labs  Lab 10/10/17  0001   COLOR Yellow   APPEARANCE Clear   URINEGLC Negative   URINEBILI Negative   URINEKETONE Negative   SG 1.012   UBLD Moderate*   URINEPH 6.0   PROTEIN 30*   NITRITE  Negative   LEUKEST Negative   RBCU 5*   WBCU 2      Imaging:   Results for orders placed or performed during the hospital encounter of 10/09/17   Chest  XR, 1 view PORTABLE    Narrative    CHEST SINGLE VIEW PORTABLE  10/9/2017 11:13 PM     HISTORY: Sepsis.    COMPARISON: 9/30/2016.    FINDINGS: Hypoinflated lungs. An apparent hazy opacity in the right  lung base. The lungs are otherwise clear. Possible cardiomegaly.      Impression    IMPRESSION:  1. An apparent hazy opacity is present in the right lung base. This  could represent a portion of the right hemidiaphragm, but the  appearance is different than the comparison study dated 9/30/2016 and  therefore a pulmonary opacity such as pneumonia is also possible.  2. No other findings suspicious for active cardiopulmonary disease.    TAYO JOHANSEN MD   XR Chest Port 1 View    Narrative    CHEST PORTABLE ONE VIEW 10/11/2017 9:47 AM     HISTORY: Hypoxia.    COMPARISON: 10/9/2017.      Impression    IMPRESSION: Interval development of diffuse bilateral pulmonary  infiltrates. Heart size appears enlarged but this may be related to  the AP portable technique alone.    LYNN MILLER MD

## 2017-10-15 NOTE — PROGRESS NOTES
Mr. Cm is a 57-year-old male with a history of coronary artery disease, previous ST elevation myocardial infarction one year ago, alcohol dependence, previous alcohol withdrawal, and hypertension, who presents to the hospital today for concerns about cough and fever with increasing shortness of breath.  He initially developed cough several weeks ago, but symptoms intensified for the past three days.  On presentation to the ER, he was noted to be tachycardic, febrile to 103 degrees, and labs notable for leukocytosis with white blood cell count of 21,000, lactic acid initially elevated, and chest x-ray shows an infiltrate.  Clinical findings consistent with sepsis secondary to pneumonia.    He feels much better today (10-), off BIPAP this morning and denies any ongoing SOB, chest pain, nausea/vomiting.  Remains afebrile      His LVEF by echo ~30-35%.  Some of his pulmonary infiltrates are due to some L Heart failure and the low albumin promoting any 3rd spacing of fluids..   His CXR from today is clearer than previously and fits the fact he says his dyspnea better.   He's on  and off Bipap for dyspnea and decreased O2 sats.    Results for XUAN CM (MRN [id number]) as of 10/15/2017 15:51   Ref. Range 10/14/2017 05:20   Sodium Latest Ref Range: 133 - 144 mmol/L 136   Potassium Latest Ref Range: 3.4 - 5.3 mmol/L 4.1   Chloride Latest Ref Range: 94 - 109 mmol/L 102   Carbon Dioxide Latest Ref Range: 20 - 32 mmol/L 29   Urea Nitrogen Latest Ref Range: 7 - 30 mg/dL 22   Creatinine Latest Ref Range: 0.66 - 1.25 mg/dL 0.81   GFR Estimate Latest Ref Range: >60 mL/min/1.7m2 >90   GFR Estimate If Black Latest Ref Range: >60 mL/min/1.7m2 >90   Calcium Latest Ref Range: 8.5 - 10.1 mg/dL 8.3 (L)   Anion Gap Latest Ref Range: 3 - 14 mmol/L 5           Problem List:   1. Sepsis secondary to pneumonia suspect aspiration and bacterial  2. Acute on chronic systolic CHF in exacerbation  3. Severe RV and LV  systolic  Dysfunction.   I'm hoping with resolved sepsis, avoiding ETOH, better nutrition, CORE clinic =>  He will recover with improved systolic function function  4. Acute respiratory failure secondary to #1 and #2  5. Hx of prior STEMI, appears to me that he was lost to f/u care.  6. Continuous etoh abuse and dependence  Recent ETOH Rehab  7. Smoker        NECK:  He had no neck vein distention or bruits at 30 degrees.   HEART:  Tones are distant and regular without an S3 or murmur.   LUNGS:  Today are relatively clear.   ABDOMEN:  Rotund, firm, nontender without pain, mass, organomegaly.   EXTREMITIES:  Free of edema, pedal pulses +1, radial pulses +2.     NEURO:  His answers are appropriate.     SKIN:  He is warm, dry and perfused otherwise.    Gradually better - continue as we are with diuresis slowly  Treat for both LV edema and pneumonia    I agree with  Changing IV lasix to oral Lasix  Creatinine is stable.  Repeat echo showed LVEF of 30-35 and severely reduced R function.   On ACEi. Appreciate cardiology input and started on coreg. Decrease coreg to 6.25 bid today given earlier bradycardia  Suspect cardiomyopathy from prior infarct and etoh related.

## 2017-10-16 LAB
BACTERIA SPEC CULT: NO GROWTH
BACTERIA SPEC CULT: NO GROWTH
CREAT SERPL-MCNC: 0.84 MG/DL (ref 0.66–1.25)
GFR SERPL CREATININE-BSD FRML MDRD: >90 ML/MIN/1.7M2
GLUCOSE BLDC GLUCOMTR-MCNC: 106 MG/DL (ref 70–99)
GLUCOSE BLDC GLUCOMTR-MCNC: 121 MG/DL (ref 70–99)
GLUCOSE BLDC GLUCOMTR-MCNC: 131 MG/DL (ref 70–99)
GLUCOSE BLDC GLUCOMTR-MCNC: 252 MG/DL (ref 70–99)
GLUCOSE BLDC GLUCOMTR-MCNC: 81 MG/DL (ref 70–99)
Lab: NORMAL
Lab: NORMAL
PLATELET # BLD AUTO: 323 10E9/L (ref 150–450)
POTASSIUM SERPL-SCNC: 3.6 MMOL/L (ref 3.4–5.3)
SPECIMEN SOURCE: NORMAL
SPECIMEN SOURCE: NORMAL

## 2017-10-16 PROCEDURE — 00000146 ZZHCL STATISTIC GLUCOSE BY METER IP

## 2017-10-16 PROCEDURE — 25000132 ZZH RX MED GY IP 250 OP 250 PS 637: Performed by: INTERNAL MEDICINE

## 2017-10-16 PROCEDURE — 94640 AIRWAY INHALATION TREATMENT: CPT | Mod: 76

## 2017-10-16 PROCEDURE — 82565 ASSAY OF CREATININE: CPT | Performed by: INTERNAL MEDICINE

## 2017-10-16 PROCEDURE — 25000132 ZZH RX MED GY IP 250 OP 250 PS 637: Performed by: HOSPITALIST

## 2017-10-16 PROCEDURE — 36415 COLL VENOUS BLD VENIPUNCTURE: CPT | Performed by: INTERNAL MEDICINE

## 2017-10-16 PROCEDURE — 25000128 H RX IP 250 OP 636: Performed by: INTERNAL MEDICINE

## 2017-10-16 PROCEDURE — 40000275 ZZH STATISTIC RCP TIME EA 10 MIN

## 2017-10-16 PROCEDURE — 12000011 ZZH R&B MS OVERFLOW

## 2017-10-16 PROCEDURE — 94640 AIRWAY INHALATION TREATMENT: CPT

## 2017-10-16 PROCEDURE — 94660 CPAP INITIATION&MGMT: CPT

## 2017-10-16 PROCEDURE — 99233 SBSQ HOSP IP/OBS HIGH 50: CPT | Performed by: INTERNAL MEDICINE

## 2017-10-16 PROCEDURE — 85049 AUTOMATED PLATELET COUNT: CPT | Performed by: INTERNAL MEDICINE

## 2017-10-16 PROCEDURE — 25000125 ZZHC RX 250: Performed by: HOSPITALIST

## 2017-10-16 PROCEDURE — 84132 ASSAY OF SERUM POTASSIUM: CPT | Performed by: INTERNAL MEDICINE

## 2017-10-16 PROCEDURE — 99232 SBSQ HOSP IP/OBS MODERATE 35: CPT | Performed by: INTERNAL MEDICINE

## 2017-10-16 PROCEDURE — 25000125 ZZHC RX 250: Performed by: INTERNAL MEDICINE

## 2017-10-16 RX ORDER — PREDNISONE 20 MG/1
20 TABLET ORAL DAILY
Status: DISCONTINUED | OUTPATIENT
Start: 2017-10-17 | End: 2017-10-18 | Stop reason: HOSPADM

## 2017-10-16 RX ADMIN — CARVEDILOL 6.25 MG: 6.25 TABLET, FILM COATED ORAL at 08:07

## 2017-10-16 RX ADMIN — IPRATROPIUM BROMIDE AND ALBUTEROL SULFATE 3 ML: .5; 3 SOLUTION RESPIRATORY (INHALATION) at 15:42

## 2017-10-16 RX ADMIN — LISINOPRIL 20 MG: 20 TABLET ORAL at 08:07

## 2017-10-16 RX ADMIN — ENOXAPARIN SODIUM 40 MG: 40 INJECTION SUBCUTANEOUS at 08:06

## 2017-10-16 RX ADMIN — PANTOPRAZOLE SODIUM 40 MG: 40 TABLET, DELAYED RELEASE ORAL at 08:07

## 2017-10-16 RX ADMIN — Medication 5 MG: at 22:29

## 2017-10-16 RX ADMIN — ATORVASTATIN CALCIUM 40 MG: 40 TABLET, FILM COATED ORAL at 08:07

## 2017-10-16 RX ADMIN — CARVEDILOL 6.25 MG: 6.25 TABLET, FILM COATED ORAL at 18:00

## 2017-10-16 RX ADMIN — FUROSEMIDE 40 MG: 40 TABLET ORAL at 20:33

## 2017-10-16 RX ADMIN — THERA TABS 1 TABLET: TAB at 08:07

## 2017-10-16 RX ADMIN — DOXYLAMINE SUCCINATE 50 MG: 25 TABLET ORAL at 22:28

## 2017-10-16 RX ADMIN — CEFTRIAXONE SODIUM 2 G: 2 INJECTION, POWDER, FOR SOLUTION INTRAMUSCULAR; INTRAVENOUS at 01:48

## 2017-10-16 RX ADMIN — IPRATROPIUM BROMIDE AND ALBUTEROL SULFATE 3 ML: .5; 3 SOLUTION RESPIRATORY (INHALATION) at 07:20

## 2017-10-16 RX ADMIN — IPRATROPIUM BROMIDE AND ALBUTEROL SULFATE 3 ML: .5; 3 SOLUTION RESPIRATORY (INHALATION) at 20:24

## 2017-10-16 RX ADMIN — FUROSEMIDE 40 MG: 40 TABLET ORAL at 08:07

## 2017-10-16 RX ADMIN — Medication 100 MG: at 08:07

## 2017-10-16 RX ADMIN — ASPIRIN 81 MG CHEWABLE TABLET 81 MG: 81 TABLET CHEWABLE at 08:07

## 2017-10-16 RX ADMIN — POTASSIUM CHLORIDE 20 MEQ: 1.5 POWDER, FOR SOLUTION ORAL at 08:06

## 2017-10-16 RX ADMIN — PREDNISONE 40 MG: 20 TABLET ORAL at 08:06

## 2017-10-16 RX ADMIN — FOLIC ACID 1 MG: 1 TABLET ORAL at 08:07

## 2017-10-16 RX ADMIN — IPRATROPIUM BROMIDE AND ALBUTEROL SULFATE 3 ML: .5; 3 SOLUTION RESPIRATORY (INHALATION) at 11:11

## 2017-10-16 NOTE — PROGRESS NOTES
"[facility] Progress Note     Alvarado Carson MD  10/16/2017         Interval History:      Admitted with pneumonia, sepsis, echo shows LVEF around 35%, previously 45%, also RV systolic function decreased, no chest pain, patient is feeling better than before, sitting up in bed without needing o2, weights down 10 lbs       Assessment and Plan:      57 year old male with delayed presentation of inferior STEMI 09/2016 sp RCA PCI with lvef 45%, other co morbidities of etoh abuse- 3-4 beer qd with 6 pack qd over weekend, tobacco abuse admitted with fever and cough , acute resp failure and was found to have pneumonia and sepsis, interval LVEF shows some decline in LVEF and also interval decline in RV systolic function- no chest pain, LVEF could be down due to stress vs etoh intake, clinically does not appear to be an acs, still scattered rhonchi on auscultation. Recommend continuing asa, BB, ace-I, statin, diuretics (appears euvolemic on exam), tobacco/etoh cessation, repeat echo in a few weeks time and if LVEF still decreased can consider a stress test like lexiscan at that time.    Plan extensively discussed with patient and family.  Cardiology will sign off. Please call with questions.               Physical Exam:      Heart Rate: 53, Blood pressure 137/78, pulse 83, temperature 98  F (36.7  C), temperature source Oral, resp. rate 14, height 1.702 m (5' 7\"), weight 87 kg (191 lb 12.8 oz), SpO2 91 %.  Vitals:    10/12/17 0300 10/13/17 0500 10/15/17 0800   Weight: 93 kg (205 lb 0.4 oz) 91.3 kg (201 lb 4.5 oz) 87 kg (191 lb 12.8 oz)     Vital Signs with Ranges  Temp:  [97.8  F (36.6  C)-98.1  F (36.7  C)] 98  F (36.7  C)  Heart Rate:  [44-72] 53  Resp:  [10-24] 14  BP: (131-139)/(66-87) 137/78  FiO2 (%):  [45 %] 45 %  SpO2:  [90 %-100 %] 91 %  I/O's Last 24 hours  I/O last 3 completed shifts:  In: 500 [P.O.:400; I.V.:100]  Out: 300 [Urine:300]  Gen- appears comfortable  Neck- normal JVP  Resp- scattered rhonchi  CVS- regular, " no mr/g  Ext- no pitting pedal edema  Neuro- alert, oriented.             Medications:          [START ON 10/17/2017] predniSONE  20 mg Oral Daily     carvedilol  6.25 mg Oral BID w/meals     furosemide  40 mg Oral BID     insulin aspart  1-7 Units Subcutaneous TID AC     insulin aspart  1-5 Units Subcutaneous At Bedtime     multivitamin, therapeutic  1 tablet Oral Daily     ipratropium - albuterol 0.5 mg/2.5 mg/3 mL  3 mL Nebulization 4x daily     aspirin chewable tablet 81 mg  81 mg Oral Daily     pantoprazole (PROTONIX) EC tablet 40 mg  40 mg Oral QAM     cefTRIAXone  2 g Intravenous Q24H     enoxaparin  40 mg Subcutaneous Q24H     vitamin  B-1  100 mg Oral Daily     folic acid  1 mg Oral Daily     atorvastatin (LIPITOR) tablet 40 mg  40 mg Oral Daily     lisinopril  20 mg Oral Daily     doxylamine  50 mg Oral At Bedtime     melatonin  5 mg Oral At Bedtime     PRN Meds: albuterol, naloxone, guaiFENesin, albuterol, ondansetron **OR** ondansetron, senna-docusate, polyethylene glycol, potassium chloride, potassium chloride, potassium chloride, potassium chloride with lidocaine, potassium chloride, magnesium sulfate, magnesium sulfate, potassium phosphate (KPHOS) in D5W IV, potassium phosphate (KPHOS) in D5W IV, potassium phosphate (KPHOS) in D5W IV, potassium phosphate (KPHOS) in D5W IV, glucose **OR** dextrose **OR** glucagon, LORazepam, hydrALAZINE, labetalol         Data:      All new lab and imaging data was reviewed.   Recent Labs   Lab Test  10/16/17   0602  10/13/17   0540  10/12/17   0515  10/11/17   0510   09/30/16   0955   WBC   --   15.0*  15.4*  21.6*   < >  17.8*   HGB   --   11.8*  11.4*  11.7*   < >  12.9*   MCV   --   93  94  96   < >  91   PLT  323  187  163  139*   < >  106*   INR   --    --    --    --    --   1.22*    < > = values in this interval not displayed.      Recent Labs   Lab Test  10/16/17   0602  10/15/17   0525  10/14/17   0520  10/13/17   0540   NA   --   138  136  137   POTASSIUM   3.6  3.7  4.1  3.8   CHLORIDE   --   101  102  103   CO2   --   33*  29  27   BUN   --   24  22  17   CR  0.84  0.84  0.81  0.79   ANIONGAP   --   4  5  7   GRANT   --   8.5  8.3*  8.4*   GLC   --   113*  133*  130*     Recent Labs   Lab Test  10/12/17   2235  10/12/17   1834  10/12/17   1430   09/30/16   0916   TROPI  <0.015  <0.015  <0.015   < >   --    TROPONIN   --    --    --    --   18.47*    < > = values in this interval not displayed.        Alvarado Carson MD  10/16/2017  Pager:  [phone]

## 2017-10-16 NOTE — PROGRESS NOTES
Patient remains on BIPAP throughout the night with settings: 16/8, RR 12, 45% FiO2 tolerated well, SpO2 mid 90's, BS diminished. Duoneb given as ordered. Will continue to monitor pt's respiratory status closely.    Nicole Mena, RT  10/16/2017 5:47 AM

## 2017-10-16 NOTE — PLAN OF CARE
Problem: Patient Care Overview  Goal: Plan of Care/Patient Progress Review  ICU End of Shift Summary.  For vital signs and complete assessments, please see documentation flowsheets.      Pertinent assessments: A&Ox4, up ad reginaldo, LS clear/dim, SB at times but asymptomatic, freq PVC, afebrile, denies pain, sats low to mid 90s on 6L NC, good appetite, voiding well  Major Shift Events: IS teaching, seen by cardiologist  Plan (Upcoming Events): continue IV abx then transition to oral, wean O2  Discharge/Transfer Needs: may need home O2 if unable to wean     Bedside Shift Report Completed : y  Bedside Safety Check Completed: y

## 2017-10-16 NOTE — PROGRESS NOTES
Patient has been off and on BIPAP today. When off of BIPAP has been on a 6L nasal cannula. BS diminished, crackles and wheezes at times. Continuing scheduled nebulizers.

## 2017-10-16 NOTE — PLAN OF CARE
Problem: Patient Care Overview  Goal: Plan of Care/Patient Progress Review  Outcome: Improving  ICU End of Shift Summary.  For vital signs and complete assessments, please see documentation flowsheets.      Pertinent assessments: Aox3. Denies pain. Afebrile. Sinus sandrita HR 40s-60s. Lungs diminsihed. BIPAP throughout noc. 6L NC when off BIPAP. Voiding in urinal/bathroom.   Major Shift Events: none  Plan (Upcoming Events): TOF  Discharge/Transfer Needs: TOF     Bedside Shift Report Completed : Y  Bedside Safety Check Completed: Y

## 2017-10-16 NOTE — PROGRESS NOTES
"Pt was on the BiPAP for part of the morning and is currently on a 6 LPM NC and is tolerating it well. BBS are coarse and diminished., he has a good strong NPC. Will continue to monitor and assess.    Vital signs:  Temp: 97.4  F (36.3  C) Temp src: Oral BP: 129/88   Heart Rate: 84 Resp: 18 SpO2: 91 % O2 Device: Nasal cannula Oxygen Delivery: 6 LPM Height: 170.2 cm (5' 7\") Weight: 87 kg (191 lb 12.8 oz)  Estimated body mass index is 30.04 kg/(m^2) as calculated from the following:    Height as of this encounter: 1.702 m (5' 7\").    Weight as of this encounter: 87 kg (191 lb 12.8 oz).      PAST MEDICAL HISTORY:   Past Medical History:   Diagnosis Date     Alcohol abuse      Hyperlipidemia      Hypertension      Myocardial infarction      Substance abuse      Tobacco abuse        PAST SURGICAL HISTORY:   Past Surgical History:   Procedure Laterality Date     KS PATIENT HAS A CORONARY ARTERY STENT         FAMILY HISTORY:   Family History   Problem Relation Age of Onset     DIABETES Mother        SOCIAL HISTORY:   Social History   Substance Use Topics     Smoking status: Current Every Day Smoker     Packs/day: 0.50     Smokeless tobacco: Not on file     Alcohol use 12.0 oz/week     20 Cans of beer per week     Darshan Kelly RT  10/16/2017    "

## 2017-10-17 ENCOUNTER — APPOINTMENT (OUTPATIENT)
Dept: GENERAL RADIOLOGY | Facility: CLINIC | Age: 57
DRG: 871 | End: 2017-10-17
Attending: INTERNAL MEDICINE
Payer: COMMERCIAL

## 2017-10-17 LAB
ANION GAP SERPL CALCULATED.3IONS-SCNC: 5 MMOL/L (ref 3–14)
BUN SERPL-MCNC: 20 MG/DL (ref 7–30)
CALCIUM SERPL-MCNC: 8.5 MG/DL (ref 8.5–10.1)
CHLORIDE SERPL-SCNC: 99 MMOL/L (ref 94–109)
CO2 SERPL-SCNC: 32 MMOL/L (ref 20–32)
CREAT SERPL-MCNC: 0.77 MG/DL (ref 0.66–1.25)
GFR SERPL CREATININE-BSD FRML MDRD: >90 ML/MIN/1.7M2
GLUCOSE BLDC GLUCOMTR-MCNC: 137 MG/DL (ref 70–99)
GLUCOSE BLDC GLUCOMTR-MCNC: 139 MG/DL (ref 70–99)
GLUCOSE BLDC GLUCOMTR-MCNC: 183 MG/DL (ref 70–99)
GLUCOSE BLDC GLUCOMTR-MCNC: 99 MG/DL (ref 70–99)
GLUCOSE SERPL-MCNC: 97 MG/DL (ref 70–99)
POTASSIUM SERPL-SCNC: 4 MMOL/L (ref 3.4–5.3)
SODIUM SERPL-SCNC: 136 MMOL/L (ref 133–144)

## 2017-10-17 PROCEDURE — 00000146 ZZHCL STATISTIC GLUCOSE BY METER IP

## 2017-10-17 PROCEDURE — 36415 COLL VENOUS BLD VENIPUNCTURE: CPT | Performed by: INTERNAL MEDICINE

## 2017-10-17 PROCEDURE — 40000274 ZZH STATISTIC RCP CONSULT EA 30 MIN

## 2017-10-17 PROCEDURE — 94640 AIRWAY INHALATION TREATMENT: CPT

## 2017-10-17 PROCEDURE — 12000007 ZZH R&B INTERMEDIATE

## 2017-10-17 PROCEDURE — 25000132 ZZH RX MED GY IP 250 OP 250 PS 637: Performed by: HOSPITALIST

## 2017-10-17 PROCEDURE — 25000132 ZZH RX MED GY IP 250 OP 250 PS 637: Performed by: INTERNAL MEDICINE

## 2017-10-17 PROCEDURE — 99233 SBSQ HOSP IP/OBS HIGH 50: CPT | Performed by: INTERNAL MEDICINE

## 2017-10-17 PROCEDURE — 94660 CPAP INITIATION&MGMT: CPT

## 2017-10-17 PROCEDURE — 25000125 ZZHC RX 250: Performed by: HOSPITALIST

## 2017-10-17 PROCEDURE — 71010 XR CHEST PORT 1 VW: CPT

## 2017-10-17 PROCEDURE — 94640 AIRWAY INHALATION TREATMENT: CPT | Mod: 76

## 2017-10-17 PROCEDURE — 25000125 ZZHC RX 250: Performed by: INTERNAL MEDICINE

## 2017-10-17 PROCEDURE — 40000275 ZZH STATISTIC RCP TIME EA 10 MIN

## 2017-10-17 PROCEDURE — 25000128 H RX IP 250 OP 636: Performed by: INTERNAL MEDICINE

## 2017-10-17 PROCEDURE — 80048 BASIC METABOLIC PNL TOTAL CA: CPT | Performed by: INTERNAL MEDICINE

## 2017-10-17 RX ADMIN — Medication 100 MG: at 08:17

## 2017-10-17 RX ADMIN — IPRATROPIUM BROMIDE AND ALBUTEROL SULFATE 3 ML: .5; 3 SOLUTION RESPIRATORY (INHALATION) at 15:18

## 2017-10-17 RX ADMIN — CARVEDILOL 6.25 MG: 6.25 TABLET, FILM COATED ORAL at 18:38

## 2017-10-17 RX ADMIN — THERA TABS 1 TABLET: TAB at 08:18

## 2017-10-17 RX ADMIN — FOLIC ACID 1 MG: 1 TABLET ORAL at 08:18

## 2017-10-17 RX ADMIN — CEFTRIAXONE SODIUM 2 G: 2 INJECTION, POWDER, FOR SOLUTION INTRAMUSCULAR; INTRAVENOUS at 02:18

## 2017-10-17 RX ADMIN — ENOXAPARIN SODIUM 40 MG: 40 INJECTION SUBCUTANEOUS at 08:17

## 2017-10-17 RX ADMIN — IPRATROPIUM BROMIDE AND ALBUTEROL SULFATE 3 ML: .5; 3 SOLUTION RESPIRATORY (INHALATION) at 07:13

## 2017-10-17 RX ADMIN — FUROSEMIDE 40 MG: 40 TABLET ORAL at 20:24

## 2017-10-17 RX ADMIN — LISINOPRIL 20 MG: 20 TABLET ORAL at 08:18

## 2017-10-17 RX ADMIN — IPRATROPIUM BROMIDE AND ALBUTEROL SULFATE 3 ML: .5; 3 SOLUTION RESPIRATORY (INHALATION) at 19:47

## 2017-10-17 RX ADMIN — FUROSEMIDE 40 MG: 40 TABLET ORAL at 08:18

## 2017-10-17 RX ADMIN — DOXYLAMINE SUCCINATE 50 MG: 25 TABLET ORAL at 21:37

## 2017-10-17 RX ADMIN — ATORVASTATIN CALCIUM 40 MG: 40 TABLET, FILM COATED ORAL at 08:18

## 2017-10-17 RX ADMIN — PANTOPRAZOLE SODIUM 40 MG: 40 TABLET, DELAYED RELEASE ORAL at 08:18

## 2017-10-17 RX ADMIN — IPRATROPIUM BROMIDE AND ALBUTEROL SULFATE 3 ML: .5; 3 SOLUTION RESPIRATORY (INHALATION) at 11:23

## 2017-10-17 RX ADMIN — CARVEDILOL 6.25 MG: 6.25 TABLET, FILM COATED ORAL at 08:17

## 2017-10-17 RX ADMIN — PREDNISONE 20 MG: 20 TABLET ORAL at 08:17

## 2017-10-17 RX ADMIN — Medication 5 MG: at 21:37

## 2017-10-17 RX ADMIN — ASPIRIN 81 MG CHEWABLE TABLET 81 MG: 81 TABLET CHEWABLE at 08:18

## 2017-10-17 ASSESSMENT — ACTIVITIES OF DAILY LIVING (ADL): ADLS_ACUITY_SCORE: 9

## 2017-10-17 NOTE — PLAN OF CARE
Problem: Patient Care Overview  Goal: Plan of Care/Patient Progress Review  Outcome: No Change  ICU End of Shift Summary.  For vital signs and complete assessments, please see documentation flowsheets.      Pertinent assessments: A&O. VSS. Afebrile.  Denies pain. LS diminished.  Tolerating Reg diet. Up with SBA.  Voiding adequate.   Major Shift Events: 6L NC while awake.  AM potassium level 4.0-no replacement needed, Asymptomatic SB tele.  Plan (Upcoming Events): Wean O2, monitor electrolytes, continue Telemetry cares.    Discharge/Transfer Needs: TOF, may need to DC home with oxygen.     Bedside Shift Report Completed : yes  Bedside Safety Check Completed: yes

## 2017-10-17 NOTE — PROGRESS NOTES
"Pt was on the BiPAP for part of the morning and is currently on RA and is tolerating it well. BBS are diminished., he has a good strong NPC. Will continue to monitor and assess.    Vital signs:  Temp: 97.9  F (36.6  C) Temp src: Oral BP: 142/87   Heart Rate: 79 Resp: 25 SpO2: 90 % O2 Device: None (Room air) Oxygen Delivery: 2 LPM Height: 170.2 cm (5' 7\") Weight: 87 kg (191 lb 12.8 oz)  Estimated body mass index is 30.04 kg/(m^2) as calculated from the following:    Height as of this encounter: 1.702 m (5' 7\").    Weight as of this encounter: 87 kg (191 lb 12.8 oz).    Darshan Kelly RT  10/17/2017       "

## 2017-10-17 NOTE — PROGRESS NOTES
[facility]  Hospitalist Progress Note  Boris Garcia MD 10/17/2017    Reason for Stay (Diagnosis): sepsis, PNA         Assessment and Plan:      Summary of Stay: Alfa Cm is a 57-year-old male with a history of coronary artery disease, previous ST elevation myocardial infarction one year ago, alcohol dependence, previous alcohol withdrawal, and hypertension, who presented to the hospital 10/10 for concerns about cough and fever with increasing shortness of breath.  He initially developed cough several weeks ago, but symptoms intensified for the past three days PTA.  On presentation to the ER, he was noted to be tachycardic, febrile to 103 degrees, and labs notable for leukocytosis with white blood cell count of 21,000, lactic acid initially elevated, and chest x-ray shows an infiltrate.  Clinical findings consistent with sepsis secondary to pneumonia.  He has been treated with Rocephin and azitho while hospitalized with clinical improvement in sx     Problem List:   1. Sepsis secondary to pneumonia, with question of possible aspiration vs severe CAP.  Remains on Rocephin (treat for approx 10 day course and stop - started 10/10); completed azithro  2. Acute on chronic systolic CHF exacerbation - improved with IV diuresis.  Appreciate cards input.  Has now been transitioned to PO lasix 40 mg bid (does not appear to be on Lasix at home based on med list).  Has known cardiomyopathy hx with EF 45% on TTE 9/17; measured at 30-35% this admit in setting of sepsis.  Cardiomyopahty likley from both ischemia (MI hx) and ETOH abuse.  Consider repeating in next 1 month if LVEF still decreased can consider a stress test like lexiscan at that time..    3. Severe RV dysfunction, had moderate dysfunction last year, not completely new  4. Acute respiratory failure secondary to #1 and #2- improving.  Continue to taper off prednisone - will change to 20 mg daily starting 10/17/17.  On intermittent  "BIPAP in ICU  5. Probable COPD given long standing smoking history  6. Hx of prior STEMI 9/16.  On lisinopril, coreg, asa, statin  7. Continuous etoh abuse and dependence, counseled on cessation  8. Smoker  counseled on cessation  9. Hyperglycemia from steroids- no prior hx of DM, normal a1c  10. Hypoxia:  Multifactorial including PNA and pulm edema (both being treated) and likely atelectasis.        Try not to use BiPAP at night  Wean off oxygen as possible, patient does not use home oxygen  Repeat chest x-ray    DVT Prophylaxis: Enoxaparin (Lovenox) SQ  Code Status: Full Code  Discharge Dispo: home  Estimated Disch Date / # of Days until Disch: If able to be off BiPAP at night and weaned off oxygen possibly can discharge tomorrow or day after depending how he does clinically          Interval History (Subjective):        ALTHOUGH REPORTS IMPROVEMENT, CONTINUES TO BE ON BIPAP AT NIGHT, and 6 L this morning when I saw him    Discussed with the nurse and intensivist                  Physical Exam:      Last Vital Signs:  /87 (BP Location: Left arm)  Pulse 83  Temp 97.9  F (36.6  C) (Oral)  Resp 13  Ht 1.702 m (5' 7\")  Wt 87 kg (191 lb 12.8 oz)  SpO2 92%  BMI 30.04 kg/m2      Intake/Output Summary (Last 24 hours) at 10/17/17 1215  Last data filed at 10/17/17 0200   Gross per 24 hour   Intake             1090 ml   Output              475 ml   Net              615 ml         Constitutional: Awake, alert, cooperative, no apparent distress.  Sitting in chair at bedside.  Sig other present   Respiratory: Clear to auscultation except rales basilar bilaterally, no crackles or wheezing, normal effort   Cardiovascular: Regular rate and rhythm, normal S1 and S2, and no murmur noted   Abdomen: Normal bowel sounds, soft, non-distended, non-tender   Skin: No rashes, no cyanosis, dry to touch   Neuro: Alert and oriented x3, no weakness, numbness, memory loss   Extremities: No edema, normal range of motion   Other(s):  "       All other systems: Negative          Medications:      All current medications were reviewed with changes reflected in problem list.         Data:      All new lab and imaging data was reviewed.   Labs:    Recent Labs  Lab 10/16/17  0602 10/13/17  0540   WBC  --  15.0*   HGB  --  11.8*   HCT  --  36.3*   MCV  --  93    187

## 2017-10-18 ENCOUNTER — DOCUMENTATION ONLY (OUTPATIENT)
Dept: MEDSURG UNIT | Facility: CLINIC | Age: 57
End: 2017-10-18

## 2017-10-18 VITALS
OXYGEN SATURATION: 88 % | DIASTOLIC BLOOD PRESSURE: 78 MMHG | TEMPERATURE: 98.4 F | HEART RATE: 83 BPM | SYSTOLIC BLOOD PRESSURE: 129 MMHG | RESPIRATION RATE: 20 BRPM | BODY MASS INDEX: 30.03 KG/M2 | HEIGHT: 67 IN | WEIGHT: 191.36 LBS

## 2017-10-18 LAB
ANION GAP SERPL CALCULATED.3IONS-SCNC: 5 MMOL/L (ref 3–14)
BUN SERPL-MCNC: 19 MG/DL (ref 7–30)
CALCIUM SERPL-MCNC: 8.5 MG/DL (ref 8.5–10.1)
CHLORIDE SERPL-SCNC: 100 MMOL/L (ref 94–109)
CO2 SERPL-SCNC: 31 MMOL/L (ref 20–32)
CREAT SERPL-MCNC: 0.86 MG/DL (ref 0.66–1.25)
GFR SERPL CREATININE-BSD FRML MDRD: >90 ML/MIN/1.7M2
GLUCOSE BLDC GLUCOMTR-MCNC: 100 MG/DL (ref 70–99)
GLUCOSE BLDC GLUCOMTR-MCNC: 105 MG/DL (ref 70–99)
GLUCOSE BLDC GLUCOMTR-MCNC: 136 MG/DL (ref 70–99)
GLUCOSE SERPL-MCNC: 86 MG/DL (ref 70–99)
HGB BLD-MCNC: 12.9 G/DL (ref 13.3–17.7)
MAGNESIUM SERPL-MCNC: 2 MG/DL (ref 1.6–2.3)
PHOSPHATE SERPL-MCNC: 3.6 MG/DL (ref 2.5–4.5)
POTASSIUM SERPL-SCNC: 3.6 MMOL/L (ref 3.4–5.3)
SODIUM SERPL-SCNC: 136 MMOL/L (ref 133–144)

## 2017-10-18 PROCEDURE — 25000132 ZZH RX MED GY IP 250 OP 250 PS 637: Performed by: INTERNAL MEDICINE

## 2017-10-18 PROCEDURE — 40000275 ZZH STATISTIC RCP TIME EA 10 MIN

## 2017-10-18 PROCEDURE — 25000125 ZZHC RX 250: Performed by: INTERNAL MEDICINE

## 2017-10-18 PROCEDURE — 25000125 ZZHC RX 250: Performed by: HOSPITALIST

## 2017-10-18 PROCEDURE — 99239 HOSP IP/OBS DSCHRG MGMT >30: CPT | Performed by: INTERNAL MEDICINE

## 2017-10-18 PROCEDURE — 84100 ASSAY OF PHOSPHORUS: CPT | Performed by: INTERNAL MEDICINE

## 2017-10-18 PROCEDURE — 85018 HEMOGLOBIN: CPT | Performed by: INTERNAL MEDICINE

## 2017-10-18 PROCEDURE — 00000146 ZZHCL STATISTIC GLUCOSE BY METER IP

## 2017-10-18 PROCEDURE — 83735 ASSAY OF MAGNESIUM: CPT | Performed by: INTERNAL MEDICINE

## 2017-10-18 PROCEDURE — 80048 BASIC METABOLIC PNL TOTAL CA: CPT | Performed by: INTERNAL MEDICINE

## 2017-10-18 PROCEDURE — 25000132 ZZH RX MED GY IP 250 OP 250 PS 637: Performed by: HOSPITALIST

## 2017-10-18 PROCEDURE — 36415 COLL VENOUS BLD VENIPUNCTURE: CPT | Performed by: INTERNAL MEDICINE

## 2017-10-18 PROCEDURE — 25000128 H RX IP 250 OP 636: Performed by: INTERNAL MEDICINE

## 2017-10-18 RX ORDER — PANTOPRAZOLE SODIUM 40 MG/1
40 TABLET, DELAYED RELEASE ORAL EVERY MORNING
Qty: 30 TABLET | Refills: 0 | Status: SHIPPED | OUTPATIENT
Start: 2017-10-18 | End: 2018-01-10

## 2017-10-18 RX ORDER — CARVEDILOL 6.25 MG/1
6.25 TABLET ORAL 2 TIMES DAILY WITH MEALS
Qty: 60 TABLET | Refills: 1 | Status: SHIPPED | OUTPATIENT
Start: 2017-10-18 | End: 2017-11-01

## 2017-10-18 RX ORDER — POLYETHYLENE GLYCOL 3350 17 G/17G
17 POWDER, FOR SOLUTION ORAL DAILY PRN
Qty: 7 PACKET | COMMUNITY
Start: 2017-10-18 | End: 2017-10-31

## 2017-10-18 RX ORDER — PREDNISONE 10 MG/1
10 TABLET ORAL DAILY
Qty: 4 TABLET | Refills: 0 | Status: SHIPPED | OUTPATIENT
Start: 2017-10-18 | End: 2017-10-22

## 2017-10-18 RX ORDER — GLUCOSAMINE HCL/CHONDROITIN SU 500-400 MG
CAPSULE ORAL
Qty: 100 EACH | Refills: 3 | Status: SHIPPED | OUTPATIENT
Start: 2017-10-18 | End: 2022-11-09

## 2017-10-18 RX ORDER — LANCETS
EACH MISCELLANEOUS
Qty: 100 EACH | Refills: 1 | Status: SHIPPED | OUTPATIENT
Start: 2017-10-18 | End: 2022-11-09

## 2017-10-18 RX ORDER — ALBUTEROL SULFATE 0.83 MG/ML
3 SOLUTION RESPIRATORY (INHALATION) 4 TIMES DAILY
Qty: 360 ML | Refills: 1 | Status: SHIPPED | OUTPATIENT
Start: 2017-10-18 | End: 2018-01-11

## 2017-10-18 RX ORDER — FUROSEMIDE 40 MG
40 TABLET ORAL 2 TIMES DAILY
Qty: 60 TABLET | Refills: 0 | Status: SHIPPED | OUTPATIENT
Start: 2017-10-18 | End: 2018-01-10

## 2017-10-18 RX ORDER — CEFPODOXIME PROXETIL 200 MG/1
200 TABLET, FILM COATED ORAL 2 TIMES DAILY
Qty: 3 TABLET | Refills: 0 | Status: SHIPPED | OUTPATIENT
Start: 2017-10-18 | End: 2017-10-20

## 2017-10-18 RX ADMIN — POTASSIUM CHLORIDE 20 MEQ: 1.5 POWDER, FOR SOLUTION ORAL at 09:44

## 2017-10-18 RX ADMIN — THERA TABS 1 TABLET: TAB at 08:31

## 2017-10-18 RX ADMIN — CEFTRIAXONE SODIUM 2 G: 2 INJECTION, POWDER, FOR SOLUTION INTRAMUSCULAR; INTRAVENOUS at 00:01

## 2017-10-18 RX ADMIN — ATORVASTATIN CALCIUM 40 MG: 40 TABLET, FILM COATED ORAL at 08:30

## 2017-10-18 RX ADMIN — ENOXAPARIN SODIUM 40 MG: 40 INJECTION SUBCUTANEOUS at 08:31

## 2017-10-18 RX ADMIN — CARVEDILOL 6.25 MG: 6.25 TABLET, FILM COATED ORAL at 08:31

## 2017-10-18 RX ADMIN — IPRATROPIUM BROMIDE AND ALBUTEROL SULFATE 3 ML: .5; 3 SOLUTION RESPIRATORY (INHALATION) at 07:46

## 2017-10-18 RX ADMIN — FUROSEMIDE 40 MG: 40 TABLET ORAL at 08:30

## 2017-10-18 RX ADMIN — FOLIC ACID 1 MG: 1 TABLET ORAL at 08:30

## 2017-10-18 RX ADMIN — PANTOPRAZOLE SODIUM 40 MG: 40 TABLET, DELAYED RELEASE ORAL at 08:30

## 2017-10-18 RX ADMIN — ASPIRIN 81 MG CHEWABLE TABLET 81 MG: 81 TABLET CHEWABLE at 08:30

## 2017-10-18 RX ADMIN — LISINOPRIL 20 MG: 20 TABLET ORAL at 08:30

## 2017-10-18 RX ADMIN — Medication 2 G: at 10:32

## 2017-10-18 RX ADMIN — PREDNISONE 20 MG: 20 TABLET ORAL at 08:30

## 2017-10-18 RX ADMIN — Medication 100 MG: at 08:30

## 2017-10-18 ASSESSMENT — ACTIVITIES OF DAILY LIVING (ADL)
ADLS_ACUITY_SCORE: 9

## 2017-10-18 NOTE — PROGRESS NOTES
Care Coordination:  Per Dr Munoz, pt will be discharging today and needs assistance with scheduling follow up appts. Met with pt again and he and wife are agreeable to assistance with follow up. They prefer [facility] in [place]. Pt also needs Cardiology follow up with [facility] NP/cards.    Follow up scheduled:  1. [facility] in [place] with Radha Arango NP on 11/1 at 2:30.  2. [facility] in [place] with Dr Carson on 11/20 at 8:45.  3. [facility] in [place] with Lui Arango on Tues 10/24 at 9:00. His office is on the second floor room 230.    Marisol Elder RN CTS

## 2017-10-18 NOTE — CONSULTS
Care Transition Initial Assessment - RN    Reason For Consult: care coordination/care conference, discharge planning   Met with: Patient and Family.    DATA   Active Problems:    Sepsis (H)       Cognitive Status: alert and oriented.  Primary Care Clinic Name: [facility] (in the process of finding new PCP)     Contact information and PCP information verified: Yes    Lives With: spouse  Living Arrangements: apartment  Quality Of Family Relationships: supportive, helpful, involved  Description of Support System: Supportive, Involved   Who is your support system?: Wife   Support Assessment: Adequate family and caregiver support     Insurance concerns: No Insurance issues identified  Pt states he has new insurance effective 10/1  ASSESSMENT  Patient currently receives the following services:  none        Identified issues/concerns regarding health management:   Met with pt and wife at bedside. He lives at home with his spouse. He does not have any in home services. He does not currently use home oxygen. He is currently on O2 2L, possible need for new home oxygen. Weaning as able.    Pt states he has new insurance as of 10/1 with [employer]. He has gone to [facility] in [place] up to now. He is looking into new PCP in the area according to his insurance coverage. He will make follow up appt himself. Importance of follow up and establishing new PCP was stressed to pt and wife.    We discussed his PNA and CHF dc. Action plan given and discussed for PNA. Pt states he does not have CHF. He states he is supposed to follow low salt diet. We reviewed importance of following low salt diet with heart conditions and discussed daily weights. Pt was given a scale for home use and instructed on daily weights.    PLAN  Patient/family is agreeable to the plan?  Yes  Patient anticipates discharging to home w spouse .        Patient anticipates needs for home equipment: No  Discharge Planner   Discharge Plans in progress: home with  spouse  Barriers to discharge plan: may need home oxygen  Plan/Disposition: Home   Appointments: Pt is in the process of establishing new PCP with new Ins coverage. He will make his own follow up appt      Care  (CTS) will continue to follow as needed.    Marisol Elder RN CTS

## 2017-10-18 NOTE — PROGRESS NOTES
"[facility] RCAT     Date: 10-17-17    Admission Dx: Sepsis     Pulmonary History: Smoker    Home Nebulizer/MDI Use: Albuterol    Home Oxygen: None    Acuity Level (RCAT flow sheet): Acuity Level 3    Aerosol Therapy initiated: Duoneb QID, Albuterol Q2 prn      Pulmonary Hygiene initiated: Deep breathing techniques      Volume Expansion initiated: IS      Current Oxygen Requirements: RA-2L with activity    Current SpO2: 93%    Re-evaluation date: 10-20-17    Patient Education: Patient aware of side effects and benefits of medications encouraged deep breathing      See \"RT Assessments\" flow sheet for patient assessment scoring and Acuity Level Details.             "

## 2017-10-18 NOTE — PROGRESS NOTES
Received intake call for home oxygen at 2pm. Reviewed patient's chart; Patient qualifies under Medicare guidelines and all documentation is in the chart including a good order.   2:23PM- Called to offer choice. Explained to patient that with his insurance we are not first tier and that he might have to pay more going through us because of his insurance. Let him know that [facility] is first tier, but he is more that welcome to have us set him up. He said that he thinks that he would have to pay more going to [facility] and that he will probably stay within his insurance and go with [facility]. Let patient know that we will send all information to [facility] once we receive it all and then [facility] should be giving him a call once they get the paperwork.

## 2017-10-18 NOTE — PROGRESS NOTES
Patient has been assessed for Home Oxygen needs.  Oxygen readings:   *   RA - at rest  Pulse oximetry SPO2 - 88%  *   RA - during activity/with exercise SPO2   85%  *   O2 at  2 liters/minute (at rest) ...SPO2 97 %  *   O2 at  4 liters/minute (during activity/with exercise) ...SPO2 88 %

## 2017-10-18 NOTE — PROGRESS NOTES
Discharge instructions given. Patient taught to use his own glucometer including calibration checks. Patient able to use lancet on himself. Filled meds given for home use. Patient will be wheeled down to vehicle by staff. Patient will go home with new O2. [facility] arrived with o2 tank.

## 2017-10-18 NOTE — DISCHARGE INSTRUCTIONS
Oxygen Provider:  Arranged through [facility], If you have any questions for concerns please call the oxygen company directly at [phone]    Follow up scheduled:  1. [facility] in [place] with Radha Arango NP on 11/1 at 2:30.  2. [facility] in [place] with Dr Carson on 11/20 at 8:45.  3. [facility] clinic in [place] with Lui Arango on Tues 10/24 at 9:00. His office is on the second floor room 230.

## 2017-10-18 NOTE — PLAN OF CARE
Problem: Patient Care Overview  Goal: Plan of Care/Patient Progress Review  Outcome: Improving  Pt transferred to MS3 from ICU at 18:45 this evening.  Pain: No c/o pain  LOC: alert and oriented  Mobility: SBA.  Steady gait.  Lungs: diminished.  90-92% on 1 L O2.  (After walking over to MS3 from ICU on room air his sats had dropped into the 70's.) No shortness of breath reported.  Occas non-productive cough. Continuous pulse ox in place.  Tele: No tele  GI: 2 gm NA  : voiding without difficulty  IV: PIV saline locked  Other: Continue blood sugar checks, lipitor, coreg, PO lasix, rocephin.  Will try to wean off O2 and see how pt does without bi-pap during the noc before discharge 1-2 days according to MD note.

## 2017-10-18 NOTE — PROGRESS NOTES
Received intake call for home oxygen at 1:57pm. Reviewed patient's chart; Patient qualifies under Medicare guidelines and all documentation is in the chart including a good order.    2:23pm- Spoke with coordinator, Deann, confirmed we received the order and asked about the order. She believes the patient needs more with activity and is going to page the Dr to follow up. Explained patient has chosen to go with [facility], so we will send all documentation to them.  Ensured them the change in order will not hold up delivery of the portable tank.   2:50pm- Spoke with [facility], they confirmed they received the order and will begin processing. As on the phone, the new order was added, so faxed that to them as well.

## 2017-10-18 NOTE — DISCHARGE SUMMARY
[facility]    Discharge Summary  Hospitalist    Date of Admission:  10/9/2017  Date of Discharge:  10/18/2017  5:37 PM  Provider:  Jorge L Munoz DO, [facility]    Discharge Diagnoses   1.  Sepsis secondary to community acquired pneumonia  2.  Acute on chronic systolic CHF exacerbation with a mixed cardiomyopathy likely a combination of ischemic and alcohol  3.  Acute hypoxic respiratory failure related to #1 and #2, still requiring some oxygen at discharge  4.  Tobacco use disorder with cessation encouraged during his stay  5.  Hyperglycemia without a clear DM history, suspect related to sepsis and steroids    Other medical issues:  Past Medical History:   Diagnosis Date     Alcohol abuse      Hyperlipidemia      Hypertension      Myocardial infarction      Substance abuse      Tobacco abuse        History of Present Illness   Alfa Cm is an 57 year old male who presented with respiratory complaints/failure.  Please see the admission history and physical for full details.    Hospital Course   Alfa Cm was admitted on 10/9/2017.  The following problems were addressed during his hospitalization:    1.  Pneumonia with sepsis: Ultimately was felt likely community acquired pneumonia.  He steadily improved and had completed most his antibiotic course by discharge. His sepsis was aggressively treated earlier in his stay and resolved.  He has some COPD though didn't have a laquita exacerbation.  He was on steroids more for the severe pneumonia component but given his slow improvement a wean of steroid was performed.  It can be stopped in a few days.  He was also setup with ongoing albuterol but I think he should only need this scheduled for another few days.  I advised he could stop the albuterol and move to just as needed if he continued feeling better by the end of the week.    2.  CHF: The patient also had hypoxia/SOB related to this.  He was diuresed with improvement.  Cardiology followed  "during his stay.  The adjusted medications and he improved.  He was dischargedd on medications as below which included extra oral lasix.  He was also counseled on the importance of daily weights and sodium restriction.  In follow-up he needs a repeat echocardiogram.  Cardiology recommended checking a lexiscan stress in follow-up if his cardiac function doesn't recover by then.  They did not feel he needed further ischemic workup right now.    3.  Respiratory failure:  This was felt related to #1 and #2.  It considerably improved toward discharge and he was no longer requiring bipap.  He still had hypoxia though and required some ongoing oxygen at discharge that was arranged for him.  I suspect he can be weaned off the oxygen in a few weeks when he further recovers.  He was emphasized to wear the oxygen continuously.  He was also educated during his stay to not smoke.    Please see the medical record for further details of his stay.  I asked him to monitor glucoses a few more days but I suspect he won't need any ongoing glu treatment given the infection improvement and steroid decrease.  He should have a recheck fasting glucose in a few weeks.  I recommend a sleep study to be considered once over his acute illness.  On the day of discharge he was up walking well, feeling much better, and anxious to discharge.       Significant Results and Procedures   See below    Pending Results     Unresulted Labs Ordered in the Past 30 Days of this Admission     No orders found from 8/10/2017 to 10/10/2017.          Code Status   Full Code       Primary Care Physician   [facility]    Blood pressure 129/78, pulse 83, temperature 98.4  F (36.9  C), temperature source Oral, resp. rate 20, height 1.702 m (5' 7\"), weight 86.8 kg (191 lb 5.8 oz), SpO2 (!) 88 %.    Heart regular, lungs with good air movement upper and mild decreased base sounds, no wheezing, up walking in room.  O2 sats below 88% on room air.    Discharge Disposition "   Discharged to home    Consultations This Hospital Stay   PHARMACY TO DOSE VANCO  PHARMACY TO DOSE VANCO  CHEMICAL DEPENDENCY IP CONSULT  CARDIOLOGY IP CONSULT  CARE COORDINATOR IP CONSULT    Time Spent on this Encounter   I, Jorge L Munoz, personally saw the patient today and spent greater than 30 minutes discharging this patient.    Discharge Orders     Reason for your hospital stay   Pneumonia and heart failure     Follow Up and recommended labs and tests   Follow-up with [facility] cardiology 2 weeks nurse practitioner and 1 month cardiologist and for echocardiogram.  Follow-up and establish with primary provider in the next week.  You should have a basic metabolic panel lab draw the day of follow-up.     Activity   Your activity upon discharge: activity as tolerated with oxygen     Monitor and record   Take your weight everyday and write down the number.  If it increases more than 3 lbs from your initial weight when you get home contact the cardiology clinic.     When to contact your care team   Call if questions.  Notify provider if fevers, worsening shortness of breath, chest pain, other new medical concerns.     Discharge Instructions   Check pre-meal glucose one to two times daily and record them, if glucoses under 70 or over 200 call Dr. Vadim Willoughby     Oxygen Adult   [place] Oxygen Order 2 liter(s) by nasal cannula continuously at rest and 4 liter(s) by nasal cannula when active with use of portable tank. Expected treatment length is 2 months.. Test on conserving device as applicable.    Patients who qualify for home O2 coverage under the CMS guidelines require ABG tests or O2 sat readings obtained closest to, but no earlier than 2 days prior to the discharge, as evidence of the need for home oxygen therapy. Testing must be performed while patient is in the chronic stable state. See notes for O2 sats.    I certify that this patient, Alfa Cm has been under my care and that I, or a nurse  practitioner or physician's assistant working with me, had a face-to-face encounter that meets the face-to-face encounter requirements with this patient on 10/18/2017. The patient, Alfa Cm was evaluated or treated in whole, or in part, for the following medical condition, which necessitates the use of the ordered oxygen. Treatment Diagnosis: Pneumonia    Attending Provider: Jorge L Munoz DO [facility]  Physician signature: See electronic signature associated with these discharge orders  Date of Order: October 18, 2017     Diet   Follow this diet upon discharge: 2 gram salt restricted diet.  AVOID all alcohol.       Discharge Medications   Current Discharge Medication List      START taking these medications    Details   albuterol (2.5 MG/3ML) 0.083% neb solution Take 1 vial (2.5 mg) by nebulization 4 times daily  Qty: 360 mL, Refills: 1    Associated Diagnoses: Community acquired pneumonia of right lower lobe of lung (H)      carvedilol (COREG) 6.25 MG tablet Take 1 tablet (6.25 mg) by mouth 2 times daily (with meals)  Qty: 60 tablet, Refills: 1    Associated Diagnoses: Congestive heart failure, unspecified congestive heart failure chronicity, unspecified congestive heart failure type (H)      predniSONE (DELTASONE) 10 MG tablet Take 1 tablet (10 mg) by mouth daily for 4 days then stop  Qty: 4 tablet, Refills: 0    Associated Diagnoses: Community acquired pneumonia of right lower lobe of lung (H)      furosemide (LASIX) 40 MG tablet Take 1 tablet (40 mg) by mouth 2 times daily  Qty: 60 tablet, Refills: 0    Associated Diagnoses: Congestive heart failure, unspecified congestive heart failure chronicity, unspecified congestive heart failure type (H)      polyethylene glycol (MIRALAX/GLYCOLAX) Packet Take 17 g by mouth daily as needed for constipation  Qty: 7 packet    Associated Diagnoses: Constipation, unspecified constipation type      cefpodoxime (VANTIN) 200 MG tablet Take 1 tablet (200 mg) by mouth 2  times daily for 3 doses to finish course  Qty: 3 tablet, Refills: 0    Associated Diagnoses: Community acquired pneumonia of right lower lobe of lung (H)      order for DME Equipment being ordered: Other: Nebulizer machine  Treatment Diagnosis: Pneumonia, reactive airway  Qty: 1 Device, Refills: 0    Associated Diagnoses: Community acquired pneumonia of right lower lobe of lung (H)      blood glucose monitoring (NO BRAND SPECIFIED) meter device kit Use to test blood sugar as directed.  Qty: 1 kit, Refills: 0    Comments: Preferred blood glucose meter OR supplies to accompany: glucometer per insurance  Associated Diagnoses: Hyperglycemia      blood glucose monitoring (NO BRAND SPECIFIED) test strip Use to test blood sugar 4 times daily or as directed.  Qty: 100 strip, Refills: 6    Comments: To accompany: glucometer per insurance.  Associated Diagnoses: Hyperglycemia      blood glucose calibration (NO BRAND SPECIFIED) solution Use to calibrate blood glucose monitor as needed as directed.  Qty: 1 Bottle, Refills: 3    Comments: To accompany: Glucometer per insurance.  Associated Diagnoses: Hyperglycemia      thin (NO BRAND SPECIFIED) lancets Use with lanceting device.  Qty: 100 each, Refills: 1    Comments: To accompany: per insurance.  Associated Diagnoses: Hyperglycemia      alcohol swab prep pads Use to swab area of injection/kylee as directed.  Qty: 100 each, Refills: 3    Associated Diagnoses: Hyperglycemia         CONTINUE these medications which have CHANGED    Details   pantoprazole (PROTONIX) 40 MG EC tablet Take 1 tablet (40 mg) by mouth every morning  Qty: 30 tablet, Refills: 0    Associated Diagnoses: Gastroesophageal reflux disease, esophagitis presence not specified         CONTINUE these medications which have NOT CHANGED    Details   lisinopril (PRINIVIL/ZESTRIL) 20 MG tablet Take 20 mg by mouth daily      multivitamin, therapeutic (THERA-VIT) TABS tablet Take 1 tablet by mouth daily      ferrous  sulfate (IRON) 325 (65 FE) MG tablet Take 325 mg by mouth daily (with breakfast)      ASPIRIN ADULT LOW STRENGTH PO Take 81 mg by mouth daily      ATORVASTATIN CALCIUM PO Take 40 mg by mouth daily      albuterol (PROAIR HFA, PROVENTIL HFA, VENTOLIN HFA) 108 (90 BASE) MCG/ACT inhaler Inhale 1-2 puffs into the lungs every 6 hours as needed for shortness of breath / dyspnea or wheezing           Allergies   Allergies   Allergen Reactions     Pollen Extract      Data     Recent Labs  Lab 10/18/17  0738 10/16/17  0602   HGB 12.9*  --    PLT  --  323       Recent Labs  Lab 10/18/17  0738 10/17/17  0510 10/16/17  0602 10/15/17  0525    136  --  138   POTASSIUM 3.6 4.0 3.6 3.7   CHLORIDE 100 99  --  101   CO2 31 32  --  33*   ANIONGAP 5 5  --  4   GLC 86 97  --  113*   BUN 19 20  --  24   CR 0.86 0.77 0.84 0.84   GFRESTIMATED >90 >90 >90 >90   GFRESTBLACK >90 >90 >90 >90   GRANT 8.5 8.5  --  8.5   MAG 2.0  --   --   --    PHOS 3.6  --   --   --      Results for orders placed or performed during the hospital encounter of 10/09/17   Chest  XR, 1 view PORTABLE    Narrative    CHEST SINGLE VIEW PORTABLE  10/9/2017 11:13 PM     HISTORY: Sepsis.    COMPARISON: 9/30/2016.    FINDINGS: Hypoinflated lungs. An apparent hazy opacity in the right  lung base. The lungs are otherwise clear. Possible cardiomegaly.      Impression    IMPRESSION:  1. An apparent hazy opacity is present in the right lung base. This  could represent a portion of the right hemidiaphragm, but the  appearance is different than the comparison study dated 9/30/2016 and  therefore a pulmonary opacity such as pneumonia is also possible.  2. No other findings suspicious for active cardiopulmonary disease.    TAYO JOHANSEN MD   XR Chest Port 1 View    Narrative    CHEST PORTABLE ONE VIEW 10/11/2017 9:47 AM     HISTORY: Hypoxia.    COMPARISON: 10/9/2017.      Impression    IMPRESSION: Interval development of diffuse bilateral pulmonary  infiltrates. Heart size  appears enlarged but this may be related to  the AP portable technique alone.    LYNN MILLER MD   XR Chest Port 1 View    Narrative    CHEST PORTABLE ONE VIEW  10/13/2017 5:46 PM     HISTORY: Persistent dyspnea     COMPARISON: 10/11/2017      Impression    IMPRESSION: The cardiac silhouette is enlarged but stable. There are  persistent diffuse bilateral airspace opacities. In the upper lobes  these opacities have improved since the previous examination. No new  infiltrates are seen.    LEILA LYNN MD   XR Chest 1 View    Narrative    CHEST ONE VIEW  10/14/2017 9:43 AM     HISTORY: Pneumonia, CHF, hypoxia.    COMPARISON: 10/13/2017.      Impression    IMPRESSION: No significant change of bibasilar indeterminant pulmonary  opacities that may represent ongoing airspace disease. Bilateral mild  pulmonary edema.    BHAVYA HALL MD   XR Chest Port 1 View    Narrative    XR CHEST PORT 1 VW  10/17/2017 5:35 PM     HISTORY:  PNA    COMPARISON: Film dated 10/14/2017    FINDINGS:  The heart is enlarged. Small pleural effusions appear to be  present. There is increased opacity in the left base consistent with  associated infiltrate or atelectasis. The pulmonary vascular  congestion is improved.      Impression    IMPRESSION:  1. Slight increase in the pleural effusions.  2. Increase in the left basilar opacity consistent with infiltrate or  atelectasis.    BAYLEE CHUNG MD     Echo:  Interpretation Summary     Left ventricular systolic function is moderately reduced.  The right ventricle is moderately dilated.  The right ventricular systolic function is severely reduced.  Right ventricular systolic pressure is elevated, consistent with mild to  moderate pulmonary hypertension.  The ascending aorta is Mildly dilated.  The visual ejection fraction is estimated at 30-35%.  The study was technically difficult.

## 2017-10-18 NOTE — PLAN OF CARE
End of Shift Summary.  For vital signs and complete assessments, please see documentation flowsheets.     Pertinent assessments: pt alert and oriented, up ad reginaldo in the room. Pt off bipap all night, did require oxygen at 2 liters to maintain oxygen sats above 90, was 86% on room air. Pt denies pain, on Rocephin for PNA.   Major Shift Events: none  Plan (Upcoming Events): continue current cares  Discharge/Transfer Needs: hopes to discharge today  Bedside Shift Report Completed :   Bedside Safety Check Completed:

## 2017-10-19 ENCOUNTER — CARE COORDINATION (OUTPATIENT)
Dept: CARE COORDINATION | Facility: CLINIC | Age: 57
End: 2017-10-19

## 2017-10-19 ENCOUNTER — CARE COORDINATION (OUTPATIENT)
Dept: CARDIOLOGY | Facility: CLINIC | Age: 57
End: 2017-10-19

## 2017-10-19 NOTE — PROGRESS NOTES
Called patient and left  to discuss any post hospital d/c questions he may have, review medication changes, and confirm f/u appts. RN advised patient in  that he has an apt scheduled on 11/1/17 with MARGARET Radha Arango. Patient advised in  to call clinic with any cardiac related questions or concerns prior to his apt on 11/1/17.

## 2017-10-19 NOTE — PROGRESS NOTES
Transition Communication Hand-off for Care Transitions to Next Level of Care Provider    Name: Alfa Cm  MRN #: [id number]  Primary Care Provider: Tulio Arango  Primary Care MD Name: Red Nba  Primary Clinic: [address] [facility] [address]  Primary Care Clinic Name: [facility]  Reason for Hospitalization:  Hypokalemia [E87.6]  Septic shock (H) [A41.9, R65.21]  Community acquired pneumonia of right lower lobe of lung (H) [J18.1]  Admit Date/Time: 10/9/2017  9:59 PM  Discharge Date: 10/18/77  Payor Source: Payor: HUMANA / Plan: HU[employer]NA MEDICARE ADVANTAGE / Product Type: Medicare /     Readmission Assessment Measure (RALF) Risk Score/category: Average    Reason for Communication Hand-off Referral: Admission diagnoses: CHF--NEW    Discharge Plan: Home to self care with spouse     Concern for non-adherence with plan of care:   NO  Discharge Needs Assessment:  Needs       Most Recent Value    Anticipated Changes Related to Illness none    Equipment Currently Used at Home none    # of Referrals Placed by CTS Scheduled Follow-up appointments        Follow-up specialty is recommended: Yes  CARDIOLOGY.    Follow-up plan:  Future Appointments  Date Time Provider Department Center   10/24/2017 9:00 AM Tulio Arango MD [facility]X   11/1/2017 2:30 PM Radha Arango APRN CNP [facility]   11/20/2017 8:45 AM Alvarado Carson MD [facility]       Any outstanding tests or procedures:    Procedures     Future Labs/Procedures    Oxygen Adult     Comments:    New Home Oxygen Order 2 liter(s) by nasal cannula continuously at rest and 4 liter(s) by nasal cannula when active with use of portable tank. Expected treatment length is 2 months.. Test on conserving device as applicable.    Patients who qualify for home O2 coverage under the CMS guidelines require ABG tests or O2 sat readings obtained closest to, but no earlier than 2 days prior to the discharge, as evidence of the need for  home oxygen therapy. Testing must be performed while patient is in the chronic stable state. See notes for O2 sats.    I certify that this patient, Alfa Cm has been under my care and that I, or a nurse practitioner or physician's assistant working with me, had a face-to-face encounter that meets the face-to-face encounter requirements with this patient on 10/18/2017. The patient, Alfa Cm was evaluated or treated in whole, or in part, for the following medical condition, which necessitates the use of the ordered oxygen. Treatment Diagnosis: Pneumonia    Attending Provider: Jorge L Munoz DO [facility]  Physician signature: See electronic signature associated with these discharge orders  Date of Order: October 18, 2017                Key Recommendations:  We discussed his PNA and CHF dc. Action plan given and discussed for PNA. Pt states he does not have CHF. He states he is supposed to follow low salt diet. We reviewed importance of following low salt diet with heart conditions and discussed daily weights. Pt was given a scale for home use and instructed on daily weights.  He was DC'd home on new O2. F/U appointment scheduled with Dr. Arango - this will be pt new PCP.    Destiny Ramesh, RN,BSN, CTS  [facility]  Care Coordination  [phone]      AVS/Discharge Summary is the source of truth; this is a helpful guide for improved communication of patient story

## 2017-10-19 NOTE — PROGRESS NOTES
Received call from patient relations, Sunita that pt DC'd yesterday and did not get his nebulizer machine.  Spoke with Pharmacy Liaison Sue, she was able to get nebulizer filled at the [facility] pharmacy.      I called pt to let him know he could  his nebulizer at [facility] and they close at 6pm.  Per Sue, will cost $35.  Patient was happy with this and will be buy to  today.    Destiny Ramesh, RN,BSN, CTS  [facility]  Care Coordination  [phone]

## 2017-10-19 NOTE — PROGRESS NOTES
Clinic Care Coordination Contact  OUTREACH    Referral Information:  Referral Source: [facility]  Reason for Contact---Hospitalization 10/9-10/18/2017--Pneumonia /Heart Failure   Care Conference: No     Universal Utilization:   ED Visits in last year: 0  Hospital visits in last year: 2  Last PCP appointment:  (NA)  Missed Appointments:  (NA)  Concerns: NO  Multiple Providers or Specialists: PCP-Cardiology     Clinic Care Coordination Contact  [facility]/Voicemail    Referral Source: [facility]  Clinical Data: Care Coordinator Outreach  Outreach attempted x 1.  Left message on voicemail with call back information and requested return call.  Plan: . Care Coordinator will try to reach patient again in 1-2 business days.  Kathrine Beatty RN / Clinical Care Coordinator     [address] [address]  [email or web address]  Office :  [phone] / Fax :  [phone]

## 2017-10-23 NOTE — PROGRESS NOTES
Clinic Care Coordination Contact  [facility]/Voicemail    Referral Source: [facility]  Clinical Data: Care Coordinator Outreach  Outreach attempted x 2.  Left message with patients wife that patient has an appointment tomorrow CC gave  call back information .  Plan: Care Coordinator will meet the patient face to face at PCP visit 10/24/2017    Kathrine Beatty RN / Clinical Care Coordinator     [address] [address]  [email or web address]  Office :  [phone] / Fax :  [phone]

## 2017-10-24 ENCOUNTER — CARE COORDINATION (OUTPATIENT)
Dept: CARE COORDINATION | Facility: CLINIC | Age: 57
End: 2017-10-24

## 2017-10-24 NOTE — LETTER
[facility]  Complex Care Plan  About Me  Patient Name:  Alfa Cm    YOB: 1960  Age:   57 year old   [place] MRN: [id number] Telephone Information:     Home Phone [phone]   Mobile [phone]       Address:    [address] [facility] [address] Email address:  [email or web address]      Emergency Contact(s)  Name Relationship Lgl Grd Work Phone Home Phone Mobile Phone   AIMEE HAMPTON Spouse  [phone]           Primary language:  English     needed? No   [place] [employer]:  [phone] op. 1  Other communication barriers: No  Preferred Method of Communication:  Letter, Phone  Current living arrangement: I live in a private home with spouse  Mobility Status/ Medical Equipment:  (NA)  Other information to know about me:    Health Maintenance  Health Maintenance Reviewed: Not assessed    My Access Plan  Medical Emergency 911   Primary Clinic Line [facility]- [phone]   24 Hour Appointment Line [phone] or  [phone]) (toll-free)   24 Hour Nurse Line [phone] (toll-free)   Preferred Urgent Care [facility], [phone]   Preferred Hospital [facility]  [phone]   Preferred Pharmacy [facility] [address] [place] [address] DR MORALES AT [address]     Behavioral Health Crisis Line The National Suicide Prevention Lifeline at 1-293.778.7422 or 911     My Care Team Members  Patient Care Team       Relationship Specialty Notifications Start End    Tulio Arango MD PCP - General Internal Medicine  10/19/17     Phone: [phone] Pager: [phone] Fax: [phone]        [address] [facility] [address]    Kathrine Beatty RN Clinic Care Coordinator  Admissions 10/19/17     Phone: [phone] Fax: [phone]             My Care Plans  Self Management and  Treatment Plan  Goals and (Comments)  Goal #1: I will follow the CHF action plan -I will seek medical help if I am experiencing increased symptoms of concern       0% of goal reached    Action Plans on File: CHF  Advance Care Plans/Directives Type:   Type Advanced Care Plans/Directives:  (NA)    My Medical and Care Information  Problem List   Patient Active Problem List   Diagnosis     ST elevation myocardial infarction involving right coronary artery (H)     Hyperlipidemia     Benign essential hypertension     Coronary artery disease involving native coronary artery     Tobacco dependence syndrome     STEMI (ST elevation myocardial infarction) (H)     Sepsis (H)      Current Medications and Allergies:  See printed Medication Report.    Care Coordination Start Date: 10/24/17   Frequency of Care Coordination: 2-4 weeks    Form Last Updated: 10/24/2017

## 2017-10-24 NOTE — LETTER
[facility]  [address] [address]  [phone]      10/24/2017       Alfa Cm  [address] [facility] [address]        Dear Alfa,    It was a pleasure to speak with you over the phone on Tuesday .  I would like to provide you with the enclosed congested heart failure action plan and educational materials for your records.  As part of care coordination, we are developing care plans to assist in accomplishing your health care goals.  When we speak next, please feel free to let me know if you want to add or change any information on your care plans.    As always, feel free to contact me if you have any questions or concerns.  I look forward to working with you in the effort to achieve your health care and wellness goals .        Sincerely,        Kathrine Beatty RN, Care Coordinator  [facility]   [email or web address]   [phone]

## 2017-10-24 NOTE — PROGRESS NOTES
"Clinic Care Coordination Contact  OUTREACH    Referral Information:  Referral Source: CTS  Reason for Contact---Hospitalization 10/9-10/18/2017--Pneumonia /Heart Failure     Care Conference: No     Universal Utilization:   ED Visits in last year: 0  Hospital visits in last year: 2  Last PCP appointment:  (NA)  Missed Appointments:  (NA)  Concerns: NO  Multiple Providers or Specialists: PCP-Cardiology     Clinical Concerns:    Clinical Pathway Name: Heart Failure  Clinical Pathway: Clinic Care Coordination Heart Failure Assessment:  Day of hospital discharge: 10/18/2017  Home scale available:  Yes  Home scale weight this morning  Stable no weight gain   Following a low sodium diet:  Yes      Heart Failure Zones sheet on refrigerator or available: Mailed Action Plan   What Heart Failure Zone currently in:  Green  Any increased SOB since hospital discharge:  No  Any increased edema since hospital discharge:  No  What number to call for YELLOW zones:  [phone]   Medications:  \"How many new medications are you on since your hospitalization?\"  2 or more -- Epic MTM referral needed   \"How many of your current medicines changed (dose, timing, name, etc.) while you were in the hospital?\"  0 - 1  \"Do you have questions about your medications?\"  No  For patients on insulin: \"Did you start on insulin in the hospital or did you have your insulin dose changed?\"  No  Is patient on Warfarin?  No  Is Ejection Fraction <40%: No 45%  Medication reconciliation completed?  Yes   Was MTM referral placed?  No  When is the first appointment with the CHF clinic:11/1/2017  When is the appointment with PCP: 10/31/2017    Medication Management:  Reviewed     Functional Status:  Mobility Status:  (NA)  Equipment Currently Used at Home: none  Psychosocial:  Current living arrangement:: I live in a private home with spouse       Resources and Interventions:  Current Resources:  (NA);  (NA)  PAS Number:  (NA)  Senior Linkage Line Referral " Placed:  (NA)  Advanced Care Plans/Directives on file:: No  Referrals Placed:  (NA)     Goals:   Goal 1 Statement: I will follow the CHF action plan -I will seek medical help if experiencing increased symptoms of concern   Goal 1 Progression Percent: 0%  Goal 1 Progression Date: 10/24/17        Barriers: Missed hospital follow up appointment today   Strengths: CC rescheduled to 10/31/2017  Patient/Caregiver understanding: Patient expresses good understanding of discharge instructions   Frequency of Care Coordination: 2-4 weeks   Upcoming appointment: 10/31/2017     Plan:  Hear failure action plan and CHF educational materials mailed to the patient today    CC will follow up in1-2 weeks     Kathrine Beatty RN / Clinical Care Coordinator     [address] [address]  [email or web address]  Office :  [phone] / Fax :  [phone]

## 2017-10-31 ENCOUNTER — OFFICE VISIT (OUTPATIENT)
Dept: INTERNAL MEDICINE | Facility: CLINIC | Age: 57
End: 2017-10-31
Payer: COMMERCIAL

## 2017-10-31 VITALS
DIASTOLIC BLOOD PRESSURE: 72 MMHG | WEIGHT: 196 LBS | HEART RATE: 71 BPM | BODY MASS INDEX: 30.7 KG/M2 | SYSTOLIC BLOOD PRESSURE: 102 MMHG | TEMPERATURE: 97.6 F | OXYGEN SATURATION: 95 %

## 2017-10-31 DIAGNOSIS — I10 BENIGN ESSENTIAL HYPERTENSION: ICD-10-CM

## 2017-10-31 DIAGNOSIS — D50.9 IRON DEFICIENCY ANEMIA, UNSPECIFIED IRON DEFICIENCY ANEMIA TYPE: ICD-10-CM

## 2017-10-31 DIAGNOSIS — I25.10 CORONARY ARTERY DISEASE INVOLVING NATIVE CORONARY ARTERY OF NATIVE HEART WITHOUT ANGINA PECTORIS: Primary | ICD-10-CM

## 2017-10-31 DIAGNOSIS — A41.9 SEPSIS, DUE TO UNSPECIFIED ORGANISM: ICD-10-CM

## 2017-10-31 DIAGNOSIS — J18.9 PNEUMONIA OF BOTH LOWER LOBES DUE TO INFECTIOUS ORGANISM: ICD-10-CM

## 2017-10-31 DIAGNOSIS — I50.9 CONGESTIVE HEART FAILURE, UNSPECIFIED CONGESTIVE HEART FAILURE CHRONICITY, UNSPECIFIED CONGESTIVE HEART FAILURE TYPE: ICD-10-CM

## 2017-10-31 LAB
BASOPHILS # BLD AUTO: 0 10E9/L (ref 0–0.2)
BASOPHILS NFR BLD AUTO: 0.3 %
DIFFERENTIAL METHOD BLD: ABNORMAL
EOSINOPHIL # BLD AUTO: 0.2 10E9/L (ref 0–0.7)
EOSINOPHIL NFR BLD AUTO: 2.4 %
ERYTHROCYTE [DISTWIDTH] IN BLOOD BY AUTOMATED COUNT: 14.9 % (ref 10–15)
HCT VFR BLD AUTO: 40.7 % (ref 40–53)
HGB BLD-MCNC: 13.1 G/DL (ref 13.3–17.7)
IRON SATN MFR SERPL: 27 % (ref 15–46)
IRON SERPL-MCNC: 83 UG/DL (ref 35–180)
LYMPHOCYTES # BLD AUTO: 2.3 10E9/L (ref 0.8–5.3)
LYMPHOCYTES NFR BLD AUTO: 28.4 %
MCH RBC QN AUTO: 30.2 PG (ref 26.5–33)
MCHC RBC AUTO-ENTMCNC: 32.2 G/DL (ref 31.5–36.5)
MCV RBC AUTO: 94 FL (ref 78–100)
MONOCYTES # BLD AUTO: 0.6 10E9/L (ref 0–1.3)
MONOCYTES NFR BLD AUTO: 7.7 %
NEUTROPHILS # BLD AUTO: 4.9 10E9/L (ref 1.6–8.3)
NEUTROPHILS NFR BLD AUTO: 61.2 %
PLATELET # BLD AUTO: 335 10E9/L (ref 150–450)
RBC # BLD AUTO: 4.34 10E12/L (ref 4.4–5.9)
TIBC SERPL-MCNC: 313 UG/DL (ref 240–430)
WBC # BLD AUTO: 7.9 10E9/L (ref 4–11)

## 2017-10-31 PROCEDURE — 83540 ASSAY OF IRON: CPT | Performed by: INTERNAL MEDICINE

## 2017-10-31 PROCEDURE — 36415 COLL VENOUS BLD VENIPUNCTURE: CPT | Performed by: INTERNAL MEDICINE

## 2017-10-31 PROCEDURE — 85025 COMPLETE CBC W/AUTO DIFF WBC: CPT | Performed by: INTERNAL MEDICINE

## 2017-10-31 PROCEDURE — 99495 TRANSJ CARE MGMT MOD F2F 14D: CPT | Performed by: INTERNAL MEDICINE

## 2017-10-31 PROCEDURE — 83550 IRON BINDING TEST: CPT | Performed by: INTERNAL MEDICINE

## 2017-10-31 PROCEDURE — 80061 LIPID PANEL: CPT | Performed by: INTERNAL MEDICINE

## 2017-10-31 PROCEDURE — 80053 COMPREHEN METABOLIC PANEL: CPT | Performed by: INTERNAL MEDICINE

## 2017-10-31 RX ORDER — ATORVASTATIN CALCIUM 40 MG/1
40 TABLET, FILM COATED ORAL DAILY
COMMUNITY
Start: 2017-10-31 | End: 2018-01-10

## 2017-10-31 RX ORDER — LISINOPRIL 20 MG/1
20 TABLET ORAL DAILY
COMMUNITY
Start: 2017-10-31 | End: 2018-01-10

## 2017-10-31 NOTE — NURSING NOTE
"Chief Complaint   Patient presents with     Hospital F/U       Initial /72  Pulse 71  Temp 97.6  F (36.4  C) (Oral)  Wt 196 lb (88.9 kg)  SpO2 95%  BMI 30.7 kg/m2 Estimated body mass index is 30.7 kg/(m^2) as calculated from the following:    Height as of 10/10/17: 5' 7\" (1.702 m).    Weight as of this encounter: 196 lb (88.9 kg).  Medication Reconciliation: complete   Hawa Ramos CMA      "

## 2017-10-31 NOTE — PROGRESS NOTES
SUBJECTIVE:   Alfa Cm is a 57 year old male who presents to clinic today for the following health issues:      Was recommended to have a basic metabolic panel lab draw the day of follow-up.    Hospital Follow-up Visit:    Hospital/Nursing Home/IP Rehab Facility: [facility]  Date of Admission: 10/9/17  Date of Discharge: 10/18/17  Reason(s) for Admission: SOB - pneumonia and CHF            Problems taking medications regularly:  None       Medication changes since discharge: None       Problems adhering to non-medication therapy:  Has appts with cardiology made    Summary of hospitalization:  [facility] discharge summary reviewed  Diagnostic Tests/Treatments reviewed.  Follow up needed: none  Other Healthcare Providers Involved in Patient s Care:         None  Update since discharge: improved.     Post Discharge Medication Reconciliation: discharge medications reconciled, continue medications without change.  Plan of care communicated with patient     Coding guidelines for this visit:  Type of Medical   Decision Making Face-to-Face Visit       within 7 Days of discharge Face-to-Face Visit        within 14 days of discharge   Moderate Complexity [id number]   High Complexity 57841 [id number]     Since D/C, breathing is easier, no coughing, no fevers, no chills, no coughing.   Appetite better.   No lower extremity edema   No PND,  no orthopena.     Checking BGs at home, results around 120-130.     Will be mving to [place] in next month or two.         Lab Results   Component Value Date    A1C 5.6 10/11/2017            Problem list and histories reviewed & adjusted, as indicated.  Additional history: as documented        Reviewed and updated as needed this visit by clinical staff       Reviewed and updated as needed this visit by Provider           Past Medical History:  ---------------------------  Past Medical History:   Diagnosis Date     Alcohol abuse      Coronary artery  disease involving native coronary artery without angina pectoris 2011    angiplasty, stent placed at [facility]     Hyperlipidemia      Hypertension      Myocardial infarction      Substance abuse      Tobacco abuse        Past Surgical History:  ---------------------------  Past Surgical History:   Procedure Laterality Date     [place] PATIENT HAS A CORONARY ARTERY STENT         Current Medications:  ---------------------------  Current Outpatient Prescriptions   Medication Sig Dispense Refill     Fexofenadine HCl (ALLEGRA ALLERGY PO)        albuterol (2.5 MG/3ML) 0.083% neb solution Take 1 vial (2.5 mg) by nebulization 4 times daily 360 mL 1     carvedilol (COREG) 6.25 MG tablet Take 1 tablet (6.25 mg) by mouth 2 times daily (with meals) 60 tablet 1     furosemide (LASIX) 40 MG tablet Take 1 tablet (40 mg) by mouth 2 times daily 60 tablet 0     pantoprazole (PROTONIX) 40 MG EC tablet Take 1 tablet (40 mg) by mouth every morning 30 tablet 0     order for DME Equipment being ordered: Other: Nebulizer machine  Treatment Diagnosis: Pneumonia, reactive airway 1 Device 0     blood glucose monitoring (NO BRAND SPECIFIED) meter device kit Use to test blood sugar as directed. 1 kit 0     blood glucose monitoring (NO BRAND SPECIFIED) test strip Use to test blood sugar 4 times daily or as directed. 100 strip 6     blood glucose calibration (NO BRAND SPECIFIED) solution Use to calibrate blood glucose monitor as needed as directed. 1 Bottle 3     thin (NO BRAND SPECIFIED) lancets Use with lanceting device. 100 each 1     alcohol swab prep pads Use to swab area of injection/kylee as directed. 100 each 3     lisinopril (PRINIVIL/ZESTRIL) 20 MG tablet Take 20 mg by mouth daily       multivitamin, therapeutic (THERA-VIT) TABS tablet Take 1 tablet by mouth daily       ferrous sulfate (IRON) 325 (65 FE) MG tablet Take 325 mg by mouth daily (with breakfast)       ASPIRIN ADULT LOW STRENGTH PO Take 81 mg by mouth daily        ATORVASTATIN CALCIUM PO Take 40 mg by mouth daily       albuterol (PROAIR HFA, PROVENTIL HFA, VENTOLIN HFA) 108 (90 BASE) MCG/ACT inhaler Inhale 1-2 puffs into the lungs every 6 hours as needed for shortness of breath / dyspnea or wheezing         Allergies:  -------------  Allergies   Allergen Reactions     Pollen Extract        Social History:  -------------------  Social History     Social History     Marital status:      Spouse name: N/A     Number of children: N/A     Years of education: N/A     Occupational History     Not on file.     Social History Main Topics     Smoking status: Former Smoker     Packs/day: 0.50     Types: Cigarettes     Smokeless tobacco: Never Used      Comment: quit date 10/16/17     Alcohol use 12.0 oz/week     20 Cans of beer per week     Drug use: No     Sexual activity: Not on file     Other Topics Concern     Not on file     Social History Narrative       Family Medical History:  ------------------------------  Family History   Problem Relation Age of Onset     DIABETES Mother          ROS:  REVIEW OF SYSTEMS: (since D/C)    RESP: negative for cough, dyspnea, hemoptysis; POS for occ wheezes  CV: negative for chest pain, palpitations, PND, VITAL, orthopnea  GI: negative for dysphagia, N/V, pain, melena, diarrhea and constipation  NEURO: negative for numbness/tingling, paralysis, incoordination, or focal weakness     OBJECTIVE:                                                    /72  Pulse 71  Temp 97.6  F (36.4  C) (Oral)  Wt 196 lb (88.9 kg)  SpO2 95%  BMI 30.7 kg/m2     GENERAL alert and no distress  EYES:  Normal sclera,conjunctiva, EOMI  HENT: oral and posterior pharynx without lesions or erythema, facies symmetric  NECK: Neck supple. No LAD, without thyroidmegaly or JVD., Carotids without bruits.  RESP: No focal rales, scattered faint mild end expiraotry wheezes with forced expiration   CV: RRR normal S1S2 without murmurs, rubs or gallops. PMI normal  LYMPH: no  cervical lymph adenopathy appreciated  MS: extremities- no gross deformities of the visible extremities noted, no edema  PSYCH: Alert and oriented times 3; speech- coherent  SKIN:  No obvious significant skin lesions on visible portions of face  EXT:           ASSESSMENT/PLAN:                                                      (I25.10) Coronary artery disease involving native coronary artery of native heart without angina pectoris  (primary encounter diagnosis)  Comment: This condition is currently controlled on the current medical regimen.  Continue current therapy.   The patient does not report any signs or symptoms of angina or active cardiac ischemia.   They do not report any relative changes in their ability to perform physical activities over the past year.    We discussed aggressive secondary risk factor modification, including aggressive BP control (under 130/ideally), aggressive LDL lowering with statin (goal under 100 for sure/under 70 ideally), no smoking, diabetes prevention/management, no smoking, and use of either ASA or similar anti platelet agent if tolerated.     Plan: atorvastatin (LIPITOR) 40 MG tablet, lisinopril        (PRINIVIL/ZESTRIL) 20 MG tablet, CBC with         platelets differential, Lipid panel reflex to         direct LDL Fasting, Comprehensive metabolic         panel, aspirin 81 MG tablet            (I50.9) Congestive heart failure, unspecified congestive heart failure chronicity, unspecified congestive heart failure type (H)  Comment: This condition is currently controlled on the current medical regimen.  Continue current therapy.   No CHF at this time.   Plan:     (I10) Benign essential hypertension  Comment: This condition is currently controlled on the current medical regimen.  Continue current therapy.   Discussed hypertension in detail including JNC VIII guidelines for blood pressure goals.  Discussed indication for treatment and treatment options.  Discussed the importance  for aggressive management of HTN to prevent vascular complications later.  Recommended lower fat, lower carbohydrate, and lower sodium (<2000 mg)diet.  Discussed required intervals for follow up on HTN, lab studies, and the need to aggresive management of other cardiac disease risk factors.  Recommened pt. follow their blood pressures outside the clinic to ensure that BPs are remaining within guidelines, and to contact me if the readings are not within guidelines so we can adjust treatment as needed.   Plan: lisinopril (PRINIVIL/ZESTRIL) 20 MG tablet, CBC        with platelets differential, Comprehensive         metabolic panel            (A41.9) Sepsis, due to unspecified organism (H)  Comment: resolved.   Feeling better.   Plan:     (J18.9) Pneumonia of both lower lobes due to infectious organism  Comment: needs follow up CXR in 6-8 weeks to confirm resolution.   Plan: XR Chest 2 Views            (D50.9) Iron deficiency anemia, unspecified iron deficiency anemia type  Comment: repeat labs. Has history of mild anemia  Has history of alcohol abuse.   Workup as indiacted by labs.   Plan: Iron and iron binding capacity               *  Continue all medications at the same doses.  Contact your usual pharmacy if you need refills.     *  Your heart failure appears to be better.     *  Follow up with the Cardiology Clinic as planned tomorrow and again in November.     *  The cardiac pumping was worse than 2016 when it was checked during this past hospital stay.  This is probably due to alcohol and not taking your medications.   Hopefully the heart pumping will improve with staying away from alcohol and taking your medications.  If your heart pumping does not improve, then you will need further heart testing.     *  OK to stop checking the glucose readings at home, you do not have diabetes at this time.  Keep the testing materials in case we ever have to have you do this in the future.      *  Conitnue to stop smoking.      *  NO MORE ALCOHOL!!!    *  Return to see either me or the [facility] (which will be closer to your new home in [place]) in early December regardless of how you feel, return sooner if needed.        See Patient Instructions    RAFI ROSA M.D., MD  [facility]

## 2017-10-31 NOTE — MR AVS SNAPSHOT
After Visit Summary   10/31/2017    Alfa Cm    MRN: [id number]           Patient Information     Date Of Birth          1960        Visit Information        Provider Department      10/31/2017 11:40 AM Tulio Arango MD [facility]        Today's Diagnoses     Coronary artery disease involving native coronary artery of native heart without angina pectoris    -  1    Congestive heart failure, unspecified congestive heart failure chronicity, unspecified congestive heart failure type (H)        Benign essential hypertension        Sepsis, due to unspecified organism (H)        Pneumonia of both lower lobes due to infectious organism        Iron deficiency anemia, unspecified iron deficiency anemia type          Care Instructions    *  Continue all medications at the same doses.  Contact your usual pharmacy if you need refills.     *  Your heart failure appears to be better.     *  Follow up with the Cardiology Clinic as planned tomorrow and again in November.     *  The cardiac pumping was worse than 2016 when it was checked during this past hospital stay.  This is probably due to alcohol and not taking your medications.   Hopefully the heart pumping will improve with staying away from alcohol and taking your medications.  If your heart pumping does not improve, then you will need further heart testing.     *  OK to stop checking the glucose readings at home, you do not have diabetes at this time.  Keep the testing materials in case we ever have to have you do this in the future.      *  Conitnue to stop smoking.     *  NO MORE ALCOHOL!!!    *  Return to see either me or the [facility] (which will be closer to your new home in [place]) in early December regardless of how you feel, return sooner if needed.      5 GOALS TO PREVENT VASCULAR DISEASE:     1.  Aggressive blood pressure control, under 130/80 ideally.  Using medications if  "needed.    Your blood pressure is under good control    BP Readings from Last 4 Encounters:   10/31/17 102/72   10/18/17 129/78   10/02/16 94/65   09/07/13 (!) 152/104       2.  Aggressive LDL cholesterol (\"bad cholesterol\") lowering as indicated.    Your goal is an LDL under 130 for sure, preferably under 100.  (If you have diabetes or previous vascular disease, the the LDL goals would be under 100 for sure, preferably under 70.)    New guidelines identify four high-risk groups who could benefit from statins:   *people with pre-existing heart disease, such as those who have had a heart attack;   *people ages 40 to 75 who have diabetes of any type  *patients ages 40 to 75 with at least a 7.5% risk of developing cardiovascular disease over the next decade, according to a formula described in the guidelines  *patients with the sort of super-high cholesterol that sometimes runs in families, as evidenced by an LDL of 190 milligrams per deciliter or higher    * Take the Atorvastatin 40 mg once per day, every day.      3.  Aggressive diabetic prevention, screening and/or management.      You do not have diabetes as of the most recent blood tests.     4.  No smoking    5.  Consider taking low dose aspirin (81 mg) tablet once per day over the age of 50, every day unless there is a specific reason that you cannot take aspirin (such as side effect, allergy, or you are on another \"blood thinner\").        --Based on your current cardiac risk factors, you should take Aspirin 81 mg once per day if you are over 50 years of age.              --Good Grains:  Oats, brown rice, Quinoa (these do not raise the blood sugar as much)     --Bad grains:  Anything made from wheat or white rice     (because these raise the blood sugars significantly, and the possible gluten issue from wheat for some people).      --Proteins:  Aim for \"lean proteins\" including chicken, fish, seafood, pork, turkey, and eggs (in moderation); Eat red meat only " occasionally              Follow-ups after your visit        Your next 10 appointments already scheduled     Nov 01, 2017  1:45 PM CDT   LAB with RU LAB   [facility] [place] ([facility])    [address] [place] [address]  [facility] [id number] [phone]           Please do not eat 10-12 hours before your appointment if you are coming in fasting for labs on lipids, cholesterol, or glucose (sugar). This does not apply to pregnant women. Water, hot tea and black coffee (with nothing added) are okay. Do not drink other fluids, diet soda or chew gum.            Nov 01, 2017  2:30 PM CDT   Return Discharge with MATTIE Darnell CNP   [facility] ([facility])    [id number] [place] [address]  [facility] [id number] [phone]            Nov 20, 2017  8:45 AM CST   Return Visit with Alvarado Carson MD   [facility] ([facility])    [address] [place] [address]  [facility] [id number] [phone]              Future tests that were ordered for you today     Open Future Orders        Priority Expected Expires Ordered    XR Chest 2 Views Routine 11/30/2017 1/31/2018 10/31/2017            Who to contact     If you have questions or need follow up information about today's clinic visit or your schedule please contact [facility] directly at [phone].  Normal or non-critical lab and imaging results will be communicated to you by MyChart, letter or phone within 4 business days after the clinic has received the results. If you do not hear from us within 7 days, please contact the clinic through MyChart or phone. If you have a critical or abnormal lab result, we will notify you by phone as soon as possible.  Submit refill requests through [employer] or call your pharmacy and they will forward the refill request to us. Please allow 3 business days for  "your refill to be completed.          Additional Information About Your Visit        [employer] Information     [employer] lets you send messages to your doctor, view your test results, renew your prescriptions, schedule appointments and more. To sign up, go to [email or web address] . Click on \"Log in\" on the left side of the screen, which will take you to the Welcome page. Then click on \"Sign up Now\" on the right side of the page.     You will be asked to enter the access code listed below, as well as some personal information. Please follow the directions to create your username and password.     Your access code is: [email or web address]  Expires: 2018  1:55 PM     Your access code will  in 90 days. If you need help or a new code, please call your [facility] clinic or [phone].        Care EveryWhere ID     This is your Care EveryWhere ID. This could be used by other organizations to access your [facility] medical records  [id number]        Your Vitals Were     Pulse Temperature Pulse Oximetry BMI (Body Mass Index)          71 97.6  F (36.4  C) (Oral) 95% 30.7 kg/m2         Blood Pressure from Last 3 Encounters:   10/31/17 102/72   10/18/17 129/78   10/02/16 94/65    Weight from Last 3 Encounters:   10/31/17 196 lb (88.9 kg)   10/18/17 191 lb 5.8 oz (86.8 kg)   16 197 lb (89.4 kg)              We Performed the Following     CBC with platelets differential     Comprehensive metabolic panel     Iron and iron binding capacity     Lipid panel reflex to direct LDL Fasting          Today's Medication Changes          These changes are accurate as of: 10/31/17 12:57 PM.  If you have any questions, ask your nurse or doctor.               These medicines have changed or have updated prescriptions.        Dose/Directions    atorvastatin 40 MG tablet   Commonly known as:  LIPITOR   This may have changed:  medication strength   Used for:  Coronary artery disease involving native coronary artery of native heart without " angina pectoris   Changed by:  Tulio Arango MD        Dose:  40 mg   Take 1 tablet (40 mg) by mouth daily   Refills:  0                Primary Care Provider    None Specified       No primary provider on file.        Equal Access to Services     SHIKHA MIRANDA : Kristie harden wil [place], melvin gaines, katy gould, ismael sin nettaalayna carrasquillo laDarynben . So [facility] [phone].    ATENCIÓN: Si habla español, tiene a rao disposición servicios gratuitos de asistencia lingüística. Llame al [phone].    We comply with applicable federal civil rights laws and [place] laws. We do not discriminate on the basis of race, color, national origin, age, disability, sex, sexual orientation, or gender identity.            Thank you!     Thank you for choosing [facility]  for your care. Our goal is always to provide you with excellent care. Hearing back from our patients is one way we can continue to improve our services. Please take a few minutes to complete the written survey that you may receive in the mail after your visit with us. Thank you!             Your Updated Medication List - Protect others around you: Learn how to safely use, store and throw away your medicines at www.disposemymeds.org.          This list is accurate as of: 10/31/17 12:57 PM.  Always use your most recent med list.                   Brand Name Dispense Instructions for use Diagnosis    * albuterol 108 (90 BASE) MCG/ACT Inhaler    PROAIR HFA/PROVENTIL HFA/VENTOLIN HFA     Inhale 1-2 puffs into the lungs every 6 hours as needed for shortness of breath / dyspnea or wheezing        * albuterol (2.5 MG/3ML) 0.083% neb solution     360 mL    Take 1 vial (2.5 mg) by nebulization 4 times daily    Community acquired pneumonia of right lower lobe of lung (H)       alcohol swab prep pads     100 each    Use to swab area of injection/kylee as directed.    Hyperglycemia       ALLEGRA ALLERGY PO            * ASPIRIN ADULT LOW STRENGTH PO      Take 81 mg by mouth daily        * aspirin 81 MG tablet      Take 1 tablet (81 mg) by mouth daily    Coronary artery disease involving native coronary artery of native heart without angina pectoris       atorvastatin 40 MG tablet    LIPITOR     Take 1 tablet (40 mg) by mouth daily    Coronary artery disease involving native coronary artery of native heart without angina pectoris       blood glucose calibration solution    NO BRAND SPECIFIED    1 Bottle    Use to calibrate blood glucose monitor as needed as directed.    Hyperglycemia       blood glucose monitoring meter device kit    no brand specified    1 kit    Use to test blood sugar as directed.    Hyperglycemia       blood glucose monitoring test strip    no brand specified    100 strip    Use to test blood sugar 4 times daily or as directed.    Hyperglycemia       carvedilol 6.25 MG tablet    COREG    60 tablet    Take 1 tablet (6.25 mg) by mouth 2 times daily (with meals)    Congestive heart failure, unspecified congestive heart failure chronicity, unspecified congestive heart failure type (H)       ferrous sulfate 325 (65 FE) MG tablet    IRON     Take 325 mg by mouth daily (with breakfast)        furosemide 40 MG tablet    LASIX    60 tablet    Take 1 tablet (40 mg) by mouth 2 times daily    Congestive heart failure, unspecified congestive heart failure chronicity, unspecified congestive heart failure type (H)       lisinopril 20 MG tablet    PRINIVIL/ZESTRIL     Take 1 tablet (20 mg) by mouth daily    Coronary artery disease involving native coronary artery of native heart without angina pectoris, Benign essential hypertension       multivitamin, therapeutic Tabs tablet      Take 1 tablet by mouth daily        order for DME     1 Device    Equipment being ordered: Other: Nebulizer machine Treatment Diagnosis: Pneumonia, reactive airway    Community acquired pneumonia of right lower lobe of lung (H)       pantoprazole 40  MG EC tablet    PROTONIX    30 tablet    Take 1 tablet (40 mg) by mouth every morning    Gastroesophageal reflux disease, esophagitis presence not specified       thin lancets    NO BRAND SPECIFIED    100 each    Use with lanceting device.    Hyperglycemia       * Notice:  This list has 4 medication(s) that are the same as other medications prescribed for you. Read the directions carefully, and ask your doctor or other care provider to review them with you.

## 2017-10-31 NOTE — LETTER
November 2, 2017      Alfa Shahzad Cm  [address] [place] [address]        Dear ,    We are writing to inform you of your test results.    Your test results fall within the expected range(s) or remain unchanged from previous results.  Please continue with current treatment plan.    Your labs look good, the blood count ( hemoglobin) continues to return to normal.    Continue all medications at the same doses.  Contact your usual pharmacy if you need refills.     Resulted Orders   CBC with platelets differential   Result Value Ref Range    WBC 7.9 4.0 - 11.0 10e9/L    RBC Count 4.34 (L) 4.4 - 5.9 10e12/L    Hemoglobin 13.1 (L)  **improved from the 11-12 range 2 weeks ago 13.3 - 17.7 g/dL    Hematocrit 40.7 40.0 - 53.0 %    MCV 94 78 - 100 fl    MCH 30.2 26.5 - 33.0 pg    MCHC 32.2 31.5 - 36.5 g/dL    RDW 14.9 10.0 - 15.0 %    Platelet Count 335 150 - 450 10e9/L    Diff Method Automated Method     % Neutrophils 61.2 %    % Lymphocytes 28.4 %    % Monocytes 7.7 %    % Eosinophils 2.4 %    % Basophils 0.3 %    Absolute Neutrophil 4.9 1.6 - 8.3 10e9/L    Absolute Lymphocytes 2.3 0.8 - 5.3 10e9/L    Absolute Monocytes 0.6 0.0 - 1.3 10e9/L    Absolute Eosinophils 0.2 0.0 - 0.7 10e9/L    Absolute Basophils 0.0 0.0 - 0.2 10e9/L   Lipid panel reflex to direct LDL Fasting   Result Value Ref Range    Cholesterol 155 <200 mg/dL    Triglycerides 105 <150 mg/dL      Comment:      Fasting specimen    HDL Cholesterol 47 >39 mg/dL    LDL Cholesterol Calculated 87 <100 mg/dL      Comment:      Desirable:       <100 mg/dl    Non HDL Cholesterol 108 <130 mg/dL   Comprehensive metabolic panel   Result Value Ref Range    Sodium 140 133 - 144 mmol/L    Potassium 4.2 3.4 - 5.3 mmol/L    Chloride 104 94 - 109 mmol/L    Carbon Dioxide 27 20 - 32 mmol/L    Anion Gap 9 3 - 14 mmol/L    Glucose 87 70 - 99 mg/dL      Comment:      Fasting specimen    Urea Nitrogen 7 7 - 30 mg/dL    Creatinine 0.92 0.66 - 1.25 mg/dL     GFR Estimate 85 >60 mL/min/1.7m2      Comment:      Non  GFR Calc    GFR Estimate If Black >90 >60 mL/min/1.7m2      Comment:       GFR Calc    Calcium 9.3 8.5 - 10.1 mg/dL    Bilirubin Total 0.7 0.2 - 1.3 mg/dL    Albumin 3.3 (L) 3.4 - 5.0 g/dL    Protein Total 8.2 6.8 - 8.8 g/dL    Alkaline Phosphatase 143 40 - 150 U/L    ALT 21 0 - 70 U/L    AST 17 0 - 45 U/L   Iron and iron binding capacity   Result Value Ref Range    Iron 83 35 - 180 ug/dL    Iron Binding Cap 313 240 - 430 ug/dL    Iron Saturation Index 27 15 - 46 %       If you have any questions or concerns, please call the clinic at the number listed above.       Sincerely,        Tulio Arango MD

## 2017-11-01 ENCOUNTER — OFFICE VISIT (OUTPATIENT)
Dept: CARDIOLOGY | Facility: CLINIC | Age: 57
End: 2017-11-01
Payer: COMMERCIAL

## 2017-11-01 VITALS
HEIGHT: 67 IN | BODY MASS INDEX: 29.82 KG/M2 | DIASTOLIC BLOOD PRESSURE: 80 MMHG | SYSTOLIC BLOOD PRESSURE: 122 MMHG | WEIGHT: 190 LBS | HEART RATE: 78 BPM

## 2017-11-01 DIAGNOSIS — I50.9 CONGESTIVE HEART FAILURE, UNSPECIFIED CONGESTIVE HEART FAILURE CHRONICITY, UNSPECIFIED CONGESTIVE HEART FAILURE TYPE: ICD-10-CM

## 2017-11-01 LAB
ALBUMIN SERPL-MCNC: 3.3 G/DL (ref 3.4–5)
ALP SERPL-CCNC: 143 U/L (ref 40–150)
ALT SERPL W P-5'-P-CCNC: 21 U/L (ref 0–70)
ANION GAP SERPL CALCULATED.3IONS-SCNC: 9 MMOL/L (ref 3–14)
AST SERPL W P-5'-P-CCNC: 17 U/L (ref 0–45)
BILIRUB SERPL-MCNC: 0.7 MG/DL (ref 0.2–1.3)
BUN SERPL-MCNC: 7 MG/DL (ref 7–30)
CALCIUM SERPL-MCNC: 9.3 MG/DL (ref 8.5–10.1)
CHLORIDE SERPL-SCNC: 104 MMOL/L (ref 94–109)
CHOLEST SERPL-MCNC: 155 MG/DL
CO2 SERPL-SCNC: 27 MMOL/L (ref 20–32)
CREAT SERPL-MCNC: 0.92 MG/DL (ref 0.66–1.25)
GFR SERPL CREATININE-BSD FRML MDRD: 85 ML/MIN/1.7M2
GLUCOSE SERPL-MCNC: 87 MG/DL (ref 70–99)
HDLC SERPL-MCNC: 47 MG/DL
LDLC SERPL CALC-MCNC: 87 MG/DL
NONHDLC SERPL-MCNC: 108 MG/DL
POTASSIUM SERPL-SCNC: 4.2 MMOL/L (ref 3.4–5.3)
PROT SERPL-MCNC: 8.2 G/DL (ref 6.8–8.8)
SODIUM SERPL-SCNC: 140 MMOL/L (ref 133–144)
TRIGL SERPL-MCNC: 105 MG/DL

## 2017-11-01 PROCEDURE — 99214 OFFICE O/P EST MOD 30 MIN: CPT | Performed by: NURSE PRACTITIONER

## 2017-11-01 RX ORDER — CARVEDILOL 6.25 MG/1
6.25 TABLET ORAL 2 TIMES DAILY WITH MEALS
Qty: 180 TABLET | Refills: 3 | Status: SHIPPED | OUTPATIENT
Start: 2017-11-01 | End: 2018-01-10

## 2017-11-01 NOTE — PROGRESS NOTES
HISTORY OF PRESENT ILLNESS:  This 57-year-old male presents to [facility] today for a followup visit.  He is a patient of Dr. Beach and Dr. Carson, seen in our clinic for coronary artery disease, ischemic cardiomyopathy, hypertension, alcohol and tobacco dependence and a remote cerebrovascular accident.      Alfa has a history of remote right coronary artery stenting done at [facility] in 2012.  Details unclear.  He has a longstanding history of tobacco dependence with some underlying chronic obstructive pulmonary disease.  In addition, he has had significant alcohol use on a regular basis.  In the past, he has attended alcohol rehabilitation.        Last year, he suffered a late presenting inferior ST elevated myocardial infarction.  He underwent emergent aspiration thrombectomy to the proximal right coronary artery stent with the clot extending down to the distal right coronary artery.  There was no stenting performed due to the extensive amount of clot and concern for this resulting into acute closure.  There was good flow down the vessel, however, some residual distal filling defect.  Left ventricular ejection fraction at that time was estimated at 45%.  He also had evidence of a complete total occlusion of his diagonal vessel, receiving left-to-left collateral flow.  There has been past issues of noncompliance with taking his medications and continued alcohol use.      Alfa returned to the hospital last month after he experienced a several week history of cough, fever and shortness of breath.  He was noted to be febrile and there was a concern for sepsis due to community-acquired pneumonia.  Chest x-ray did reveal pulmonary infiltrates.  An echocardiogram performed showed a decline in his left ejection fraction to 30%-35% with global hypokinesia and more hypokinetic area in the inferolateral and inferior walls.  He was noted to have dilated IVC, severe right ventricular  "dysfunction and moderate right ventricular enlargement, mild to moderate pulmonary hypertension.  He was diuresed and his weight declined 10 pounds at the time of discharge.  He was placed on appropriate cardiovascular medications including Coreg, lisinopril and Lasix.  He was also started on atorvastatin.  He returns today for reassessment.      Alfa saw his primary medical doctor yesterday.  Laboratory work was performed.  It appears that his a CMP was within normal limits.  Lipid panel did reveal LDL level of 87 and hemoglobin A1c of 5.6.        He tells me he is recovering well from his pneumonia.  His breathing is getting better each day.  He is trying to avoid alcohol as much as possible and has drank very little over the past month.  He has reduced the amount of cigarettes, but has not completely quit.  He has been staying on his medications and taking them faithfully.  He has not noticed any weight fluctuations at home.  Alfa denies any exertional chest pain or shortness of breath.  He does have a continued cough.  He denies orthopnea, peripheral edema.  He has no sensations of palpitations, lightheadedness or dizziness.  He tells me he is actually \"feeling good.\"      PHYSICAL EXAMINATION:  His blood pressure today is 122/80, heart rate is 70 beats per minute and is regular.  I do appreciate an occasional early beat.  His lungs are clear.  There is no peripheral edema.  Weight is stable at 190 pounds.      IMPRESSION AND PLAN:   1.  Coronary artery disease.  History of previous right coronary artery stenting in 2012 and a late presenting inferior ST elevated myocardial infarction in 09/2016, at which time he underwent extensive aspiration thrombectomy, reestablishing good flow.  He has known remaining complete total occluded diagonal vessel receiving collateral flow and 70% disease in a proximal and PDA lesion.  He is free from angina symptoms at this time.   2.  Ischemic cardiomyopathy.  Initial left " ventricular ejection fraction of 45% at the time of his myocardial infarction last year.  Recent hospitalization for pneumonia with associated sepsis did reveal decline in his left ventricular ejection fraction of 30%-35% and severe right ventricular dysfunction and evidence of heart failure.  He has a history of medication and followup noncompliance.  However, for the past month, he has been taking his medications faithfully, including beta blockade, ACE inhibitor therapy and Lasix.  At this time, there are no significant signs and symptoms of heart failure.  His left ventricular ejection fraction has declined from last year to 30%-35%.  I would like to repeat an echocardiogram now that he is more stable.  He already has followup planned with Dr. Carson later this month.  In addition, we will check a 24-hour Holter monitor for dysrhythmia.  He occasionally has a premature heartbeat.  He may be an ICD candidate for primary prevention.  We could also consider ischemic workup; however, he is free from any angina symptoms and doing well at this time.  I have encouraged complete cessation of alcohol and he has made great progress over the past month.   3.  Hypertension.  Blood pressure controlled.   4.  Remote cerebrovascular accident, details unclear.   5.  Tobacco dependence.  I encouraged complete cessation.   6.  Treated hyperlipidemia being managed through his primary medical doctor.  Recent LDL level of 87 on atorvastatin 40 mg.  We could consider increasing this dose.      Thank you for allowing me to participate in this patient's care.         MATTIE SOTO, CNP             D: 2017 15:10   T: 2017 16:49   MT: JH      Name:     XUAN ARTEAGA   MRN:      [id number]        Account:      [id number]   :      1960           Service Date: 2017      Document: [id number]

## 2017-11-01 NOTE — PROGRESS NOTES
HPI and Plan: #[id number]  See dictation    Orders Placed This Encounter   Procedures     Holter Monitor 24 hour - Adult     Echocardiogram       Orders Placed This Encounter   Medications     carvedilol (COREG) 6.25 MG tablet     Sig: Take 1 tablet (6.25 mg) by mouth 2 times daily (with meals)     Dispense:  180 tablet     Refill:  3       Medications Discontinued During This Encounter   Medication Reason     carvedilol (COREG) 6.25 MG tablet Reorder         Encounter Diagnosis   Name Primary?     Congestive heart failure, unspecified congestive heart failure chronicity, unspecified congestive heart failure type (H)        CURRENT MEDICATIONS:  Current Outpatient Prescriptions   Medication Sig Dispense Refill     carvedilol (COREG) 6.25 MG tablet Take 1 tablet (6.25 mg) by mouth 2 times daily (with meals) 180 tablet 3     Fexofenadine HCl (ALLEGRA ALLERGY PO)        atorvastatin (LIPITOR) 40 MG tablet Take 1 tablet (40 mg) by mouth daily       lisinopril (PRINIVIL/ZESTRIL) 20 MG tablet Take 1 tablet (20 mg) by mouth daily       aspirin 81 MG tablet Take 1 tablet (81 mg) by mouth daily       albuterol (2.5 MG/3ML) 0.083% neb solution Take 1 vial (2.5 mg) by nebulization 4 times daily 360 mL 1     furosemide (LASIX) 40 MG tablet Take 1 tablet (40 mg) by mouth 2 times daily 60 tablet 0     pantoprazole (PROTONIX) 40 MG EC tablet Take 1 tablet (40 mg) by mouth every morning 30 tablet 0     order for DME Equipment being ordered: Other: Nebulizer machine  Treatment Diagnosis: Pneumonia, reactive airway 1 Device 0     blood glucose monitoring (NO BRAND SPECIFIED) meter device kit Use to test blood sugar as directed. 1 kit 0     blood glucose monitoring (NO BRAND SPECIFIED) test strip Use to test blood sugar 4 times daily or as directed. 100 strip 6     blood glucose calibration (NO BRAND SPECIFIED) solution Use to calibrate blood glucose monitor as needed as directed. 1 Bottle 3     thin (NO BRAND SPECIFIED) lancets Use with  lanceting device. 100 each 1     alcohol swab prep pads Use to swab area of injection/kylee as directed. 100 each 3     multivitamin, therapeutic (THERA-VIT) TABS tablet Take 1 tablet by mouth daily       ferrous sulfate (IRON) 325 (65 FE) MG tablet Take 325 mg by mouth daily (with breakfast)       ASPIRIN ADULT LOW STRENGTH PO Take 81 mg by mouth daily       albuterol (PROAIR HFA, PROVENTIL HFA, VENTOLIN HFA) 108 (90 BASE) MCG/ACT inhaler Inhale 1-2 puffs into the lungs every 6 hours as needed for shortness of breath / dyspnea or wheezing       [DISCONTINUED] carvedilol (COREG) 6.25 MG tablet Take 1 tablet (6.25 mg) by mouth 2 times daily (with meals) 60 tablet 1       ALLERGIES     Allergies   Allergen Reactions     Pollen Extract        PAST MEDICAL HISTORY:  Past Medical History:   Diagnosis Date     Alcohol abuse      Coronary artery disease involving native coronary artery without angina pectoris 2011    angiplasty, stent placed at [facility]     Hyperlipidemia      Hypertension      Myocardial infarction      Substance abuse      Tobacco abuse        PAST SURGICAL HISTORY:  Past Surgical History:   Procedure Laterality Date     OR PATIENT HAS A CORONARY ARTERY STENT         FAMILY HISTORY:  Family History   Problem Relation Age of Onset     DIABETES Mother        SOCIAL HISTORY:  Social History     Social History     Marital status:      Spouse name: N/A     Number of children: N/A     Years of education: N/A     Social History Main Topics     Smoking status: Former Smoker     Packs/day: 0.50     Types: Cigarettes     Smokeless tobacco: Never Used      Comment: quit date 10/16/17     Alcohol use 12.0 oz/week     20 Cans of beer per week     Drug use: No     Sexual activity: Not Asked     Other Topics Concern     None     Social History Narrative       Review of Systems:  Skin:  Negative for       Eyes:  Negative for      ENT:  Negative for      Respiratory:  Negative for      "  Cardiovascular:  Negative for      Gastroenterology: Negative for      Genitourinary:  not assessed      Musculoskeletal:  Positive for back pain    Neurologic:  Negative for      Psychiatric:  Negative for      Heme/Lymph/Imm:  Negative      Endocrine:  Negative        Physical Exam:  Vitals: /80  Pulse 78  Ht 1.702 m (5' 7\")  Wt 86.2 kg (190 lb)  BMI 29.76 kg/m2    Constitutional:  cooperative;well developed overweight      Skin:  warm and dry to the touch          Head:  normocephalic        Eyes:  pupils equal and round        Lymph:      ENT:  no pallor or cyanosis        Neck:  JVP normal        Respiratory:  clear to auscultation;normal respiratory excursion         Cardiac: regular rhythm;normal S1 and S2 occasional premature beats   no presence of murmur                                                   GI:  abdomen soft        Extremities and Muscular Skeletal:  no edema              Neurological:  affect appropriate        Psych:  Alert and Oriented x 3          CC  No referring provider defined for this encounter.                  "

## 2017-11-01 NOTE — MR AVS SNAPSHOT
After Visit Summary   11/1/2017    Alfa Cm    MRN: [id number]           Patient Information     Date Of Birth          1960        Visit Information        Provider Department      11/1/2017 2:30 PM Radha Arango APRN CNP [facility]        Today's Diagnoses     Congestive heart failure, unspecified congestive heart failure chronicity, unspecified congestive heart failure type (H)           Follow-ups after your visit        Your next 10 appointments already scheduled     Nov 14, 2017  1:00 PM CST   Ech Complete with [id number] ([facility])    [address]  [facility] [address]   [phone]           1. Please bring or wear a comfortable two-piece outfit. 2. You may eat, drink and take your normal medicines. 3. For any questions that cannot be answered, please contact the ordering physician ***Please check-in at the [employer] Registration Office located in [address] in the [facility] building. When you are finished registering, please go to [address] and have a seat. The technician will call your name for the test.            Nov 14, 2017  2:00 PM CST   Holter Monitor with RS[facility] DEVICE [facility] ([facility])    [address]  [facility] [id number] [phone]           LOCATION - [address], [address] [place] [address] **Please check-in at the [employer] Registration Office, [address], in the [facility] building. When you are finished registering, please go to [address] and have a seat.            Nov 20, 2017  8:45 AM CST   Return Visit with Alvarado Carson MD   [facility] ([facility])    [address]  [facility] [id number] [phone]              Future tests that were ordered for  "you today     Open Future Orders        Priority Expected Expires Ordered    Echocardiogram Routine 2017    Holter Monitor 24 hour - Adult Routine 2017            Who to contact     If you have questions or need follow up information about today's clinic visit or your schedule please contact [facility] directly at [phone].  Normal or non-critical lab and imaging results will be communicated to you by 24tidyhart, letter or phone within 4 business days after the clinic has received the results. If you do not hear from us within 7 days, please contact the clinic through R&Vt or phone. If you have a critical or abnormal lab result, we will notify you by phone as soon as possible.  Submit refill requests through NodePing or call your pharmacy and they will forward the refill request to us. Please allow 3 business days for your refill to be completed.          Additional Information About Your Visit        NodePing Information     NodePing lets you send messages to your doctor, view your test results, renew your prescriptions, schedule appointments and more. To sign up, go to [email or web address] . Click on \"Log in\" on the left side of the screen, which will take you to the Welcome page. Then click on \"Sign up Now\" on the right side of the page.     You will be asked to enter the access code listed below, as well as some personal information. Please follow the directions to create your username and password.     Your access code is: [email or web address]  Expires: 2018  1:55 PM     Your access code will  in 90 days. If you need help or a new code, please call your [place] clinic or [phone].        Care EveryWhere ID     This is your Care EveryWhere ID. This could be used by other organizations to access your [place] medical records  [id number]        Your Vitals Were     Pulse Height BMI (Body Mass Index)       " "      78 1.702 m (5' 7\") 29.76 kg/m2          Blood Pressure from Last 3 Encounters:   11/01/17 122/80   10/31/17 102/72   10/18/17 129/78    Weight from Last 3 Encounters:   11/01/17 86.2 kg (190 lb)   10/31/17 88.9 kg (196 lb)   10/18/17 86.8 kg (191 lb 5.8 oz)                 Where to get your medicines      These medications were sent to [id number] - [address] AT [facility] [address] [facility] [address]     Phone:  [phone]     carvedilol 6.25 MG tablet          Primary Care Provider    Physician No Ref-Primary       NO REF-PRIMARY PHYSICIAN        Equal Access to Services     SHIKHA MIRANDA : Kristie de la torre Solisa, waaxda luqadaha, qaybta kaalmada bryanna, ismael lee. So [facility] [phone].    ATENCIÓN: Si habla español, tiene a rao disposición servicios gratuitos de asistencia lingüística. García al [phone].    We comply with applicable federal civil rights laws and [place] laws. We do not discriminate on the basis of race, color, national origin, age, disability, sex, sexual orientation, or gender identity.            Thank you!     Thank you for choosing [facility]  for your care. Our goal is always to provide you with excellent care. Hearing back from our patients is one way we can continue to improve our services. Please take a few minutes to complete the written survey that you may receive in the mail after your visit with us. Thank you!             Your Updated Medication List - Protect others around you: Learn how to safely use, store and throw away your medicines at www.disposemymeds.org.          This list is accurate as of: 11/1/17  2:56 PM.  Always use your most recent med list.                   Brand Name Dispense Instructions for use Diagnosis    * albuterol 108 (90 BASE) MCG/ACT Inhaler    PROAIR HFA/PROVENTIL HFA/VENTOLIN HFA     " Inhale 1-2 puffs into the lungs every 6 hours as needed for shortness of breath / dyspnea or wheezing        * albuterol (2.5 MG/3ML) 0.083% neb solution     360 mL    Take 1 vial (2.5 mg) by nebulization 4 times daily    Community acquired pneumonia of right lower lobe of lung (H)       alcohol swab prep pads     100 each    Use to swab area of injection/kylee as directed.    Hyperglycemia       ALLEGRA ALLERGY PO           * ASPIRIN ADULT LOW STRENGTH PO      Take 81 mg by mouth daily        * aspirin 81 MG tablet      Take 1 tablet (81 mg) by mouth daily    Coronary artery disease involving native coronary artery of native heart without angina pectoris       atorvastatin 40 MG tablet    LIPITOR     Take 1 tablet (40 mg) by mouth daily    Coronary artery disease involving native coronary artery of native heart without angina pectoris       blood glucose calibration solution    NO BRAND SPECIFIED    1 Bottle    Use to calibrate blood glucose monitor as needed as directed.    Hyperglycemia       blood glucose monitoring meter device kit    no brand specified    1 kit    Use to test blood sugar as directed.    Hyperglycemia       blood glucose monitoring test strip    no brand specified    100 strip    Use to test blood sugar 4 times daily or as directed.    Hyperglycemia       carvedilol 6.25 MG tablet    COREG    180 tablet    Take 1 tablet (6.25 mg) by mouth 2 times daily (with meals)    Congestive heart failure, unspecified congestive heart failure chronicity, unspecified congestive heart failure type (H)       ferrous sulfate 325 (65 FE) MG tablet    IRON     Take 325 mg by mouth daily (with breakfast)        furosemide 40 MG tablet    LASIX    60 tablet    Take 1 tablet (40 mg) by mouth 2 times daily    Congestive heart failure, unspecified congestive heart failure chronicity, unspecified congestive heart failure type (H)       lisinopril 20 MG tablet    PRINIVIL/ZESTRIL     Take 1 tablet (20 mg) by mouth daily     Coronary artery disease involving native coronary artery of native heart without angina pectoris, Benign essential hypertension       multivitamin, therapeutic Tabs tablet      Take 1 tablet by mouth daily        order for DME     1 Device    Equipment being ordered: Other: Nebulizer machine Treatment Diagnosis: Pneumonia, reactive airway    Community acquired pneumonia of right lower lobe of lung (H)       pantoprazole 40 MG EC tablet    PROTONIX    30 tablet    Take 1 tablet (40 mg) by mouth every morning    Gastroesophageal reflux disease, esophagitis presence not specified       thin lancets    NO BRAND SPECIFIED    100 each    Use with lanceting device.    Hyperglycemia       * Notice:  This list has 4 medication(s) that are the same as other medications prescribed for you. Read the directions carefully, and ask your doctor or other care provider to review them with you.

## 2017-11-09 ENCOUNTER — CARE COORDINATION (OUTPATIENT)
Dept: CARE COORDINATION | Facility: CLINIC | Age: 57
End: 2017-11-09

## 2017-11-09 NOTE — PROGRESS NOTES
"Clinic Care Coordination Contact  OUTREACH    Referral Information:  Referral Source: CTS  Reason for Contact---Hospitalization 10/9-10/18/2017--Pneumonia /Heart Failure   Care Conference: No     Universal Utilization:   ED Visits in last year: 0  Hospital visits in last year: 2  Last PCP appointment:  (NA)  Missed Appointments:  (NA)  Concerns: NO  Multiple Providers or Specialists: PCP-Cardiology     Clinical Concerns:--Patient uses oxygen at 3 liters only at night   Denies any wheezing and feels his pneumonia is gone because his energy is back     Clinical Pathway Name: Heart Failure  Clinical Pathway: Clinic Care Coordination Heart Failure Follow Up Assessment:  Last contacted date:  10/24/2017  Home scale available:  Yes  Home scale weight this mornin stable weight no concerns   Following a low sodium diet:    Yes      Heart Failure Zones sheet on refrigerator or available: Yes  What Heart Failure Zone currently in:Green  Any increased SOB since last contacted :No  Any increased edema since last contacted :No  Is your activity more limited since last contact:No  Have you had any chest pain since last contact:No  What number to call for YELLOW zones: [phone]   Medications:   \"How many of your current medicines changed (dose, timing, name, etc.)  Since last contacted ?\" 0 - 1  \"Do you have questions about your medications?\"No  When is your next appointment with the CHF clinic: 2017    Medication Management:  Not discussed     Functional Status:  Mobility Status:  (NA)  Equipment Currently Used at Home: none       Psychosocial:  Current living arrangement:: I live in a private home with spouse     Resources and Interventions:  Current Resources:  (NA);  (NA)  PAS Number:  (NA)  Senior Linkage Line Referral Placed:  (NA)  Advanced Care Plans/Directives on file:: No  Referrals Placed:  (NA)     Goals:   Goal 1 Statement: I will follow the CHF action plan   Goal 1 Progression Percent: 50%  Goal 1 " Progression Date: 11/09/17         Barriers: No barriers identified today   Strengths: Wife supportive   Patient/Caregiver understanding: Patient understands to seek medical  Help if experiencing increased symptoms     Frequency of Care Coordination: 2-4 weeks        Plan: CC will follow up in 2-4 weeks     Kathrine Beatty RN / Clinical Care Coordinator     [address] [address]  [email or web address]  Office :  [phone] / Fax :  [phone]

## 2017-11-15 ENCOUNTER — PRE VISIT (OUTPATIENT)
Dept: CARDIOLOGY | Facility: CLINIC | Age: 57
End: 2017-11-15

## 2017-11-22 ENCOUNTER — TELEPHONE (OUTPATIENT)
Dept: CARDIOLOGY | Facility: CLINIC | Age: 57
End: 2017-11-22

## 2017-11-22 ENCOUNTER — TELEPHONE (OUTPATIENT)
Dept: INTERNAL MEDICINE | Facility: CLINIC | Age: 57
End: 2017-11-22

## 2017-11-22 DIAGNOSIS — A41.9 SEPSIS, DUE TO UNSPECIFIED ORGANISM: ICD-10-CM

## 2017-11-22 DIAGNOSIS — I25.10 CORONARY ARTERY DISEASE INVOLVING NATIVE CORONARY ARTERY OF NATIVE HEART WITHOUT ANGINA PECTORIS: Primary | ICD-10-CM

## 2017-11-22 NOTE — TELEPHONE ENCOUNTER
OK to discontinue oxygen.  We need to know what company to contact to give the order.   The name of the company is usually written on the side of the tank or compressor.    Please call the number listed below to get their fax number or have them send me a fax to send to them to D/C the oxygen.

## 2017-11-22 NOTE — TELEPHONE ENCOUNTER
Received a call from patient. Patient was recently in the hospital and he was sent home with oxygen. Patient is requesting orders? He needs a new tank? I reviewed with patient that we do not order or manage oxygen. I recommended patient to call his PMD to address. Patient had no questions.     Alayna JOSHI  [facility]

## 2017-11-22 NOTE — TELEPHONE ENCOUNTER
Reason for call:  Order   Order or referral being requested: DC O2  Reason for request:   Date needed: as soon as possible  Has the patient been seen by the PCP for this problem? YES    Additional comments: Please call [phone] to discontinue oxygen so they can get the oxygen tank picked up.      Phone number to reach patient:  Home number on file [phone] (home)    Best Time:  anytime    Can we leave a detailed message on this number?  YES

## 2017-11-22 NOTE — TELEPHONE ENCOUNTER
Spoke to pt. States he hasn't been using the O2 for about 1 week and feels he no longer needs it. He requests we call the O2 company (He doesn't know the name) at the number below to D/C the O2 and then he'll call them to arrange a  date and time.  CHANDAN Leal, SHELBY

## 2017-11-24 NOTE — TELEPHONE ENCOUNTER
Called number below. [employer] needs an actual written D/C order (MD message below would not suffice). Please place order and fax to Aich[employer] at [phone]. Order pended.

## 2017-11-24 NOTE — TELEPHONE ENCOUNTER
Order written and faxed to [employer] at the number listed below.     They will hop[efully contact him to remove the equipment.

## 2017-12-08 ENCOUNTER — CARE COORDINATION (OUTPATIENT)
Dept: CARE COORDINATION | Facility: CLINIC | Age: 57
End: 2017-12-08

## 2017-12-08 NOTE — PROGRESS NOTES
Clinic Care Coordination Contact  Referral Information:  Referral Source: CTS  Reason for Contact---Hospitalization 10/9-10/18/2017--Pneumonia /Heart Failure     Patient is transferring his primary care to the [facility] closer to his home.  Patient states he is feeling good and no episodes of breathing difficulty or increased SOB    No unmet needs, No further care coordination needed at this time       Kathrine Beatty RN / Clinical Care Coordinator     [address] [address]  [email or web address]  Office :  [phone] / Fax :  [phone]

## 2017-12-08 NOTE — LETTER
[facility]  Complex Care Plan  About Me  Patient Name:  Alfa Cm    YOB: 1960  Age:   57 year old   [facility] MRN: [id number] Telephone Information:     Home Phone [phone]   Mobile [phone]       Address:    [address] [place] [address] Email address:  [email or web address]      Emergency Contact(s)  Name Relationship Lgl Grd Work Phone Home Phone Mobile Phone   AIMEE HAMPTON Spouse  [phone]           Primary language:  English     needed? No   [facility] [employer]:  [phone] op. 1  Other communication barriers: No  Preferred Method of Communication:  Letter, Phone  Current living arrangement: I live in a private home with spouse  Mobility Status/ Medical Equipment:  (NA)  Other information to know about me:    Health Maintenance  Health Maintenance Reviewed: Not assessed    My Access Plan  Medical Emergency 911   Primary Clinic Line [facility]- [phone]   24 Hour Appointment Line [phone] or  [phone]) (toll-free)   24 Hour Nurse Line [phone] (toll-free)   Preferred Urgent Care [facility], [phone]   Preferred Hospital [facility]  [phone]   Preferred Pharmacy [facility] [address] [place] [address] AVE [address] AT [address]     Behavioral Health Crisis Line The National Suicide Prevention Lifeline at 1-412.759.2187 or 911     My Care Team Members  Patient Care Team       Relationship Specialty Notifications Start End    No Ref-Primary, Physician PCP - General   11/1/17     Fax: [phone]         Kathrine Beatty, RN Clinic Care Coordinator  Admissions 10/19/17     Phone: [phone] Fax: [phone]             My Care Plans  Self Management and Treatment Plan  Goals and (Comments)  Goal #1: I will follow the CHF action plan     -I will seek medical  help if experiencing increased symptoms of concern  100% of goal reached      Action Plans on File: CHF  Advance Care Plans/Directives Type:   Type Advanced Care Plans/Directives:  (NA)    My Medical and Care Information  Problem List   Patient Active Problem List   Diagnosis     ST elevation myocardial infarction involving right coronary artery (H)     Hyperlipidemia     Benign essential hypertension     Coronary artery disease involving native coronary artery     Tobacco dependence syndrome     STEMI (ST elevation myocardial infarction) (H)     Sepsis (H)     Coronary artery disease involving native coronary artery without angina pectoris     Iron deficiency anemia, unspecified iron deficiency anemia type      Current Medications and Allergies:  See printed Medication Report.    Care Coordination Start Date: 10/24/17   Frequency of Care Coordination: Closed to CC 12/8/2017   Form Last Updated: 12/08/2017

## 2017-12-12 ENCOUNTER — OFFICE VISIT (OUTPATIENT)
Dept: FAMILY MEDICINE | Facility: CLINIC | Age: 57
End: 2017-12-12
Payer: COMMERCIAL

## 2017-12-12 VITALS
RESPIRATION RATE: 18 BRPM | OXYGEN SATURATION: 96 % | SYSTOLIC BLOOD PRESSURE: 102 MMHG | HEART RATE: 80 BPM | DIASTOLIC BLOOD PRESSURE: 70 MMHG | TEMPERATURE: 98 F | BODY MASS INDEX: 30.1 KG/M2 | WEIGHT: 192.2 LBS

## 2017-12-12 DIAGNOSIS — I10 BENIGN ESSENTIAL HYPERTENSION: ICD-10-CM

## 2017-12-12 DIAGNOSIS — Z72.0 TOBACCO ABUSE: ICD-10-CM

## 2017-12-12 DIAGNOSIS — I21.11 ST ELEVATION MYOCARDIAL INFARCTION INVOLVING RIGHT CORONARY ARTERY (H): ICD-10-CM

## 2017-12-12 DIAGNOSIS — M25.562 LEFT KNEE PAIN, UNSPECIFIED CHRONICITY: Primary | ICD-10-CM

## 2017-12-12 PROCEDURE — 99214 OFFICE O/P EST MOD 30 MIN: CPT | Performed by: NURSE PRACTITIONER

## 2017-12-12 NOTE — MR AVS SNAPSHOT
After Visit Summary   12/12/2017    Alfa Cm    MRN: [id number]           Patient Information     Date Of Birth          1960        Visit Information        Provider Department      12/12/2017 11:00 AM Lida Walter APRN CNP [facility]        Today's Diagnoses     Left knee pain, unspecified chronicity    -  1    ST elevation myocardial infarction involving right coronary artery (H)        Benign essential hypertension        Tobacco abuse           Follow-ups after your visit        Additional Services     ORTHO  REFERRAL       [facility] is referring you to the Orthopedic  Services at [facility].       The  Representative will assist you in the coordination of your Orthopedic and Musculoskeletal Care as prescribed by your physician.    The  Representative will call you within 1 business day to help schedule your appointment, or you may contact the  Representative at:    All areas ~ [phone]     Type of Referral : Non Surgical       Timeframe requested: 3 - 5 days    Coverage of these services is subject to the terms and limitations of your health insurance plan.  Please call member services at your health plan with any benefit or coverage questions.      If X-rays, CT or MRI's have been performed, please contact the facility where they were done to arrange for , prior to your scheduled appointment.  Please bring this referral request to your appointment and present it to your specialist.                  Who to contact     If you have questions or need follow up information about today's clinic visit or your schedule please contact [facility] directly at [phone].  Normal or non-critical lab and imaging results will be communicated to you by MyChart, letter or phone within 4 business days after the clinic has received the results. If  "you do not hear from us within 7 days, please contact the clinic through [employer] or phone. If you have a critical or abnormal lab result, we will notify you by phone as soon as possible.  Submit refill requests through [employer] or call your pharmacy and they will forward the refill request to us. Please allow 3 business days for your refill to be completed.          Additional Information About Your Visit        [employer] Information     [employer] lets you send messages to your doctor, view your test results, renew your prescriptions, schedule appointments and more. To sign up, go to [email or web address] . Click on \"Log in\" on the left side of the screen, which will take you to the Welcome page. Then click on \"Sign up Now\" on the right side of the page.     You will be asked to enter the access code listed below, as well as some personal information. Please follow the directions to create your username and password.     Your access code is: [email or web address]  Expires: 2018 12:55 PM     Your access code will  in 90 days. If you need help or a new code, please call your [place] clinic or [phone].        Care EveryWhere ID     This is your Care EveryWhere ID. This could be used by other organizations to access your [place] medical records  [id number]        Your Vitals Were     Pulse Temperature Respirations Pulse Oximetry BMI (Body Mass Index)       80 98  F (36.7  C) (Oral) 18 96% 30.1 kg/m2        Blood Pressure from Last 3 Encounters:   17 102/70   17 122/80   10/31/17 102/72    Weight from Last 3 Encounters:   17 192 lb 3.2 oz (87.2 kg)   17 190 lb (86.2 kg)   10/31/17 196 lb (88.9 kg)              We Performed the Following     ORTHO  REFERRAL          Today's Medication Changes          These changes are accurate as of: 17 11:23 AM.  If you have any questions, ask your nurse or doctor.               Start taking these medicines.        Dose/Directions    " nicotine polacrilex 2 MG gum   Commonly known as:  [employer] NICOTINE POLACRILEX   Used for:  Tobacco abuse   Started by:  Lida Walter APRN CNP        Dose:  2 mg   Place 1 each (2 mg) inside cheek as needed for smoking cessation   Quantity:  100 tablet   Refills:  1            Where to get your medicines      These medications were sent to [facility] - [place], [place] - [address]  [address], [place] [address]     Phone:  [phone]     nicotine polacrilex 2 MG gum                Primary Care Provider Fax #    Physician No Ref-Primary [phone]       No address on file        Equal Access to Services     ANA MARIA MIRANDA : Hadii jeffrey Eldridge, waaxda luasaeladaha, qaybta kaalmada bryanna, ismael lee. So [facility] [phone].    ATENCIÓN: Si habla español, tiene a rao disposición servicios gratuitos de asistencia lingüística. [facility] [phone].    We comply with applicable federal civil rights laws and [place] laws. We do not discriminate on the basis of race, color, national origin, age, disability, sex, sexual orientation, or gender identity.            Thank you!     Thank you for choosing [facility]  for your care. Our goal is always to provide you with excellent care. Hearing back from our patients is one way we can continue to improve our services. Please take a few minutes to complete the written survey that you may receive in the mail after your visit with us. Thank you!             Your Updated Medication List - Protect others around you: Learn how to safely use, store and throw away your medicines at www.disposemymeds.org.          This list is accurate as of: 12/12/17 11:23 AM.  Always use your most recent med list.                   Brand Name Dispense Instructions for use Diagnosis    * albuterol 108 (90 BASE) MCG/ACT Inhaler    PROAIR HFA/PROVENTIL HFA/VENTOLIN HFA     Inhale 1-2 puffs into the lungs every 6  hours as needed for shortness of breath / dyspnea or wheezing        * albuterol (2.5 MG/3ML) 0.083% neb solution     360 mL    Take 1 vial (2.5 mg) by nebulization 4 times daily    Community acquired pneumonia of right lower lobe of lung (H)       alcohol swab prep pads     100 each    Use to swab area of injection/kylee as directed.    Hyperglycemia       ALLEGRA ALLERGY PO           * ASPIRIN ADULT LOW STRENGTH PO      Take 81 mg by mouth daily        * aspirin 81 MG tablet      Take 1 tablet (81 mg) by mouth daily    Coronary artery disease involving native coronary artery of native heart without angina pectoris       atorvastatin 40 MG tablet    LIPITOR     Take 1 tablet (40 mg) by mouth daily    Coronary artery disease involving native coronary artery of native heart without angina pectoris       blood glucose calibration solution    NO BRAND SPECIFIED    1 Bottle    Use to calibrate blood glucose monitor as needed as directed.    Hyperglycemia       blood glucose monitoring meter device kit    no brand specified    1 kit    Use to test blood sugar as directed.    Hyperglycemia       blood glucose monitoring test strip    no brand specified    100 strip    Use to test blood sugar 4 times daily or as directed.    Hyperglycemia       carvedilol 6.25 MG tablet    COREG    180 tablet    Take 1 tablet (6.25 mg) by mouth 2 times daily (with meals)    Congestive heart failure, unspecified congestive heart failure chronicity, unspecified congestive heart failure type (H)       ferrous sulfate 325 (65 FE) MG tablet    IRON     Take 325 mg by mouth daily (with breakfast)        furosemide 40 MG tablet    LASIX    60 tablet    Take 1 tablet (40 mg) by mouth 2 times daily    Congestive heart failure, unspecified congestive heart failure chronicity, unspecified congestive heart failure type (H)       lisinopril 20 MG tablet    PRINIVIL/ZESTRIL     Take 1 tablet (20 mg) by mouth daily    Coronary artery disease involving  native coronary artery of native heart without angina pectoris, Benign essential hypertension       multivitamin, therapeutic Tabs tablet      Take 1 tablet by mouth daily        nicotine polacrilex 2 MG gum    CVS NICOTINE POLACRILEX    100 tablet    Place 1 each (2 mg) inside cheek as needed for smoking cessation    Tobacco abuse       order for DME     1 Device    Equipment being ordered: Other: Nebulizer machine Treatment Diagnosis: Pneumonia, reactive airway    Community acquired pneumonia of right lower lobe of lung (H)       pantoprazole 40 MG EC tablet    PROTONIX    30 tablet    Take 1 tablet (40 mg) by mouth every morning    Gastroesophageal reflux disease, esophagitis presence not specified       thin lancets    NO BRAND SPECIFIED    100 each    Use with lanceting device.    Hyperglycemia       * Notice:  This list has 4 medication(s) that are the same as other medications prescribed for you. Read the directions carefully, and ask your doctor or other care provider to review them with you.

## 2017-12-12 NOTE — PROGRESS NOTES
"  SUBJECTIVE:   Alfa Cm is a 57 year old male who presents to clinic today for the following health issues:  Pt is here to establish care.  Former care at [facility].  Now moved to [facility] [place] and that clinic is too far.  Has chronic left knee pain and wants to get this checked out.  Was told in the past he needed a knee replacement.  Has not seen orthopedics in many years and wants to get this taken care of now.      Trying to stop smoking   Down to 2-3  A day.  Would like a refill of nicotine gum for PRN use.      Recent hospitalization noted for CAP with acute on chronic CHF, alcohol abuse, with resp failure.  Pt reports he \"had a bad cold\".  Feeling well now.  Denies SOB, VITAL, chest pain.  Reports he is not drinking.      Problem list and histories reviewed & adjusted, as indicated.  Additional history: as documented    Reviewed and updated as needed this visit by clinical staff  Tobacco  Allergies  Meds  Problems  Med Hx  Surg Hx  Fam Hx  Soc Hx        Reviewed and updated as needed this visit by Provider  Tobacco  Allergies  Meds  Problems  Med Hx  Surg Hx  Fam Hx  Soc Hx          ROS:  Constitutional, HEENT, cardiovascular, pulmonary, gi and gu systems are negative, except as otherwise noted.      OBJECTIVE:   /70  Pulse 80  Temp 98  F (36.7  C) (Oral)  Resp 18  Wt 192 lb 3.2 oz (87.2 kg)  SpO2 96%  BMI 30.1 kg/m2  Body mass index is 30.1 kg/(m^2).  GENERAL: healthy, alert and no distress  NECK: no adenopathy, no asymmetry, masses, or scars and thyroid normal to palpation  RESP: lungs clear to auscultation - no rales, rhonchi or wheezes  CV: regular rate and rhythm, normal S1 S2, no S3 or S4, no murmur, click or rub, no peripheral edema and peripheral pulses strong  ABDOMEN: soft, nontender, no hepatosplenomegaly, no masses and bowel sounds normal  MS: no gross musculoskeletal defects noted, no edema      ASSESSMENT/PLAN:       ICD-10-CM    1. Left knee pain, " unspecified chronicity M25.562 ORTHO  REFERRAL   2. ST elevation myocardial infarction involving right coronary artery (H) I21.11    3. Benign essential hypertension I10    4. Tobacco abuse Z72.0 nicotine polacrilex (CVS NICOTINE POLACRILEX) 2 MG gum     Refer to ortho to reestablish re chronic left knee pain.      Strongly encourage smoking cessation with recent history of STEMI, CAP, resp failure.      See Patient Instructions    MATTIE Silva CNP  [address]

## 2018-01-10 DIAGNOSIS — I50.9 CONGESTIVE HEART FAILURE, UNSPECIFIED CONGESTIVE HEART FAILURE CHRONICITY, UNSPECIFIED CONGESTIVE HEART FAILURE TYPE: ICD-10-CM

## 2018-01-10 DIAGNOSIS — K21.9 GASTROESOPHAGEAL REFLUX DISEASE, ESOPHAGITIS PRESENCE NOT SPECIFIED: ICD-10-CM

## 2018-01-10 DIAGNOSIS — I10 BENIGN ESSENTIAL HYPERTENSION: ICD-10-CM

## 2018-01-10 DIAGNOSIS — I25.10 CORONARY ARTERY DISEASE INVOLVING NATIVE CORONARY ARTERY OF NATIVE HEART WITHOUT ANGINA PECTORIS: ICD-10-CM

## 2018-01-10 DIAGNOSIS — I25.10 CORONARY ARTERY DISEASE INVOLVING NATIVE CORONARY ARTERY OF NATIVE HEART WITHOUT ANGINA PECTORIS: Primary | ICD-10-CM

## 2018-01-10 DIAGNOSIS — J98.01 POST-INFECTION BRONCHOSPASM: ICD-10-CM

## 2018-01-10 DIAGNOSIS — R73.9 HYPERGLYCEMIA: ICD-10-CM

## 2018-01-10 DIAGNOSIS — D64.9 ANEMIA, UNSPECIFIED TYPE: ICD-10-CM

## 2018-01-10 RX ORDER — FUROSEMIDE 40 MG
40 TABLET ORAL 2 TIMES DAILY
Qty: 60 TABLET | Refills: 0 | Status: CANCELLED | OUTPATIENT
Start: 2018-01-10

## 2018-01-10 RX ORDER — LISINOPRIL 20 MG/1
20 TABLET ORAL DAILY
Qty: 90 TABLET | Refills: 0 | Status: SHIPPED | OUTPATIENT
Start: 2018-01-10 | End: 2018-01-10

## 2018-01-10 RX ORDER — CARVEDILOL 6.25 MG/1
6.25 TABLET ORAL 2 TIMES DAILY WITH MEALS
Qty: 180 TABLET | Refills: 3 | Status: CANCELLED | OUTPATIENT
Start: 2018-01-10

## 2018-01-10 RX ORDER — LISINOPRIL 20 MG/1
20 TABLET ORAL DAILY
Qty: 30 TABLET | Status: CANCELLED | OUTPATIENT
Start: 2018-01-10

## 2018-01-10 RX ORDER — CARVEDILOL 6.25 MG/1
6.25 TABLET ORAL 2 TIMES DAILY WITH MEALS
Qty: 180 TABLET | Refills: 0 | Status: SHIPPED | OUTPATIENT
Start: 2018-01-10 | End: 2018-01-10

## 2018-01-10 RX ORDER — FUROSEMIDE 40 MG
40 TABLET ORAL 2 TIMES DAILY
Qty: 180 TABLET | Refills: 0 | Status: SHIPPED | OUTPATIENT
Start: 2018-01-10 | End: 2021-03-24

## 2018-01-10 RX ORDER — LISINOPRIL 20 MG/1
20 TABLET ORAL DAILY
Qty: 90 TABLET | Refills: 0 | Status: SHIPPED | OUTPATIENT
Start: 2018-01-10 | End: 2019-04-23

## 2018-01-10 RX ORDER — CARVEDILOL 6.25 MG/1
6.25 TABLET ORAL 2 TIMES DAILY WITH MEALS
Qty: 180 TABLET | Refills: 0 | Status: SHIPPED | OUTPATIENT
Start: 2018-01-10 | End: 2019-04-23

## 2018-01-10 RX ORDER — FUROSEMIDE 40 MG
40 TABLET ORAL 2 TIMES DAILY
Qty: 180 TABLET | Refills: 0 | Status: SHIPPED | OUTPATIENT
Start: 2018-01-10 | End: 2018-01-10

## 2018-01-10 NOTE — TELEPHONE ENCOUNTER
Call received requesting refill of his medications to [facility] mail order. Requesting refill on all medications.   Reviewed medication and EPIC chart. Patient did f/u with MARGARET, Radha Arango back in November after hospitalization at [facility] in October 2017. He never f/u with [facility] Cardiologist after. Also was ordered a Holter monitor and ECHO to be done in November 2017, but again did not follow up. States he moved to [place] and just too far to come back for appointments.     Review of his medications, the 3 cardiac medications which he is about to run out of are Lasix, Lisinopril and Coreg.   He also had Metoprolol and Plavix, however these are not mentioned on the Hospital discharge list nor listed when seen by cardiology MARGARET in November 2017.    Agreed to refill with a 3 month supply with NO refills the Lasix 40 mg twice daily, Lisinopril 20 mg q day, and Coreg 6.25 mg twice daily. Asked patient to call and get an appointment with Cardiology at [facility], and a PMD. emphasis on the fact that he needs both, and will need them soon, as we will not continue to regulate nor prescribe medications,if he is not going to follow up with our cardiology department.   Verbalized understanding. Agreed to call us back once he connects with [facility].     Call received back within 10 minutes with [facility] fax number of [phone] darnell horne, requesting an order for the Holter and ECHO. Phone number of [phone].     Reviewed above with Sandra Arango MARGARET, in absence of Dr. Carson, who agreed with above plan.     Called [facility]. Will set up Cardiology NEW patient once I fax information.

## 2018-01-10 NOTE — LETTER
[address] [address]  [phone]        May 16, 2018    Alfa Cm  [address] [place] [address]              Dear Alfa Cm    This is to remind you that your NON-FASTING LABS is due.    You may call our office at [phone] to schedule an appointment.    Please disregard this notice if you have already had your labs drawn or made an appointment.        Sincerely,        Physician No Ref-Primary

## 2018-01-11 PROBLEM — D64.9 ANEMIA, UNSPECIFIED TYPE: Status: ACTIVE | Noted: 2018-01-11

## 2018-01-11 RX ORDER — ALBUTEROL SULFATE 0.83 MG/ML
3 SOLUTION RESPIRATORY (INHALATION) EVERY 6 HOURS PRN
Qty: 1 BOX | Refills: 1 | Status: SHIPPED | OUTPATIENT
Start: 2018-01-11 | End: 2021-03-24

## 2018-01-11 RX ORDER — LANCETS
EACH MISCELLANEOUS
Qty: 100 EACH | Refills: 1 | Status: CANCELLED | OUTPATIENT
Start: 2018-01-11

## 2018-01-11 RX ORDER — ATORVASTATIN CALCIUM 40 MG/1
40 TABLET, FILM COATED ORAL DAILY
Qty: 90 TABLET | Refills: 3 | Status: SHIPPED | OUTPATIENT
Start: 2018-01-11 | End: 2019-04-23

## 2018-01-11 RX ORDER — ALBUTEROL SULFATE 90 UG/1
1-2 AEROSOL, METERED RESPIRATORY (INHALATION) EVERY 6 HOURS PRN
Status: CANCELLED | OUTPATIENT
Start: 2018-01-11

## 2018-01-11 RX ORDER — ALBUTEROL SULFATE 0.83 MG/ML
3 SOLUTION RESPIRATORY (INHALATION) 4 TIMES DAILY
Qty: 360 ML | Refills: 1 | Status: CANCELLED | OUTPATIENT
Start: 2018-01-11

## 2018-01-11 RX ORDER — PANTOPRAZOLE SODIUM 40 MG/1
40 TABLET, DELAYED RELEASE ORAL EVERY MORNING
Qty: 90 TABLET | Refills: 3 | Status: SHIPPED | OUTPATIENT
Start: 2018-01-11 | End: 2019-04-23

## 2018-01-11 RX ORDER — ALBUTEROL SULFATE 90 UG/1
1-2 AEROSOL, METERED RESPIRATORY (INHALATION) EVERY 6 HOURS PRN
Qty: 1 INHALER | Refills: 5 | Status: SHIPPED | OUTPATIENT
Start: 2018-01-11 | End: 2022-12-19

## 2018-01-11 RX ORDER — FERROUS SULFATE 325(65) MG
325 TABLET ORAL
Qty: 100 TABLET | Status: CANCELLED | OUTPATIENT
Start: 2018-01-11

## 2018-01-11 NOTE — TELEPHONE ENCOUNTER
Routing refill request to provider for review/approval because:  Lipitor, Albuterol inhaler, iron - Medication is reported/historical  Diabetic supplies, protonix, albuterol neb - previously prescribed by different provider

## 2018-01-11 NOTE — TELEPHONE ENCOUNTER
Please contact the patient.     I will send prescription for the atorvastatin and pantoprazole tablets to [facility] as requested.  Some of these other medications (furosemide, carvedilol, lisinopril) have already been sent to [facility].    He may not need some of these other things listed below any longer , so I will not send these for now:    1.  He may not need the iron any longer, that was going to be a short time course only.   I would stop the iron (ferrous sulfate) and we can recheck it in a couple of months.    2.  Also, he only needs to check the glucose a few times per week, if at all.  If the glucose readings have been under 150 for the most part, then no need to continue to check the glucose.    He did not actually have diabetes, it was elevated glucose readings from the acute illness and the steroid medications that he got while ill.   I will not send the diabetic testing supplies unless his glucose readings have been elevated.     3.  The inhalers and nebulizer may not be needed any longer, the inflammation in the airways has hopefully subsided by now.   He does not need to use the albuterol inhaler or nebulizer regularly unless he is having breathing symptoms.  If he is truly needing the take the albuteorl inhaler or nebs on a regular basis (daily), then he definitely needs to return to see me to discuss his breathing issues.      I would just send refills of the albuterol inhaler and nebulizer vials to the [facility] pharmacy, he can contact them if he needs refills.    4.  Return to see me in approximately 1-2 months to recheck his medical issues, including lungs, glucose, and blood counts.    Please get nonfasting labs [facility] 1-2 days before this appointment.  Call [phone] to schedule both appointments.

## 2018-02-01 NOTE — TELEPHONE ENCOUNTER
Dr. Nba GOMEZ      I have spoken with Alfa regarding the information below.  I have given him the information below and he was asking about some other medication that he has been taking. He stated that he is low on it and was wondering if he could get a refill of the medication. I asked about the medication and he stated that he got the medication from the emergency room on 10/9/17.  The medication that he's been taking is clopidogrel (Plavix) 75 mg tablets. The bottle stated to it was no good after 12/2/17 and HCTZ that gave a fill date of 1/16/2017.     I am not sure how much of the medication he last left or how much he has taken.     Marina.FRANCI Grant ([facility])

## 2018-02-02 NOTE — TELEPHONE ENCOUNTER
He likely does not need the Plavix (Clopidegrel) any longer, since it has been more than a year since any stents.     However, I will leave this final determination to the Cardiology Clinic.  He should follow up on his plan to be seen at the [facility] since he lives there now.   He does not need HCTZ any longer because he is on Furosemide (lasix).  He had a number of medications refilled 2 weeks ago, they were received by the mail order pharmacy.

## 2018-02-05 NOTE — TELEPHONE ENCOUNTER
I have spoken with Alfa and gave him the information about no longer taking the medication.  We have also talked about following up with [facility].  Pt stated that he did have an appointment there but he missed it and that he would call them today.     Marina.FRANCI Grant ([facility])

## 2018-02-16 ENCOUNTER — ALLIED HEALTH/NURSE VISIT (OUTPATIENT)
Dept: NURSING | Facility: CLINIC | Age: 58
End: 2018-02-16
Payer: COMMERCIAL

## 2018-02-16 DIAGNOSIS — Z11.1 VISIT FOR MANTOUX TEST: Primary | ICD-10-CM

## 2018-02-16 PROCEDURE — 86580 TB INTRADERMAL TEST: CPT

## 2018-02-16 PROCEDURE — 99207 ZZC NO CHARGE NURSE ONLY: CPT

## 2018-02-16 NOTE — MR AVS SNAPSHOT
"              After Visit Summary   2018    Alfa Cm    MRN: [id number]           Patient Information     Date Of Birth          1960        Visit Information        Provider Department      2018 11:00 AM HP MEDICAL ASSISTANT [facility]        Today's Diagnoses     Visit for Mantoux test    -  1       Follow-ups after your visit        Who to contact     If you have questions or need follow up information about today's clinic visit or your schedule please contact [facility] directly at [phone].  Normal or non-critical lab and imaging results will be communicated to you by Satmexhart, letter or phone within 4 business days after the clinic has received the results. If you do not hear from us within 7 days, please contact the clinic through [employer] or phone. If you have a critical or abnormal lab result, we will notify you by phone as soon as possible.  Submit refill requests through Alleantia or call your pharmacy and they will forward the refill request to us. Please allow 3 business days for your refill to be completed.          Additional Information About Your Visit        MyChart Information     Alleantia lets you send messages to your doctor, view your test results, renew your prescriptions, schedule appointments and more. To sign up, go to [email or web address] . Click on \"Log in\" on the left side of the screen, which will take you to the Welcome page. Then click on \"Sign up Now\" on the right side of the page.     You will be asked to enter the access code listed below, as well as some personal information. Please follow the directions to create your username and password.     Your access code is: [email or web address]  Expires: 2018 11:40 AM     Your access code will  in 90 days. If you need help or a new code, please call your [facility] or [phone].        Care EveryWhere ID     This is your Care EveryWhere ID. This could be used " by other organizations to access your [place] medical records  [id number]         Blood Pressure from Last 3 Encounters:   12/12/17 102/70   11/01/17 122/80   10/31/17 102/72    Weight from Last 3 Encounters:   12/12/17 192 lb 3.2 oz (87.2 kg)   11/01/17 190 lb (86.2 kg)   10/31/17 196 lb (88.9 kg)              We Performed the Following     TB INTRADERMAL TEST        Primary Care Provider Office Phone # Fax #    Lida Walter, MATTIE [facility] [phone] [address] [facility] [address]        Equal Access to Services     SHIKHA MIRANDA : Hadii jeffrey Eldridge, waaxda luqadaha, qaybta kaalmada adealaynayada, ismael hodgson . So [facility] [phone].    ATENCIÓN: Si habla español, tiene a rao disposición servicios gratuitos de asistencia lingüística. Llame al [phone].    We comply with applicable federal civil rights laws and [place] laws. We do not discriminate on the basis of race, color, national origin, age, disability, sex, sexual orientation, or gender identity.            Thank you!     Thank you for choosing [facility]  for your care. Our goal is always to provide you with excellent care. Hearing back from our patients is one way we can continue to improve our services. Please take a few minutes to complete the written survey that you may receive in the mail after your visit with us. Thank you!             Your Updated Medication List - Protect others around you: Learn how to safely use, store and throw away your medicines at www.disposemymeds.org.          This list is accurate as of 2/16/18 11:40 AM.  Always use your most recent med list.                   Brand Name Dispense Instructions for use Diagnosis    * albuterol (2.5 MG/3ML) 0.083% neb solution     1 Box    Take 1 vial (2.5 mg) by nebulization every 6 hours as needed for shortness of breath / dyspnea or wheezing    Post-infection bronchospasm       * albuterol 108 (90  BASE) MCG/ACT Inhaler    PROAIR HFA/PROVENTIL HFA/VENTOLIN HFA    1 Inhaler    Inhale 1-2 puffs into the lungs every 6 hours as needed for shortness of breath / dyspnea or wheezing    Post-infection bronchospasm       alcohol swab prep pads     100 each    Use to swab area of injection/kylee as directed.    Hyperglycemia       ALLEGRA ALLERGY PO           * ASPIRIN ADULT LOW STRENGTH PO      Take 81 mg by mouth daily        * aspirin 81 MG tablet      Take 1 tablet (81 mg) by mouth daily    Coronary artery disease involving native coronary artery of native heart without angina pectoris       atorvastatin 40 MG tablet    LIPITOR    90 tablet    Take 1 tablet (40 mg) by mouth daily    Coronary artery disease involving native coronary artery of native heart without angina pectoris       blood glucose calibration solution    NO BRAND SPECIFIED    1 Bottle    Use to calibrate blood glucose monitor as needed as directed.    Hyperglycemia       blood glucose monitoring meter device kit    no brand specified    1 kit    Use to test blood sugar as directed.    Hyperglycemia       blood glucose monitoring test strip    no brand specified    100 strip    Use to test blood sugar 4 times daily or as directed.    Hyperglycemia       carvedilol 6.25 MG tablet    COREG    180 tablet    Take 1 tablet (6.25 mg) by mouth 2 times daily (with meals)    Congestive heart failure, unspecified congestive heart failure chronicity, unspecified congestive heart failure type (H)       furosemide 40 MG tablet    LASIX    180 tablet    Take 1 tablet (40 mg) by mouth 2 times daily    Congestive heart failure, unspecified congestive heart failure chronicity, unspecified congestive heart failure type (H)       lisinopril 20 MG tablet    PRINIVIL/ZESTRIL    90 tablet    Take 1 tablet (20 mg) by mouth daily    Coronary artery disease involving native coronary artery of native heart without angina pectoris, Benign essential hypertension        multivitamin, therapeutic Tabs tablet      Take 1 tablet by mouth daily        nicotine polacrilex 2 MG gum    [employer] NICOTINE POLACRILEX    100 tablet    Place 1 each (2 mg) inside cheek as needed for smoking cessation    Tobacco abuse       order for DME     1 Device    Equipment being ordered: Other: Nebulizer machine Treatment Diagnosis: Pneumonia, reactive airway    Community acquired pneumonia of right lower lobe of lung (H)       pantoprazole 40 MG EC tablet    PROTONIX    90 tablet    Take 1 tablet (40 mg) by mouth every morning    Gastroesophageal reflux disease, esophagitis presence not specified       thin lancets    NO BRAND SPECIFIED    100 each    Use with lanceting device.    Hyperglycemia       * Notice:  This list has 4 medication(s) that are the same as other medications prescribed for you. Read the directions carefully, and ask your doctor or other care provider to review them with you.

## 2018-02-16 NOTE — NURSING NOTE
The patient is asked the following questions today and these are his answers:    -Have you had a mantoux administered in the past 30 days?    No  -Have you had a previous positive Mantoux.  No  -Have you received BCG in the past.  No  -Have you had a live vaccine  (MMR, Varicella, OPV, Yellow Fever) in the last 6 weeks.  No  -Have you had and active  viral or bacterial infection in the past 6 weeks.  No  -Have you received corticosteroids or immunosuppressive agents in the past 6 weeks.  No  -Have you been diagnosed with HIV?  No  -Do you have a maglinancy?  No     Patricia Collins MA

## 2018-02-16 NOTE — LETTER
February 23, 2018      Alfa Shahzad Cm  [address]  [address] [place] [address]        Dear ,    We are writing to inform you of your test results.    Normal results.     Resulted Orders   TB INTRADERMAL TEST   Result Value Ref Range    PPD Induration 0 0 - 5 mm    PPD Redness 0 mm       If you have any questions or concerns, please call the clinic at the number listed above.       Sincerely,    Lianne Flowers MD/nr

## 2018-02-19 ENCOUNTER — ALLIED HEALTH/NURSE VISIT (OUTPATIENT)
Dept: NURSING | Facility: CLINIC | Age: 58
End: 2018-02-19
Payer: COMMERCIAL

## 2018-02-19 DIAGNOSIS — Z11.1 SCREENING EXAMINATION FOR PULMONARY TUBERCULOSIS: Primary | ICD-10-CM

## 2018-02-19 LAB
PPDINDURATION: 0 MM (ref 0–5)
PPDREDNESS: 0 MM

## 2018-02-19 PROCEDURE — 99207 ZZC NO CHARGE NURSE ONLY: CPT

## 2018-02-19 NOTE — MR AVS SNAPSHOT
"              After Visit Summary   2018    Alfa Cm    MRN: [id number]           Patient Information     Date Of Birth          1960        Visit Information        Provider Department      2018 11:30 AM HP RN/TRIAGE NURSE ONLY [facility]        Today's Diagnoses     Screening examination for pulmonary tuberculosis    -  1       Follow-ups after your visit        Who to contact     If you have questions or need follow up information about today's clinic visit or your schedule please contact [facility] directly at [phone].  Normal or non-critical lab and imaging results will be communicated to you by YR Freehart, letter or phone within 4 business days after the clinic has received the results. If you do not hear from us within 7 days, please contact the clinic through [email or web address] or phone. If you have a critical or abnormal lab result, we will notify you by phone as soon as possible.  Submit refill requests through [employer] or call your pharmacy and they will forward the refill request to us. Please allow 3 business days for your refill to be completed.          Additional Information About Your Visit        MyChart Information     [employer]ets lets you send messages to your doctor, view your test results, renew your prescriptions, schedule appointments and more. To sign up, go to [email or web address] . Click on \"Log in\" on the left side of the screen, which will take you to the Welcome page. Then click on \"Sign up Now\" on the right side of the page.     You will be asked to enter the access code listed below, as well as some personal information. Please follow the directions to create your username and password.     Your access code is: [email or web address]  Expires: 2018 11:40 AM     Your access code will  in 90 days. If you need help or a new code, please call your [facility] or [phone].        Care EveryWhere ID     This is your Care " EveryWhere ID. This could be used by other organizations to access your [facility] medical records  [id number]         Blood Pressure from Last 3 Encounters:   12/12/17 102/70   11/01/17 122/80   10/31/17 102/72    Weight from Last 3 Encounters:   12/12/17 192 lb 3.2 oz (87.2 kg)   11/01/17 190 lb (86.2 kg)   10/31/17 196 lb (88.9 kg)              Today, you had the following     No orders found for display       Primary Care Provider Office Phone # Fax #    MATTIE Silva [phone] [address] [facility] [address]        Equal Access to Services     SHIKHA MIRANDA : Hadii jeffrey riverao Solisa, waaxda luqadaha, qaybta kaalmada adeegyada, ismael hodgson . So [facility] [phone].    ATENCIÓN: Si habla español, tiene a rao disposición servicios gratuitos de asistencia lingüística. Llame al [phone].    We comply with applicable federal civil rights laws and [place] laws. We do not discriminate on the basis of race, color, national origin, age, disability, sex, sexual orientation, or gender identity.            Thank you!     Thank you for choosing [facility]  for your care. Our goal is always to provide you with excellent care. Hearing back from our patients is one way we can continue to improve our services. Please take a few minutes to complete the written survey that you may receive in the mail after your visit with us. Thank you!             Your Updated Medication List - Protect others around you: Learn how to safely use, store and throw away your medicines at www.disposemymeds.org.          This list is accurate as of 2/19/18 11:32 AM.  Always use your most recent med list.                   Brand Name Dispense Instructions for use Diagnosis    * albuterol (2.5 MG/3ML) 0.083% neb solution     1 Box    Take 1 vial (2.5 mg) by nebulization every 6 hours as needed for shortness of breath / dyspnea or wheezing     Post-infection bronchospasm       * albuterol 108 (90 BASE) MCG/ACT Inhaler    PROAIR HFA/PROVENTIL HFA/VENTOLIN HFA    1 Inhaler    Inhale 1-2 puffs into the lungs every 6 hours as needed for shortness of breath / dyspnea or wheezing    Post-infection bronchospasm       alcohol swab prep pads     100 each    Use to swab area of injection/kylee as directed.    Hyperglycemia       ALLEGRA ALLERGY PO           * ASPIRIN ADULT LOW STRENGTH PO      Take 81 mg by mouth daily        * aspirin 81 MG tablet      Take 1 tablet (81 mg) by mouth daily    Coronary artery disease involving native coronary artery of native heart without angina pectoris       atorvastatin 40 MG tablet    LIPITOR    90 tablet    Take 1 tablet (40 mg) by mouth daily    Coronary artery disease involving native coronary artery of native heart without angina pectoris       blood glucose calibration solution    NO BRAND SPECIFIED    1 Bottle    Use to calibrate blood glucose monitor as needed as directed.    Hyperglycemia       blood glucose monitoring meter device kit    no brand specified    1 kit    Use to test blood sugar as directed.    Hyperglycemia       blood glucose monitoring test strip    no brand specified    100 strip    Use to test blood sugar 4 times daily or as directed.    Hyperglycemia       carvedilol 6.25 MG tablet    COREG    180 tablet    Take 1 tablet (6.25 mg) by mouth 2 times daily (with meals)    Congestive heart failure, unspecified congestive heart failure chronicity, unspecified congestive heart failure type (H)       furosemide 40 MG tablet    LASIX    180 tablet    Take 1 tablet (40 mg) by mouth 2 times daily    Congestive heart failure, unspecified congestive heart failure chronicity, unspecified congestive heart failure type (H)       lisinopril 20 MG tablet    PRINIVIL/ZESTRIL    90 tablet    Take 1 tablet (20 mg) by mouth daily    Coronary artery disease involving native coronary artery of native heart without angina  pectoris, Benign essential hypertension       multivitamin, therapeutic Tabs tablet      Take 1 tablet by mouth daily        nicotine polacrilex 2 MG gum    [employer]S NICOTINE POLACRILEX    100 tablet    Place 1 each (2 mg) inside cheek as needed for smoking cessation    Tobacco abuse       order for DME     1 Device    Equipment being ordered: Other: Nebulizer machine Treatment Diagnosis: Pneumonia, reactive airway    Community acquired pneumonia of right lower lobe of lung (H)       pantoprazole 40 MG EC tablet    PROTONIX    90 tablet    Take 1 tablet (40 mg) by mouth every morning    Gastroesophageal reflux disease, esophagitis presence not specified       thin lancets    NO BRAND SPECIFIED    100 each    Use with lanceting device.    Hyperglycemia       * Notice:  This list has 4 medication(s) that are the same as other medications prescribed for you. Read the directions carefully, and ask your doctor or other care provider to review them with you.

## 2018-02-21 ENCOUNTER — TELEPHONE (OUTPATIENT)
Dept: FAMILY MEDICINE | Facility: CLINIC | Age: 58
End: 2018-02-21

## 2018-02-21 NOTE — LETTER
[facility]   [address] [place], [place]  [address]  [phone]        February 21, 2018      RE: Alfa Cm  [address]  [address]  [place] [address]        To whom it may concern:    Alfa Cm is under my professional care. Mantoux result:  Lab Results   Component Value Date    PPDREDNESS 0 02/19/2018    PPDINDURATIO 0 02/19/2018     Mantoux was negative.    Sincerely,    Lida Walter, CNP/hg

## 2018-02-21 NOTE — TELEPHONE ENCOUNTER
The patient lost previous documentation of his mantoux reading results. Please generate a letter with results and contact the patient when letter can be picked up at the , okay to leave a message.

## 2018-03-12 ENCOUNTER — ALLIED HEALTH/NURSE VISIT (OUTPATIENT)
Dept: LAB | Facility: CLINIC | Age: 58
End: 2018-03-12
Payer: COMMERCIAL

## 2018-03-12 DIAGNOSIS — Z11.1 SCREENING EXAMINATION FOR PULMONARY TUBERCULOSIS: Primary | ICD-10-CM

## 2018-03-12 PROCEDURE — 86480 TB TEST CELL IMMUN MEASURE: CPT | Performed by: FAMILY MEDICINE

## 2018-03-14 LAB
M TB TUBERC IFN-G BLD QL: NEGATIVE
M TB TUBERC IFN-G/MITOGEN IGNF BLD: 0 IU/ML

## 2018-03-28 ENCOUNTER — NURSE TRIAGE (OUTPATIENT)
Dept: NURSING | Facility: CLINIC | Age: 58
End: 2018-03-28

## 2018-04-03 PROBLEM — I50.42 CHRONIC COMBINED SYSTOLIC AND DIASTOLIC CONGESTIVE HEART FAILURE (H): Status: ACTIVE | Noted: 2018-04-03

## 2018-04-09 ENCOUNTER — TRANSFERRED RECORDS (OUTPATIENT)
Dept: HEALTH INFORMATION MANAGEMENT | Facility: CLINIC | Age: 58
End: 2018-04-09

## 2018-04-11 ENCOUNTER — DOCUMENTATION ONLY (OUTPATIENT)
Dept: CARDIOLOGY | Facility: CLINIC | Age: 58
End: 2018-04-11

## 2018-04-11 NOTE — PROGRESS NOTES
Received echocardiogram, dated 04-09-18, from [facility].     Patient transferred his care to [place] back in November 2017.     Echocardiogram report was sent to scanning.     Alayna JOSHI  [facility]

## 2018-04-17 NOTE — PROGRESS NOTES
In reviewing echo report from [place], ordering provider states Radha Arango NP.    Patient no showed for his Echo and Holter on 11-14-17 and cancelled his appt with Dr. Carson on 11-20-17. Patient was transferring his care to [place].      See telephone encounter from 01-10-18.   Our clinic received a call from patient for medication refills.      Patient needed refills on Lasix, Lisinopril, and Coreg. Patient was given a 3 month supply with NO refills. Patient stated he moved to [place] and it was just too far to come back for appointments.  Patient was asked to call and make an appointment with Cardiology at [facility], and with a PMD. Patient agreed to call our clinic once he connected with [facility].        We then received a call 10 minutes later (that day),  requesting us to fax an Echo and Holter order to [facility] (fax number of [phone]) Minal Arianna. Orders were faxed.    Since patient is seeing [facility] and not following up with our clinic. The Echo and Holter should have been ordered by patient's new cardiologist at [facility], and by not Radha Arango NP.     Attempted to contact patient, patient had no voicemail.    Called [facility], patient has not establish care.     TGarbers RN  [facility]

## 2018-04-18 ENCOUNTER — NURSE TRIAGE (OUTPATIENT)
Dept: NURSING | Facility: CLINIC | Age: 58
End: 2018-04-18

## 2018-04-18 NOTE — TELEPHONE ENCOUNTER
He was wanting a copy of his TB test faxed. The call disconnected. I would have connected him to medical records.  Becca August RN-[facility]

## 2018-04-18 NOTE — TELEPHONE ENCOUNTER
Needs TB test results faxed to his employer/transferred to the medical records line   James Fisher RN [phone]

## 2018-04-18 NOTE — PROGRESS NOTES
Spoke to Hina, patient's spouse. Hina will have patient call me back.     TGarbers RN  [facility]

## 2018-04-18 NOTE — PROGRESS NOTES
Spoke to patient.    Patient did not see a cardiologist at [facility].     Patient was advised to make an appt here or at [facility] to review his recent studies.     Patient agreed to make an appt at our [place] site and will be seeing Dr. Swanson.     Alayna JOSHI  [facility]

## 2018-05-09 ENCOUNTER — OFFICE VISIT (OUTPATIENT)
Dept: CARDIOLOGY | Facility: CLINIC | Age: 58
End: 2018-05-09
Payer: COMMERCIAL

## 2018-05-09 VITALS
WEIGHT: 186.9 LBS | BODY MASS INDEX: 29.27 KG/M2 | DIASTOLIC BLOOD PRESSURE: 80 MMHG | HEART RATE: 74 BPM | OXYGEN SATURATION: 97 % | SYSTOLIC BLOOD PRESSURE: 114 MMHG

## 2018-05-09 DIAGNOSIS — I25.10 CORONARY ARTERY DISEASE INVOLVING NATIVE CORONARY ARTERY OF NATIVE HEART WITHOUT ANGINA PECTORIS: ICD-10-CM

## 2018-05-09 DIAGNOSIS — I25.5 ISCHEMIC CARDIOMYOPATHY: ICD-10-CM

## 2018-05-09 DIAGNOSIS — G47.33 OSA (OBSTRUCTIVE SLEEP APNEA): Primary | ICD-10-CM

## 2018-05-09 PROCEDURE — 99214 OFFICE O/P EST MOD 30 MIN: CPT | Performed by: INTERNAL MEDICINE

## 2018-05-09 NOTE — PROGRESS NOTES
"HISTORY:    Alfa Cm is a 57-year-old gentleman accompanied by his wife today.  He has a history of coronary artery disease, ischemic cardiomyopathy, hypertension, alcohol and tobacco dependence, and a remote history of a CVA.    Alfa underwent stenting of his right coronary artery in 2012 and presented late with an inferior STEMI in September 2016.  He was found to have a subtotally occluded RCA with massive amounts of thrombus and underwent thrombectomy but not repeat stenting.  His troponin riana to greater than 200 on that admission, and subsequent echocardiogram showed that his ejection fraction had diminished to 30-35%.  He was also noted to have RV dysfunction and mild to moderate pulmonary hypertension.  His last visit with cardiology was November 2017.    Today Alfa reports that he is continuing to do well.  He tells me that his energy level is fine but his wife disagrees.  She does not give specific but states that not himself\".  He feels that his stamina is fine and he denies any exertional chest, arm, neck, or jaw discomfort.  He recalls his previous angina and has not had any degree of this discomfort.  He states that he can walk up a flight of steps and is mildly short of breath at the top but does not have to stop and rest.  He is not having problems with palpitations, syncope or near syncope, strokelike symptoms, claudication, or significant peripheral edema.    The patient continues to smoke a pack per day although his wife thinks he smokes more.  He has cut back on his alcohol and is currently using about 3 beers per day.  He is not using his carvedilol correctly, taking it only once a day in the morning.  He is tolerating all of his medications without side effects, and misunderstood the directions for use of carvedilol.    Alfa's exam, listed below, does not suggest significant volume overload, and is essentially normal.  I reviewed the results of his Holter monitor today which showed " occasional PVCs but no complex ectopy.  I also reviewed the results of his recent echocardiogram done at [facility], and it showed no significant change compared to recent echos done through our organization.      ASSESSMENT/PLAN:    1.  Coronary artery disease.  The patient is on appropriate medications including aspirin, Lipitor, carvedilol, Lasix, and lisinopril.  He was instructed to begin using his carvedilol twice per day.  He is agreeable with this plan.  No other medication changes were made at this time.  2.  Cigarette use.  I again strongly encouraged the patient to give up cigarettes, he seems marginally interested.  3.  CHF.  History of CHF, no volume overload on exam today and minimal symptoms.  4.  Ischemic cardiomyopathy.  Appropriate medications.  Aggressive treatment is limited by low blood pressure.  We will have him double his carvedilol to twice a day, misunderstood and being used once a day currently.    Thank you for allowing me to participate in your patient's care.  I will continue to follow him on a six-month basis.  Please do not hesitate to call if I can be of further assistance.    Orders Placed This Encounter   Procedures     SLEEP EVALUATION & MANAGEMENT REFERRAL - [facility]  [phone] (Age 18 and up)     Follow-Up with Cardiologist     No orders of the defined types were placed in this encounter.    Medications Discontinued During This Encounter   Medication Reason     ASPIRIN ADULT LOW STRENGTH PO Medication Reconciliation Clean Up         Encounter Diagnoses   Name Primary?     GUNNAR (obstructive sleep apnea) Yes     Ischemic cardiomyopathy      Coronary artery disease involving native coronary artery of native heart without angina pectoris        CURRENT MEDICATIONS:  Current Outpatient Prescriptions   Medication Sig Dispense Refill     albuterol (2.5 MG/3ML) 0.083% neb solution Take 1 vial (2.5 mg) by nebulization every 6 hours as needed for  shortness of breath / dyspnea or wheezing 1 Box 1     albuterol (PROAIR HFA/PROVENTIL HFA/VENTOLIN HFA) 108 (90 BASE) MCG/ACT Inhaler Inhale 1-2 puffs into the lungs every 6 hours as needed for shortness of breath / dyspnea or wheezing 1 Inhaler 5     alcohol swab prep pads Use to swab area of injection/kylee as directed. 100 each 3     aspirin 81 MG tablet Take 1 tablet (81 mg) by mouth daily       atorvastatin (LIPITOR) 40 MG tablet Take 1 tablet (40 mg) by mouth daily 90 tablet 3     blood glucose calibration (NO BRAND SPECIFIED) solution Use to calibrate blood glucose monitor as needed as directed. 1 Bottle 3     blood glucose monitoring (NO BRAND SPECIFIED) meter device kit Use to test blood sugar as directed. 1 kit 0     blood glucose monitoring (NO BRAND SPECIFIED) test strip Use to test blood sugar 4 times daily or as directed. 100 strip 6     carvedilol (COREG) 6.25 MG tablet Take 1 tablet (6.25 mg) by mouth 2 times daily (with meals) 180 tablet 0     Fexofenadine HCl (ALLEGRA ALLERGY PO)        furosemide (LASIX) 40 MG tablet Take 1 tablet (40 mg) by mouth 2 times daily 180 tablet 0     lisinopril (PRINIVIL/ZESTRIL) 20 MG tablet Take 1 tablet (20 mg) by mouth daily 90 tablet 0     multivitamin, therapeutic (THERA-VIT) TABS tablet Take 1 tablet by mouth daily       nicotine polacrilex ([employer] NICOTINE POLACRILEX) 2 MG gum Place 1 each (2 mg) inside cheek as needed for smoking cessation 100 tablet 1     order for DME Equipment being ordered: Other: Nebulizer machine  Treatment Diagnosis: Pneumonia, reactive airway 1 Device 0     pantoprazole (PROTONIX) 40 MG EC tablet Take 1 tablet (40 mg) by mouth every morning 90 tablet 3     thin (NO BRAND SPECIFIED) lancets Use with lanceting device. 100 each 1       ALLERGIES     Allergies   Allergen Reactions     Pollen Extract        PAST MEDICAL HISTORY:  Past Medical History:   Diagnosis Date     Alcohol abuse      Coronary artery disease involving native coronary  artery without angina pectoris 2011    angiplasty, stent placed at [facility]     Hyperlipidemia      Hypertension      Myocardial infarction      Substance abuse      Tobacco abuse        PAST SURGICAL HISTORY:  Past Surgical History:   Procedure Laterality Date     MS PATIENT HAS A CORONARY ARTERY STENT         FAMILY HISTORY:  Family History   Problem Relation Age of Onset     DIABETES Mother        SOCIAL HISTORY:  Social History     Social History     Marital status:      Spouse name: N/A     Number of children: N/A     Years of education: N/A     Social History Main Topics     Smoking status: Former Smoker     Packs/day: 0.50     Types: Cigarettes     Smokeless tobacco: Never Used      Comment: quit date 10/16/17     Alcohol use 12.0 oz/week     20 Cans of beer per week     Drug use: No     Sexual activity: No     Other Topics Concern     None     Social History Narrative       Review of Systems:  Skin:  Negative for     Eyes:  Positive for glasses  ENT:  Negative    Respiratory:  Positive for shortness of breath;cough;wheezing;sleep apnea  Cardiovascular:    Positive for;fatigue  Gastroenterology: Negative poor appetite  Genitourinary:  Negative    Musculoskeletal:  Positive for back pain;joint stiffness;arthritis  Neurologic:  Negative    Psychiatric:  Positive for sleep disturbances  Heme/Lymph/Imm:  Positive for allergies;hay fever;night sweats  Endocrine:  Positive for night sweats    Physical Exam:  Vitals: /80 (BP Location: Right arm, Patient Position: Chair, Cuff Size: Adult Regular)  Pulse 74  Wt 84.8 kg (186 lb 14.4 oz)  SpO2 97%  BMI 29.27 kg/m2    Constitutional:           Skin:           Head:           Eyes:           ENT:           Neck:           Chest:           Cardiac:                    Abdomen:           Vascular:                                        Extremities and Back:           Neurological:             Recent Lab Results:  LIPID RESULTS:  Lab Results    Component Value Date    CHOL 155 10/31/2017    HDL 47 10/31/2017    LDL 87 10/31/2017    TRIG 105 10/31/2017       LIVER ENZYME RESULTS:  Lab Results   Component Value Date    AST 17 10/31/2017    ALT 21 10/31/2017       CBC RESULTS:  Lab Results   Component Value Date    WBC 7.9 10/31/2017    RBC 4.34 (L) 10/31/2017    HGB 13.1 (L) 10/31/2017    HCT 40.7 10/31/2017    MCV 94 10/31/2017    MCH 30.2 10/31/2017    MCHC 32.2 10/31/2017    RDW 14.9 10/31/2017     10/31/2017       BMP RESULTS:  Lab Results   Component Value Date     10/31/2017    POTASSIUM 4.2 10/31/2017    CHLORIDE 104 10/31/2017    CO2 27 10/31/2017    ANIONGAP 9 10/31/2017    GLC 87 10/31/2017    BUN 7 10/31/2017    CR 0.92 10/31/2017    GFRESTIMATED 85 10/31/2017    GFRESTBLACK >90 10/31/2017    GRANT 9.3 10/31/2017        A1C RESULTS:  Lab Results   Component Value Date    A1C 5.6 10/11/2017       INR RESULTS:  Lab Results   Component Value Date    INR 1.22 (H) 09/30/2016         Darshan Swanson MD, [facility]    CC  Referred Self, MD  No address on file

## 2018-05-09 NOTE — MR AVS SNAPSHOT
After Visit Summary   5/9/2018    Alfa Cm    MRN: [id number]           Patient Information     Date Of Birth          1960        Visit Information        Provider Department      5/9/2018 9:45 AM Darshan Swanson MD [facility]   [place]        Today's Diagnoses     GUNNAR (obstructive sleep apnea)    -  1    Ischemic cardiomyopathy        Coronary artery disease involving native coronary artery of native heart without angina pectoris           Follow-ups after your visit        Additional Services     SLEEP EVALUATION & MANAGEMENT REFERRAL - [facility]  [phone] (Age 18 and up)       Please be aware that coverage of these services is subject to the terms and limitations of your health insurance plan.  Call member services at your health plan with any benefit or coverage questions.      Please bring the following to your appointment:    >>   List of current medications   >>   This referral request   >>   Any documents/labs given to you for this referral                Follow-Up with Cardiologist                 Follow-up notes from your care team     Return in about 6 months (around 11/9/2018).      Future tests that were ordered for you today     Open Future Orders        Priority Expected Expires Ordered    Follow-Up with Cardiologist Routine 11/5/2018 5/9/2019 5/9/2018    SLEEP EVALUATION & MANAGEMENT REFERRAL - [facility]  [phone] (Age 18 and up) Routine 5/16/2018 5/9/2019 5/9/2018            Who to contact     If you have questions or need follow up information about today's clinic visit or your schedule please contact [facility]   [facility] directly at [phone].  Normal or non-critical lab and imaging results will be communicated to you by MyChart, letter or phone within 4 business days after the clinic has received the results. If you do not  "hear from us within 7 days, please contact the clinic through [facility] or phone. If you have a critical or abnormal lab result, we will notify you by phone as soon as possible.  Submit refill requests through [facility] [facility] or call your pharmacy and they will forward the refill request to us. Please allow 3 business days for your refill to be completed.          Additional Information About Your Visit        [employer] Information     [facility] [facility] lets you send messages to your doctor, view your test results, renew your prescriptions, schedule appointments and more. To sign up, go to [email or web address] . Click on \"Log in\" on the left side of the screen, which will take you to the Welcome page. Then click on \"Sign up Now\" on the right side of the page.     You will be asked to enter the access code listed below, as well as some personal information. Please follow the directions to create your username and password.     Your access code is: [email or web address]  Expires: 2018 12:40 PM     Your access code will  in 90 days. If you need help or a new code, please call your [place] clinic or [phone].        Care EveryWhere ID     This is your Care EveryWhere ID. This could be used by other organizations to access your [place] medical records  [id number]        Your Vitals Were     Pulse Pulse Oximetry BMI (Body Mass Index)             74 97% 29.27 kg/m2          Blood Pressure from Last 3 Encounters:   18 114/80   17 102/70   17 122/80    Weight from Last 3 Encounters:   18 84.8 kg (186 lb 14.4 oz)   17 87.2 kg (192 lb 3.2 oz)   17 86.2 kg (190 lb)               Primary Care Provider Office Phone # Fax #    Lida MATTIE [facility] [phone] [address] [facility] [address]        Equal Access to Services     ANA MARIA MIRANDA AH: Kristie Eldridge, wamic gaines, qaismael carreno ah. So [facility] " [phone].    ATENCIÓN: Si reji mercado, tiene a rao disposición servicios gratuitos de asistencia lingüística. García garcia [phone].    We comply with applicable federal civil rights laws and [place] laws. We do not discriminate on the basis of race, color, national origin, age, disability, sex, sexual orientation, or gender identity.            Thank you!     [facility]. Our goal is always to provide you with excellent care. Hearing back from our patients is one way we can continue to improve our services. Please take a few minutes to complete the written survey that you may receive in the mail after your visit with us. Thank you!             Your Updated Medication List - Protect others around you: Learn how to safely use, store and throw away your medicines at www.disposemymeds.org.          This list is accurate as of 5/9/18 10:31 AM.  Always use your most recent med list.                   Brand Name Dispense Instructions for use Diagnosis    * albuterol (2.5 MG/3ML) 0.083% neb solution     1 Box    Take 1 vial (2.5 mg) by nebulization every 6 hours as needed for shortness of breath / dyspnea or wheezing    Post-infection bronchospasm       * albuterol 108 (90 Base) MCG/ACT Inhaler    PROAIR HFA/PROVENTIL HFA/VENTOLIN HFA    1 Inhaler    Inhale 1-2 puffs into the lungs every 6 hours as needed for shortness of breath / dyspnea or wheezing    Post-infection bronchospasm       alcohol swab prep pads     100 each    Use to swab area of injection/kylee as directed.    Hyperglycemia       ALLEGRA ALLERGY PO           aspirin 81 MG tablet      Take 1 tablet (81 mg) by mouth daily    Coronary artery disease involving native coronary artery of native heart without angina pectoris       atorvastatin 40 MG tablet    LIPITOR    90 tablet    Take 1 tablet (40 mg) by mouth daily    Coronary artery disease involving native coronary artery of  native heart without angina pectoris       blood glucose calibration solution    NO BRAND SPECIFIED    1 Bottle    Use to calibrate blood glucose monitor as needed as directed.    Hyperglycemia       blood glucose monitoring meter device kit    no brand specified    1 kit    Use to test blood sugar as directed.    Hyperglycemia       blood glucose monitoring test strip    no brand specified    100 strip    Use to test blood sugar 4 times daily or as directed.    Hyperglycemia       carvedilol 6.25 MG tablet    COREG    180 tablet    Take 1 tablet (6.25 mg) by mouth 2 times daily (with meals)    Congestive heart failure, unspecified congestive heart failure chronicity, unspecified congestive heart failure type (H)       furosemide 40 MG tablet    LASIX    180 tablet    Take 1 tablet (40 mg) by mouth 2 times daily    Congestive heart failure, unspecified congestive heart failure chronicity, unspecified congestive heart failure type (H)       lisinopril 20 MG tablet    PRINIVIL/ZESTRIL    90 tablet    Take 1 tablet (20 mg) by mouth daily    Coronary artery disease involving native coronary artery of native heart without angina pectoris, Benign essential hypertension       multivitamin, therapeutic Tabs tablet      Take 1 tablet by mouth daily        nicotine polacrilex 2 MG gum    [employer] NICOTINE POLACRILEX    100 tablet    Place 1 each (2 mg) inside cheek as needed for smoking cessation    Tobacco abuse       order for DME     1 Device    Equipment being ordered: Other: Nebulizer machine Treatment Diagnosis: Pneumonia, reactive airway    Community acquired pneumonia of right lower lobe of lung (H)       pantoprazole 40 MG EC tablet    PROTONIX    90 tablet    Take 1 tablet (40 mg) by mouth every morning    Gastroesophageal reflux disease, esophagitis presence not specified       thin lancets    NO BRAND SPECIFIED    100 each    Use with lanceting device.    Hyperglycemia       * Notice:  This list has 2 medication(s)  that are the same as other medications prescribed for you. Read the directions carefully, and ask your doctor or other care provider to review them with you.

## 2018-05-09 NOTE — LETTER
"5/9/2018    Lida Walter, APRN CNP  [address] [address]    RE: Alfa Cm       Dear Colleague,    I had the pleasure of seeing Alfa Cm in the [facility].    HISTORY:    Alfa Cm is a 57-year-old gentleman accompanied by his wife today.  He has a history of coronary artery disease, ischemic cardiomyopathy, hypertension, alcohol and tobacco dependence, and a remote history of a CVA.    Alfa underwent stenting of his right coronary artery in 2012 and presented late with an inferior STEMI in September 2016.  He was found to have a subtotally occluded RCA with massive amounts of thrombus and underwent thrombectomy but not repeat stenting.  His troponin riana to greater than 200 on that admission, and subsequent echocardiogram showed that his ejection fraction had diminished to 30-35%.  He was also noted to have RV dysfunction and mild to moderate pulmonary hypertension.  His last visit with cardiology was November 2017.    Today Alfa reports that he is continuing to do well.  He tells me that his energy level is fine but his wife disagrees.  She does not give specific but states that not himself\".  He feels that his stamina is fine and he denies any exertional chest, arm, neck, or jaw discomfort.  He recalls his previous angina and has not had any degree of this discomfort.  He states that he can walk up a flight of steps and is mildly short of breath at the top but does not have to stop and rest.  He is not having problems with palpitations, syncope or near syncope, strokelike symptoms, claudication, or significant peripheral edema.    The patient continues to smoke a pack per day although his wife thinks he smokes more.  He has cut back on his alcohol and is currently using about 3 beers per day.  He is not using his carvedilol correctly, taking it only once a day in the morning.  He is tolerating all of his medications " without side effects, and misunderstood the directions for use of carvedilol.    Alfa's exam, listed below, does not suggest significant volume overload, and is essentially normal.  I reviewed the results of his Holter monitor today which showed occasional PVCs but no complex ectopy.  I also reviewed the results of his recent echocardiogram done at [facility], and it showed no significant change compared to recent echos done through our organization.      ASSESSMENT/PLAN:    1.  Coronary artery disease.  The patient is on appropriate medications including aspirin, Lipitor, carvedilol, Lasix, and lisinopril.  He was instructed to begin using his carvedilol twice per day.  He is agreeable with this plan.  No other medication changes were made at this time.  2.  Cigarette use.  I again strongly encouraged the patient to give up cigarettes, he seems marginally interested.  3.  CHF.  History of CHF, no volume overload on exam today and minimal symptoms.  4.  Ischemic cardiomyopathy.  Appropriate medications.  Aggressive treatment is limited by low blood pressure.  We will have him double his carvedilol to twice a day, misunderstood and being used once a day currently.    Thank you for allowing me to participate in your patient's care.  I will continue to follow him on a six-month basis.  Please do not hesitate to call if I can be of further assistance.    Orders Placed This Encounter   Procedures     SLEEP EVALUATION & MANAGEMENT REFERRAL - [facility]  [phone] (Age 18 and up)     Follow-Up with Cardiologist     No orders of the defined types were placed in this encounter.    Medications Discontinued During This Encounter   Medication Reason     ASPIRIN ADULT LOW STRENGTH PO Medication Reconciliation Clean Up         Encounter Diagnoses   Name Primary?     GUNNAR (obstructive sleep apnea) Yes     Ischemic cardiomyopathy      Coronary artery disease involving native coronary artery of  native heart without angina pectoris        CURRENT MEDICATIONS:  Current Outpatient Prescriptions   Medication Sig Dispense Refill     albuterol (2.5 MG/3ML) 0.083% neb solution Take 1 vial (2.5 mg) by nebulization every 6 hours as needed for shortness of breath / dyspnea or wheezing 1 Box 1     albuterol (PROAIR HFA/PROVENTIL HFA/VENTOLIN HFA) 108 (90 BASE) MCG/ACT Inhaler Inhale 1-2 puffs into the lungs every 6 hours as needed for shortness of breath / dyspnea or wheezing 1 Inhaler 5     alcohol swab prep pads Use to swab area of injection/kylee as directed. 100 each 3     aspirin 81 MG tablet Take 1 tablet (81 mg) by mouth daily       atorvastatin (LIPITOR) 40 MG tablet Take 1 tablet (40 mg) by mouth daily 90 tablet 3     blood glucose calibration (NO BRAND SPECIFIED) solution Use to calibrate blood glucose monitor as needed as directed. 1 Bottle 3     blood glucose monitoring (NO BRAND SPECIFIED) meter device kit Use to test blood sugar as directed. 1 kit 0     blood glucose monitoring (NO BRAND SPECIFIED) test strip Use to test blood sugar 4 times daily or as directed. 100 strip 6     carvedilol (COREG) 6.25 MG tablet Take 1 tablet (6.25 mg) by mouth 2 times daily (with meals) 180 tablet 0     Fexofenadine HCl (ALLEGRA ALLERGY PO)        furosemide (LASIX) 40 MG tablet Take 1 tablet (40 mg) by mouth 2 times daily 180 tablet 0     lisinopril (PRINIVIL/ZESTRIL) 20 MG tablet Take 1 tablet (20 mg) by mouth daily 90 tablet 0     multivitamin, therapeutic (THERA-VIT) TABS tablet Take 1 tablet by mouth daily       nicotine polacrilex ([employer] NICOTINE POLACRILEX) 2 MG gum Place 1 each (2 mg) inside cheek as needed for smoking cessation 100 tablet 1     order for DME Equipment being ordered: Other: Nebulizer machine  Treatment Diagnosis: Pneumonia, reactive airway 1 Device 0     pantoprazole (PROTONIX) 40 MG EC tablet Take 1 tablet (40 mg) by mouth every morning 90 tablet 3     thin (NO BRAND SPECIFIED) lancets Use with  lanceting device. 100 each 1       ALLERGIES     Allergies   Allergen Reactions     Pollen Extract        PAST MEDICAL HISTORY:  Past Medical History:   Diagnosis Date     Alcohol abuse      Coronary artery disease involving native coronary artery without angina pectoris 2011    angiplasty, stent placed at [facility]     Hyperlipidemia      Hypertension      Myocardial infarction      Substance abuse      Tobacco abuse        PAST SURGICAL HISTORY:  Past Surgical History:   Procedure Laterality Date     OK PATIENT HAS A CORONARY ARTERY STENT         FAMILY HISTORY:  Family History   Problem Relation Age of Onset     DIABETES Mother        SOCIAL HISTORY:  Social History     Social History     Marital status:      Spouse name: N/A     Number of children: N/A     Years of education: N/A     Social History Main Topics     Smoking status: Former Smoker     Packs/day: 0.50     Types: Cigarettes     Smokeless tobacco: Never Used      Comment: quit date 10/16/17     Alcohol use 12.0 oz/week     20 Cans of beer per week     Drug use: No     Sexual activity: No     Other Topics Concern     None     Social History Narrative       Review of Systems:  Skin:  Negative for     Eyes:  Positive for glasses  ENT:  Negative    Respiratory:  Positive for shortness of breath;cough;wheezing;sleep apnea  Cardiovascular:    Positive for;fatigue  Gastroenterology: Negative poor appetite  Genitourinary:  Negative    Musculoskeletal:  Positive for back pain;joint stiffness;arthritis  Neurologic:  Negative    Psychiatric:  Positive for sleep disturbances  Heme/Lymph/Imm:  Positive for allergies;hay fever;night sweats  Endocrine:  Positive for night sweats    Physical Exam:  Vitals: /80 (BP Location: Right arm, Patient Position: Chair, Cuff Size: Adult Regular)  Pulse 74  Wt 84.8 kg (186 lb 14.4 oz)  SpO2 97%  BMI 29.27 kg/m2    Constitutional:           Skin:           Head:           Eyes:           ENT:            Neck:           Chest:           Cardiac:                    Abdomen:           Vascular:                                        Extremities and Back:           Neurological:             Recent Lab Results:  LIPID RESULTS:  Lab Results   Component Value Date    CHOL 155 10/31/2017    HDL 47 10/31/2017    LDL 87 10/31/2017    TRIG 105 10/31/2017       LIVER ENZYME RESULTS:  Lab Results   Component Value Date    AST 17 10/31/2017    ALT 21 10/31/2017       CBC RESULTS:  Lab Results   Component Value Date    WBC 7.9 10/31/2017    RBC 4.34 (L) 10/31/2017    HGB 13.1 (L) 10/31/2017    HCT 40.7 10/31/2017    MCV 94 10/31/2017    MCH 30.2 10/31/2017    MCHC 32.2 10/31/2017    RDW 14.9 10/31/2017     10/31/2017     BMP RESULTS:  Lab Results   Component Value Date     10/31/2017    POTASSIUM 4.2 10/31/2017    CHLORIDE 104 10/31/2017    CO2 27 10/31/2017    ANIONGAP 9 10/31/2017    GLC 87 10/31/2017    BUN 7 10/31/2017    CR 0.92 10/31/2017    GFRESTIMATED 85 10/31/2017    GFRESTBLACK >90 10/31/2017    GRANT 9.3 10/31/2017      A1C RESULTS:  Lab Results   Component Value Date    A1C 5.6 10/11/2017     INR RESULTS:  Lab Results   Component Value Date    INR 1.22 (H) 09/30/2016     Thank you for allowing me to participate in the care of your patient.    Sincerely,     Darshan Swanson MD     [facility]

## 2018-06-21 ENCOUNTER — TELEPHONE (OUTPATIENT)
Dept: LAB | Facility: CLINIC | Age: 58
End: 2018-06-21

## 2019-01-09 ENCOUNTER — TELEPHONE (OUTPATIENT)
Dept: ADDICTION MEDICINE | Facility: CLINIC | Age: 59
End: 2019-01-09

## 2019-01-09 ENCOUNTER — ANCILLARY PROCEDURE (OUTPATIENT)
Dept: GENERAL RADIOLOGY | Facility: CLINIC | Age: 59
End: 2019-01-09
Payer: COMMERCIAL

## 2019-01-09 ENCOUNTER — OFFICE VISIT (OUTPATIENT)
Dept: FAMILY MEDICINE | Facility: CLINIC | Age: 59
End: 2019-01-09
Payer: COMMERCIAL

## 2019-01-09 VITALS
TEMPERATURE: 98.2 F | HEART RATE: 86 BPM | DIASTOLIC BLOOD PRESSURE: 82 MMHG | RESPIRATION RATE: 16 BRPM | WEIGHT: 182 LBS | OXYGEN SATURATION: 95 % | SYSTOLIC BLOOD PRESSURE: 120 MMHG | BODY MASS INDEX: 27.58 KG/M2 | HEIGHT: 68 IN

## 2019-01-09 DIAGNOSIS — F10.29 ALCOHOL DEPENDENCE WITH UNSPECIFIED ALCOHOL-INDUCED DISORDER (H): ICD-10-CM

## 2019-01-09 DIAGNOSIS — J20.9 ACUTE BRONCHITIS WITH SYMPTOMS > 10 DAYS: Primary | ICD-10-CM

## 2019-01-09 DIAGNOSIS — R05.9 COUGH: ICD-10-CM

## 2019-01-09 DIAGNOSIS — I50.42 CHRONIC COMBINED SYSTOLIC AND DIASTOLIC CONGESTIVE HEART FAILURE (H): ICD-10-CM

## 2019-01-09 LAB
ANION GAP SERPL CALCULATED.3IONS-SCNC: 11 MMOL/L (ref 3–14)
BASOPHILS # BLD AUTO: 0 10E9/L (ref 0–0.2)
BASOPHILS NFR BLD AUTO: 0.2 %
BUN SERPL-MCNC: 7 MG/DL (ref 7–30)
CALCIUM SERPL-MCNC: 8.1 MG/DL (ref 8.5–10.1)
CHLORIDE SERPL-SCNC: 106 MMOL/L (ref 94–109)
CO2 SERPL-SCNC: 22 MMOL/L (ref 20–32)
CREAT SERPL-MCNC: 0.96 MG/DL (ref 0.66–1.25)
DIFFERENTIAL METHOD BLD: ABNORMAL
EOSINOPHIL # BLD AUTO: 0.1 10E9/L (ref 0–0.7)
EOSINOPHIL NFR BLD AUTO: 0.7 %
ERYTHROCYTE [DISTWIDTH] IN BLOOD BY AUTOMATED COUNT: 18.1 % (ref 10–15)
GFR SERPL CREATININE-BSD FRML MDRD: 88 ML/MIN/{1.73_M2}
GLUCOSE SERPL-MCNC: 107 MG/DL (ref 70–99)
HCT VFR BLD AUTO: 38.4 % (ref 40–53)
HGB BLD-MCNC: 13.2 G/DL (ref 13.3–17.7)
LYMPHOCYTES # BLD AUTO: 2.1 10E9/L (ref 0.8–5.3)
LYMPHOCYTES NFR BLD AUTO: 23.6 %
MCH RBC QN AUTO: 32 PG (ref 26.5–33)
MCHC RBC AUTO-ENTMCNC: 34.4 G/DL (ref 31.5–36.5)
MCV RBC AUTO: 93 FL (ref 78–100)
MONOCYTES # BLD AUTO: 0.6 10E9/L (ref 0–1.3)
MONOCYTES NFR BLD AUTO: 6.3 %
NEUTROPHILS # BLD AUTO: 6.1 10E9/L (ref 1.6–8.3)
NEUTROPHILS NFR BLD AUTO: 69.2 %
NT-PROBNP SERPL-MCNC: 133 PG/ML (ref 0–125)
PLATELET # BLD AUTO: 164 10E9/L (ref 150–450)
POTASSIUM SERPL-SCNC: 3.9 MMOL/L (ref 3.4–5.3)
RBC # BLD AUTO: 4.13 10E12/L (ref 4.4–5.9)
SODIUM SERPL-SCNC: 139 MMOL/L (ref 133–144)
WBC # BLD AUTO: 8.8 10E9/L (ref 4–11)

## 2019-01-09 PROCEDURE — 99214 OFFICE O/P EST MOD 30 MIN: CPT | Performed by: NURSE PRACTITIONER

## 2019-01-09 PROCEDURE — 80048 BASIC METABOLIC PNL TOTAL CA: CPT | Performed by: NURSE PRACTITIONER

## 2019-01-09 PROCEDURE — 36415 COLL VENOUS BLD VENIPUNCTURE: CPT | Performed by: NURSE PRACTITIONER

## 2019-01-09 PROCEDURE — 71046 X-RAY EXAM CHEST 2 VIEWS: CPT | Mod: FY

## 2019-01-09 PROCEDURE — 83880 ASSAY OF NATRIURETIC PEPTIDE: CPT | Performed by: NURSE PRACTITIONER

## 2019-01-09 PROCEDURE — 85025 COMPLETE CBC W/AUTO DIFF WBC: CPT | Performed by: NURSE PRACTITIONER

## 2019-01-09 RX ORDER — PREDNISONE 20 MG/1
20 TABLET ORAL DAILY
Qty: 7 TABLET | Refills: 0 | Status: SHIPPED | OUTPATIENT
Start: 2019-01-09 | End: 2019-01-16

## 2019-01-09 RX ORDER — AZITHROMYCIN 250 MG/1
TABLET, FILM COATED ORAL
Qty: 6 TABLET | Refills: 0 | Status: SHIPPED | OUTPATIENT
Start: 2019-01-09 | End: 2019-01-14

## 2019-01-09 ASSESSMENT — MIFFLIN-ST. JEOR: SCORE: 1630.05

## 2019-01-09 NOTE — PROGRESS NOTES
SUBJECTIVE:   Alfa Cm is a 58 year old male who presents to clinic today for the following health issues:      Acute Illness   Acute illness concerns: cold  Onset: x 2 months    Fever: no    Chills/Sweats: YES    Headache (location?): no     Sinus Pressure:YES    Conjunctivitis:  no    Ear Pain: no    Rhinorrhea: YES--intermittent    Congestion: YES    Sore Throat: no      Cough: YES-productive of yellow sputum    Wheeze: YES    Decreased Appetite: YES-  X 6 weeks    Nausea: YES    Vomiting: no     Diarrhea:  no     Dysuria/Freq.: no     Fatigue/Achiness: YES    Sick/Strep Exposure: YES- grandchild     Therapies Tried and outcome: nothing    He is here today with his significant other.  Cough is the most bothersome.  He is a smoker.  Denies SOB, CP.  He used his neb and inhaler yesterday and felt some relief, but not a lot.  He had a flu vaccine this year.  He hasn't seen his PCP in over a year, missed cardiology follow up Nov 2018.  Alfa reports drinking 2-3 beers/day and 2-3 shots of liquor.  His last drink was yesterday.  He has done AA in the past, but not in many years.  Thinking about going back.  Feels like he is not able to quit drinking on his own; would like to see someone regarding his alcohol use.        Problem list and histories reviewed & adjusted, as indicated.  Additional history: as documented    Current Outpatient Medications   Medication Sig Dispense Refill     albuterol (2.5 MG/3ML) 0.083% neb solution Take 1 vial (2.5 mg) by nebulization every 6 hours as needed for shortness of breath / dyspnea or wheezing 1 Box 1     albuterol (PROAIR HFA/PROVENTIL HFA/VENTOLIN HFA) 108 (90 BASE) MCG/ACT Inhaler Inhale 1-2 puffs into the lungs every 6 hours as needed for shortness of breath / dyspnea or wheezing 1 Inhaler 5     alcohol swab prep pads Use to swab area of injection/kylee as directed. 100 each 3     aspirin 81 MG tablet Take 1 tablet (81 mg) by mouth daily       atorvastatin  (LIPITOR) 40 MG tablet Take 1 tablet (40 mg) by mouth daily 90 tablet 3     azithromycin (ZITHROMAX) 250 MG tablet Take 2 tablets (500 mg) by mouth daily for 1 day, THEN 1 tablet (250 mg) daily for 4 days. 6 tablet 0     blood glucose calibration (NO BRAND SPECIFIED) solution Use to calibrate blood glucose monitor as needed as directed. 1 Bottle 3     blood glucose monitoring (NO BRAND SPECIFIED) meter device kit Use to test blood sugar as directed. 1 kit 0     blood glucose monitoring (NO BRAND SPECIFIED) test strip Use to test blood sugar 4 times daily or as directed. 100 strip 6     carvedilol (COREG) 6.25 MG tablet Take 1 tablet (6.25 mg) by mouth 2 times daily (with meals) 180 tablet 0     Fexofenadine HCl (ALLEGRA ALLERGY PO)        furosemide (LASIX) 40 MG tablet Take 1 tablet (40 mg) by mouth 2 times daily 180 tablet 0     lisinopril (PRINIVIL/ZESTRIL) 20 MG tablet Take 1 tablet (20 mg) by mouth daily 90 tablet 0     multivitamin, therapeutic (THERA-VIT) TABS tablet Take 1 tablet by mouth daily       nicotine polacrilex ([employer] NICOTINE POLACRILEX) 2 MG gum Place 1 each (2 mg) inside cheek as needed for smoking cessation 100 tablet 1     order for DME Equipment being ordered: Other: Nebulizer machine  Treatment Diagnosis: Pneumonia, reactive airway 1 Device 0     pantoprazole (PROTONIX) 40 MG EC tablet Take 1 tablet (40 mg) by mouth every morning 90 tablet 3     predniSONE (DELTASONE) 20 MG tablet Take 20 mg by mouth daily for 7 days. 7 tablet 0     thin (NO BRAND SPECIFIED) lancets Use with lanceting device. 100 each 1     Allergies   Allergen Reactions     Pollen Extract        Reviewed and updated as needed this visit by clinical staff  Tobacco  Allergies  Meds  Med Hx  Surg Hx  Fam Hx  Soc Hx      Reviewed and updated as needed this visit by Provider         ROS:  Constitutional, HEENT, cardiovascular, pulmonary, gi and gu systems are negative, except as otherwise noted.    OBJECTIVE:     /82  "(BP Location: Right arm, Patient Position: Sitting, Cuff Size: Adult Regular)   Pulse 86   Temp 98.2  F (36.8  C) (Oral)   Resp 16   Ht 1.727 m (5' 8\")   Wt 82.6 kg (182 lb)   SpO2 93%   BMI 27.67 kg/m    Body mass index is 27.67 kg/m .  GENERAL: healthy, alert and no distress  EYES: Eyes grossly normal to inspection and conjunctivae and sclerae normal  HENT: ear canals and TM's normal, nose and mouth without ulcers or lesions  NECK: no adenopathy, no asymmetry, masses, or scars and thyroid normal to palpation  RESP: diminished throughout with faint scattered rhonchi, no wheezing or rales, normal effort  CV: regular rate and rhythm, normal S1 S2, no S3 or S4, no murmur, click or rub, no peripheral edema   MS: moves all extremities, fine tremor in upper extremities most notable with arms outstretched  SKIN: no suspicious lesions or rashes on exposed skin     Diagnostic Test Results:  Results for orders placed or performed in visit on 01/09/19   CBC with platelets differential   Result Value Ref Range    WBC 8.8 4.0 - 11.0 10e9/L    RBC Count 4.13 (L) 4.4 - 5.9 10e12/L    Hemoglobin 13.2 (L) 13.3 - 17.7 g/dL    Hematocrit 38.4 (L) 40.0 - 53.0 %    MCV 93 78 - 100 fl    MCH 32.0 26.5 - 33.0 pg    MCHC 34.4 31.5 - 36.5 g/dL    RDW 18.1 (H) 10.0 - 15.0 %    Platelet Count 164 150 - 450 10e9/L    % Neutrophils 69.2 %    % Lymphocytes 23.6 %    % Monocytes 6.3 %    % Eosinophils 0.7 %    % Basophils 0.2 %    Absolute Neutrophil 6.1 1.6 - 8.3 10e9/L    Absolute Lymphocytes 2.1 0.8 - 5.3 10e9/L    Absolute Monocytes 0.6 0.0 - 1.3 10e9/L    Absolute Eosinophils 0.1 0.0 - 0.7 10e9/L    Absolute Basophils 0.0 0.0 - 0.2 10e9/L    Diff Method Automated Method      CHEST TWO VIEWS  1/9/2019 11:21 AM      HISTORY: 58-year-old with cough.                                                                       IMPRESSION: Since October 17, 2017, heart size remains enlarged.  Minimal scattered bibasilar opacities, most suggestive " of atelectasis,  improved since previous exam. No pleural effusion, pneumothorax, or  abnormal area of consolidation.     TY CORDOVA MD    ASSESSMENT/PLAN:   1. Cough  No pneumonia noted on cxr.    - XR Chest 2 Views; Future  - Basic metabolic panel  - CBC with platelets differential  - BNP-N terminal pro    2. Acute bronchitis with symptoms > 10 days  Will cover with zpak and steroids.  Has albuterol nebs and inhaler at home; can use as needed.  Recheck in 1 week; sooner if any worsening.   - azithromycin (ZITHROMAX) 250 MG tablet; Take 2 tablets (500 mg) by mouth daily for 1 day, THEN 1 tablet (250 mg) daily for 4 days.  Dispense: 6 tablet; Refill: 0  - predniSONE (DELTASONE) 20 MG tablet; Take 20 mg by mouth daily for 7 days.  Dispense: 7 tablet; Refill: 0    3. Chronic combined systolic and diastolic congestive heart failure (H)  Hx of CHF.  Does not appear to have acute fluid overload.   - BNP-N terminal pro    4. Alcohol dependence with unspecified alcohol-induced disorder (H)  Requesting referral.   - ADDICTION MEDICINE REFERRAL        [facility]

## 2019-01-11 NOTE — TELEPHONE ENCOUNTER
Writer attempted to reach pt; no answer. LVM requesting a call back for an appt. Two more attempts will be made.      Juan Berman

## 2019-01-16 NOTE — TELEPHONE ENCOUNTER
Second attempt to reach pt; no answer. LVM requesting a call back for an appt. A final attempt will be made.     uJan Berman

## 2019-01-21 NOTE — TELEPHONE ENCOUNTER
Writer spoke with pt, he declined an appt stating that he's doing really good. Writer did give pt the clinic telephone number if pt changes his mind and want an appt. Routing encounter to referring provider and Dr. Rush.      Juan Berman     [facility]   [phone]

## 2019-03-04 ENCOUNTER — HOSPITAL ENCOUNTER (EMERGENCY)
Facility: CLINIC | Age: 59
Discharge: HOME | End: 2019-03-04
Attending: EMERGENCY MEDICINE | Admitting: EMERGENCY MEDICINE
Payer: COMMERCIAL

## 2019-03-04 VITALS
RESPIRATION RATE: 16 BRPM | SYSTOLIC BLOOD PRESSURE: 152 MMHG | OXYGEN SATURATION: 99 % | DIASTOLIC BLOOD PRESSURE: 103 MMHG | TEMPERATURE: 98.5 F

## 2019-03-04 DIAGNOSIS — F10.10 ALCOHOL ABUSE: ICD-10-CM

## 2019-03-04 DIAGNOSIS — K70.9 LIVER DISEASE, CHRONIC, DUE TO ALCOHOL (H): ICD-10-CM

## 2019-03-04 LAB
ALBUMIN SERPL-MCNC: 3.3 G/DL (ref 3.4–5)
ALP SERPL-CCNC: 188 U/L (ref 40–150)
ALT SERPL W P-5'-P-CCNC: 103 U/L (ref 0–70)
ANION GAP SERPL CALCULATED.3IONS-SCNC: 11 MMOL/L (ref 3–14)
AST SERPL W P-5'-P-CCNC: 325 U/L (ref 0–45)
BASOPHILS # BLD AUTO: 0 10E9/L (ref 0–0.2)
BASOPHILS NFR BLD AUTO: 0.7 %
BILIRUB SERPL-MCNC: 1.8 MG/DL (ref 0.2–1.3)
BUN SERPL-MCNC: 5 MG/DL (ref 7–30)
CALCIUM SERPL-MCNC: 8.7 MG/DL (ref 8.5–10.1)
CHLORIDE SERPL-SCNC: 103 MMOL/L (ref 94–109)
CO2 SERPL-SCNC: 23 MMOL/L (ref 20–32)
CREAT SERPL-MCNC: 0.7 MG/DL (ref 0.66–1.25)
DIFFERENTIAL METHOD BLD: ABNORMAL
EOSINOPHIL # BLD AUTO: 0 10E9/L (ref 0–0.7)
EOSINOPHIL NFR BLD AUTO: 0.4 %
ERYTHROCYTE [DISTWIDTH] IN BLOOD BY AUTOMATED COUNT: 15.2 % (ref 10–15)
GFR SERPL CREATININE-BSD FRML MDRD: >90 ML/MIN/{1.73_M2}
GLUCOSE SERPL-MCNC: 136 MG/DL (ref 70–99)
HCT VFR BLD AUTO: 40.2 % (ref 40–53)
HGB BLD-MCNC: 13.7 G/DL (ref 13.3–17.7)
IMM GRANULOCYTES # BLD: 0 10E9/L (ref 0–0.4)
IMM GRANULOCYTES NFR BLD: 0.2 %
INR PPP: 1.06 (ref 0.86–1.14)
LYMPHOCYTES # BLD AUTO: 1.4 10E9/L (ref 0.8–5.3)
LYMPHOCYTES NFR BLD AUTO: 25.7 %
MCH RBC QN AUTO: 33.7 PG (ref 26.5–33)
MCHC RBC AUTO-ENTMCNC: 34.1 G/DL (ref 31.5–36.5)
MCV RBC AUTO: 99 FL (ref 78–100)
MONOCYTES # BLD AUTO: 0.7 10E9/L (ref 0–1.3)
MONOCYTES NFR BLD AUTO: 13 %
NEUTROPHILS # BLD AUTO: 3.2 10E9/L (ref 1.6–8.3)
NEUTROPHILS NFR BLD AUTO: 60 %
NRBC # BLD AUTO: 0 10*3/UL
NRBC BLD AUTO-RTO: 0 /100
PLATELET # BLD AUTO: 152 10E9/L (ref 150–450)
POTASSIUM SERPL-SCNC: 3.6 MMOL/L (ref 3.4–5.3)
PROT SERPL-MCNC: 8.1 G/DL (ref 6.8–8.8)
RBC # BLD AUTO: 4.06 10E12/L (ref 4.4–5.9)
SODIUM SERPL-SCNC: 137 MMOL/L (ref 133–144)
WBC # BLD AUTO: 5.4 10E9/L (ref 4–11)

## 2019-03-04 PROCEDURE — 99283 EMERGENCY DEPT VISIT LOW MDM: CPT

## 2019-03-04 PROCEDURE — 36415 COLL VENOUS BLD VENIPUNCTURE: CPT | Performed by: EMERGENCY MEDICINE

## 2019-03-04 PROCEDURE — 85610 PROTHROMBIN TIME: CPT | Performed by: EMERGENCY MEDICINE

## 2019-03-04 PROCEDURE — 85025 COMPLETE CBC W/AUTO DIFF WBC: CPT | Performed by: EMERGENCY MEDICINE

## 2019-03-04 PROCEDURE — 25000132 ZZH RX MED GY IP 250 OP 250 PS 637: Performed by: EMERGENCY MEDICINE

## 2019-03-04 PROCEDURE — 80053 COMPREHEN METABOLIC PANEL: CPT | Performed by: EMERGENCY MEDICINE

## 2019-03-04 RX ORDER — HYDROXYZINE HYDROCHLORIDE 25 MG/1
50 TABLET, FILM COATED ORAL ONCE
Status: COMPLETED | OUTPATIENT
Start: 2019-03-04 | End: 2019-03-04

## 2019-03-04 RX ORDER — HYDROXYZINE HYDROCHLORIDE 25 MG/1
50 TABLET, FILM COATED ORAL EVERY 6 HOURS PRN
Qty: 30 TABLET | Refills: 0 | Status: SHIPPED | OUTPATIENT
Start: 2019-03-04 | End: 2021-03-24

## 2019-03-04 RX ORDER — LORAZEPAM 1 MG/1
.5-1 TABLET ORAL 3 TIMES DAILY PRN
Qty: 15 TABLET | Refills: 0 | Status: SHIPPED | OUTPATIENT
Start: 2019-03-04 | End: 2020-06-05

## 2019-03-04 RX ADMIN — HYDROXYZINE HYDROCHLORIDE 50 MG: 25 TABLET ORAL at 12:36

## 2019-03-04 ASSESSMENT — ENCOUNTER SYMPTOMS
UNEXPECTED WEIGHT CHANGE: 1
APPETITE CHANGE: 1
ABDOMINAL PAIN: 0

## 2019-03-04 NOTE — ED AVS SNAPSHOT
[facility]  [address]  [facility] [address]  Phone:  [phone]  Fax:  [phone]                                    Alfa Cm   MRN: [id number]    Department:  [facility]   Date of Visit:  3/4/2019           After Visit Summary Signature Page    I have received my discharge instructions, and my questions have been answered. I have discussed any challenges I see with this plan with the nurse or doctor.    ..........................................................................................................................................  Patient/Patient Representative Signature      ..........................................................................................................................................  Patient Representative Print Name and Relationship to Patient    ..................................................               ................................................  Date                                   Time    ..........................................................................................................................................  Reviewed by Signature/Title    ...................................................              ..............................................  Date                                               Time          22EPIC Rev 08/18

## 2019-03-04 NOTE — ED TRIAGE NOTES
Poor appetite the last couple of months. States he was trying to loose weight at first but is not now. Denies abdominal pain but family states he has had pain. Patient states he was 230# and now is 185#. Family member states pt sometimes has nose bleeding at night.    Family also notes patient has been drinking too much alcohol and they are concerned about withdrawal. Pt is argumentative with his family. Family member is doing most of the talking for the patient.

## 2019-03-04 NOTE — ED PROVIDER NOTES
"  History     Chief Complaint:  Weight Loss and Epistaxis    The history is provided by the patient.      Alfa Cm is a 58 year old male with a history of CHF, CAD, STEMI, hyperlipidemia, HTN, substance abuse who presents to the emergency department for evaluation of weight loss and epistaxis. Here, the patient states he is a little jittery, had a nose bleed this morning that he woke up with, and notes his wife states he does not eat and has lost about 50 lbs. in the past three months. Patient states he has lost about 45 lbs in the past 5 years and has lost about 20 lbs in the past month. The patient admits he is not hungry, thus does not eat, and mentions that his alcohol consumption is affecting his eating. He says he is jittery from his \"allergies\" and drinks alcohol to help with this jittery feeling, stating his last drink was this morning. He states he only had one drink yesterday and only had a 12 pack last week. Wife, who is not in the room with the patient, wanted to have the patient evaluated here in the emergency department. He denies abdominal pain.       Allergies:  Pollen Extract     Medications:    Albuterol  Aspirin, 81 mg  Lipitor  Carvedilol  Allergra  Lasix  Lisinopril  Protonix  Nicotine    Past Medical History:    Chronic combined systolic and diastolic CHF  Anemia  Sepsis  STEMI  CAD  HTN  Hyperlipidemia  Alcohol abuse  Substance abuse  Tobacco Abuse    Past Surgical History:    Coronary artery stent placement    Family History:    diabetes mellitus    Social History:  Current smoker: 0.50 ppd  Positive for alcohol use.  Negative for drug use.  Marital Status:       Review of Systems   Constitutional: Positive for appetite change and unexpected weight change.   HENT: Positive for nosebleeds (improved now).    Gastrointestinal: Negative for abdominal pain.   All other systems reviewed and are negative.    Physical Exam     Patient Vitals for the past 24 hrs:   BP Temp Temp src " Heart Rate Resp SpO2   03/04/19 1149 (!) 152/103 98.5  F (36.9  C) Temporal 92 16 99 %     Physical Exam    Nursing note and vitals reviewed.  Constitutional: Cooperative.   HENT:   Mouth/Throat: Moist mucous membranes.   Eyes: EOMI, nonicteric sclera  Cardiovascular: Normal rate, regular rhythm, no murmurs, rubs, or gallops  Pulmonary/Chest: Effort normal and breath sounds normal. No respiratory distress. No wheezes. No rales.   Abdominal: Soft. Nontender, nondistended, no guarding or rigidity. BS present.   Musculoskeletal: Normal range of motion.   Neurological: Alert. Moves all extremities spontaneously.   Skin: Skin is warm and dry. No rash noted.   Psychiatric: Normal mood and affect.     Emergency Department Course   Laboratory:  CBC: WBC: 5.4, HGB: 13.7, PLT: 152  CMP: Glucose 136 (H), BUN 5 (L), Bilirubin 1.8 (H), Albumin 3.3 (L), Alk phos 188 (H),  (H),  (H), o/w WNL (Creatinine: 0.70)  INR: 1.06     Interventions:  1236 Hydroxyzine 50 mg tablet    Emergency Department Course:  1205 Nursing notes and vitals reviewed.  I performed an exam of the patient as documented above.     Medicine administered as documented above. Blood drawn. This was sent to the lab for further testing, results above.    1336 I rechecked the patient and discussed the results of his workup thus far.     Findings and plan explained to the Patient. Patient discharged home with instructions regarding supportive care, medications, and reasons to return. The importance of close follow-up was reviewed. The patient was prescribed Atarax and Ativan.    I personally reviewed the laboratory results with the Patient and answered all related questions prior to discharge.   Impression & Plan    Medical Decision Making:  Pt presents with multiple complaints, worst of which is a feeling of jitteriness/anxiety he states is due to alcohol withdrawal. Pt downplays his drinking, but his wife and chart review suggests that pt does have an  "alcohol problem. He has declined resources in the past and declines resources today. He has evidence of liver disease on laboratory analysis with most likely etiology alcohol liver disease. He has no abdominal tenderness on exam to prompt imaging for workup of cholecystitis/choledocholithiasis. Pt does report some weightloss which would be concerning for malignancy and likely needs further workup as an outpt with either imaging and/or gastroenterology. Given no pain, this isn't indicated in this emergency setting. Pt feels better after hydroxyzine. He was counseled on need to stop drinking, but denies resources. He did give me permission to speak with his wife about his workup, which I did separately. There is no signs of alcohol withdrawal at this time, though pt states he had a couple drinks this morning to \"take the edge off.\" I'll prescribe hydroxyzine to take first and ativan to take 2nd for alcohol withdrawal symptoms. Discussed with pt need to follow-up with his own doctor for further workup and any further medication needs. He is in stable condition at the time of discharge, indications for return to the ED were discussed as well as follow up. All questions were answered and he is in agreement with the plan.      Diagnosis:    ICD-10-CM    1. Liver disease, chronic, due to alcohol (H) K70.9    2. Alcohol abuse F10.10        Disposition:  discharged to home    Discharge Medications:     Medication List      Started    hydrOXYzine 25 MG tablet  Commonly known as:  ATARAX  50 mg, Oral, EVERY 6 HOURS PRN     LORazepam 1 MG tablet  Commonly known as:  ATIVAN  0.5-1 mg, Oral, 3 TIMES DAILY PRN          Scribe Disclosure:  I, Lydia Thompson, am serving as a scribe on 3/4/2019 at 12:14 PM to personally document services performed by Igor Tian MD based on my observations and the provider's statements to me.     Lydia Thompson  3/4/2019   [facility] EMERGENCY DEPARTMENT       Igro Tian, " MD  03/06/19 2022

## 2019-04-23 ENCOUNTER — OFFICE VISIT (OUTPATIENT)
Dept: CARDIOLOGY | Facility: CLINIC | Age: 59
End: 2019-04-23
Payer: COMMERCIAL

## 2019-04-23 VITALS
OXYGEN SATURATION: 97 % | BODY MASS INDEX: 27.58 KG/M2 | DIASTOLIC BLOOD PRESSURE: 90 MMHG | SYSTOLIC BLOOD PRESSURE: 142 MMHG | HEIGHT: 68 IN | WEIGHT: 182 LBS | HEART RATE: 81 BPM

## 2019-04-23 DIAGNOSIS — I25.5 ISCHEMIC CARDIOMYOPATHY: ICD-10-CM

## 2019-04-23 DIAGNOSIS — G47.33 OSA (OBSTRUCTIVE SLEEP APNEA): ICD-10-CM

## 2019-04-23 DIAGNOSIS — I50.22 CHRONIC SYSTOLIC CONGESTIVE HEART FAILURE (H): ICD-10-CM

## 2019-04-23 DIAGNOSIS — I25.10 CORONARY ARTERY DISEASE INVOLVING NATIVE CORONARY ARTERY OF NATIVE HEART WITHOUT ANGINA PECTORIS: ICD-10-CM

## 2019-04-23 DIAGNOSIS — K21.9 GASTROESOPHAGEAL REFLUX DISEASE, ESOPHAGITIS PRESENCE NOT SPECIFIED: ICD-10-CM

## 2019-04-23 DIAGNOSIS — I10 BENIGN ESSENTIAL HYPERTENSION: ICD-10-CM

## 2019-04-23 PROCEDURE — 99214 OFFICE O/P EST MOD 30 MIN: CPT | Performed by: INTERNAL MEDICINE

## 2019-04-23 PROCEDURE — 93000 ELECTROCARDIOGRAM COMPLETE: CPT | Performed by: INTERNAL MEDICINE

## 2019-04-23 RX ORDER — CARVEDILOL 6.25 MG/1
6.25 TABLET ORAL 2 TIMES DAILY WITH MEALS
Qty: 180 TABLET | Refills: 3 | Status: CANCELLED | OUTPATIENT
Start: 2019-04-23

## 2019-04-23 RX ORDER — CARVEDILOL 12.5 MG/1
12.5 TABLET ORAL 2 TIMES DAILY WITH MEALS
Qty: 90 TABLET | Refills: 3 | Status: SHIPPED | OUTPATIENT
Start: 2019-04-23 | End: 2020-04-28

## 2019-04-23 RX ORDER — LISINOPRIL 20 MG/1
20 TABLET ORAL DAILY
Qty: 90 TABLET | Refills: 0 | Status: SHIPPED | OUTPATIENT
Start: 2019-04-23 | End: 2019-08-05

## 2019-04-23 RX ORDER — PANTOPRAZOLE SODIUM 40 MG/1
40 TABLET, DELAYED RELEASE ORAL EVERY MORNING
Qty: 90 TABLET | Refills: 3 | Status: SHIPPED | OUTPATIENT
Start: 2019-04-23 | End: 2019-04-23

## 2019-04-23 RX ORDER — ATORVASTATIN CALCIUM 40 MG/1
40 TABLET, FILM COATED ORAL DAILY
Qty: 90 TABLET | Refills: 3 | Status: SHIPPED | OUTPATIENT
Start: 2019-04-23 | End: 2020-04-28

## 2019-04-23 ASSESSMENT — MIFFLIN-ST. JEOR: SCORE: 1630.05

## 2019-04-23 NOTE — LETTER
4/23/2019    Lida Walter, APRN CNP  [address]  [facility] [address]    RE: Alfa Schaefferon       Dear Colleague,    I had the pleasure of seeing Alfa Cm in the [facility].    HISTORY:    Alfa Cm is a very pleasant 58-year-old male who is unaccompanied today.  He has a history of coronary artery disease, having first undergone stenting of his RCA in 2012 and then presenting late with a STEMI in September 2016.  On the second occasion his troponin riana to greater than 200 and an echocardiogram showed an ejection fraction of 30-35% with RV dysfunction and mild to moderate pulmonary hypertension.    At our last visit in May 2018 (the patient has missed 2 appointments since then) Alfa reported that he was doing well and that his energy level was fine but his wife, who accompanied him at that time, reported that he was very short of breath with activity.  Today Alfa once again reports that he feels that his energy level is normal.  He is able to walk up a flight of steps without difficulty.  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  He denies specifically any exertional chest, arm, neck, or jaw discomfort as well as symptoms of syncope/near syncope, PND/orthopnea, orthostasis, palpitations, peripheral edema, or claudication.  He also reports to me that he has given up alcohol entirely, previously drinking 2-4 drinks per day.  He continues to smoke half a pack per day.    Today's examination is essentially normal.  Today's ECG shows sinus rhythm with inferior Q waves, no widening of the QRS or left bundle branch block is present.      ASSESSMENT/PLAN:    1.  Coronary artery disease.  History of large previous inferior MI with last ejection fraction estimated to be 30-35%.  At our last visit he has had pressure was on the low side and we were unable to  titrate his medications upward.  Today his blood pressure is a little higher so I am increasing his Coreg to 12.5 mg twice daily.  He is using lisinopril 20 mg/day as well as aspirin, Lasix, and atorvastatin.  Most recent metabolic profile, done about 6 weeks ago, was normal.  His ejection fraction is low enough that he would be a candidate for ICD implantation.  I will have an echocardiogram repeated after we have titrated his Coreg upward and decide about an ICD.  I spoke with him about this possibility extensively and he is interested.  2.  Cigarette use.  Smoking cessation is again encouraged.  3.  History of CHF.  No CHF on exam and the patient admits to no symptoms consistent with CHF, currently class I-II.  4.  Ischemic cardiomyopathy.  See notes above.    Thank you for inviting me to participate in your patient's care.  Please do not hesitate to call if I can be of further assistance.      Orders Placed This Encounter   Procedures     Follow-Up with Cardiologist     EKG 12-lead complete w/read - Clinics (performed today)     Echocardiogram Complete     Orders Placed This Encounter   Medications     atorvastatin (LIPITOR) 40 MG tablet     Sig: Take 1 tablet (40 mg) by mouth daily     Dispense:  90 tablet     Refill:  3     DISCONTD: pantoprazole (PROTONIX) 40 MG EC tablet     Sig: Take 1 tablet (40 mg) by mouth every morning     Dispense:  90 tablet     Refill:  3     lisinopril (PRINIVIL/ZESTRIL) 20 MG tablet     Sig: Take 1 tablet (20 mg) by mouth daily     Dispense:  90 tablet     Refill:  0     carvedilol (COREG) 12.5 MG tablet     Sig: Take 1 tablet (12.5 mg) by mouth 2 times daily (with meals)     Dispense:  90 tablet     Refill:  3     Medications Discontinued During This Encounter   Medication Reason     atorvastatin (LIPITOR) 40 MG tablet Reorder     pantoprazole (PROTONIX) 40 MG EC tablet Reorder     lisinopril (PRINIVIL/ZESTRIL) 20 MG tablet Reorder     carvedilol (COREG) 6.25 MG tablet       pantoprazole (PROTONIX) 40 MG EC tablet Therapy completed       10 year ASCVD risk: The ASCVD Risk score (Bristolshawn MINA Jr., et al., 2013) failed to calculate for the following reasons:    The patient has a prior MI or stroke diagnosis    Encounter Diagnoses   Name Primary?     GUNNAR (obstructive sleep apnea)      Ischemic cardiomyopathy      Coronary artery disease involving native coronary artery of native heart without angina pectoris      Gastroesophageal reflux disease, esophagitis presence not specified      Benign essential hypertension      Chronic systolic congestive heart failure (H)        CURRENT MEDICATIONS:  Current Outpatient Medications   Medication Sig Dispense Refill     albuterol (2.5 MG/3ML) 0.083% neb solution Take 1 vial (2.5 mg) by nebulization every 6 hours as needed for shortness of breath / dyspnea or wheezing 1 Box 1     albuterol (PROAIR HFA/PROVENTIL HFA/VENTOLIN HFA) 108 (90 BASE) MCG/ACT Inhaler Inhale 1-2 puffs into the lungs every 6 hours as needed for shortness of breath / dyspnea or wheezing 1 Inhaler 5     alcohol swab prep pads Use to swab area of injection/kylee as directed. 100 each 3     aspirin 81 MG tablet Take 1 tablet (81 mg) by mouth daily       atorvastatin (LIPITOR) 40 MG tablet Take 1 tablet (40 mg) by mouth daily 90 tablet 3     blood glucose calibration (NO BRAND SPECIFIED) solution Use to calibrate blood glucose monitor as needed as directed. 1 Bottle 3     blood glucose monitoring (NO BRAND SPECIFIED) meter device kit Use to test blood sugar as directed. 1 kit 0     blood glucose monitoring (NO BRAND SPECIFIED) test strip Use to test blood sugar 4 times daily or as directed. 100 strip 6     carvedilol (COREG) 12.5 MG tablet Take 1 tablet (12.5 mg) by mouth 2 times daily (with meals) 90 tablet 3     Fexofenadine HCl (ALLEGRA ALLERGY PO)        furosemide (LASIX) 40 MG tablet Take 1 tablet (40 mg) by mouth 2 times daily 180 tablet 0     hydrOXYzine (ATARAX) 25 MG tablet Take  2 tablets (50 mg) by mouth every 6 hours as needed for itching or anxiety 30 tablet 0     lisinopril (PRINIVIL/ZESTRIL) 20 MG tablet Take 1 tablet (20 mg) by mouth daily 90 tablet 0     LORazepam (ATIVAN) 1 MG tablet Take 0.5-1 tablets (0.5-1 mg) by mouth 3 times daily as needed for anxiety or withdrawal 15 tablet 0     multivitamin, therapeutic (THERA-VIT) TABS tablet Take 1 tablet by mouth daily       nicotine polacrilex ([employer] NICOTINE POLACRILEX) 2 MG gum Place 1 each (2 mg) inside cheek as needed for smoking cessation 100 tablet 1     order for DME Equipment being ordered: Other: Nebulizer machine  Treatment Diagnosis: Pneumonia, reactive airway 1 Device 0     thin (NO BRAND SPECIFIED) lancets Use with lanceting device. 100 each 1       ALLERGIES     Allergies   Allergen Reactions     Pollen Extract        PAST MEDICAL HISTORY:  Past Medical History:   Diagnosis Date     Alcohol abuse      Coronary artery disease involving native coronary artery without angina pectoris 2011    angiplasty, stent placed at [facility]     Hyperlipidemia      Hypertension      Myocardial infarction (H)      Substance abuse (H)      Tobacco abuse        PAST SURGICAL HISTORY:  Past Surgical History:   Procedure Laterality Date     LA PATIENT HAS A CORONARY ARTERY STENT         FAMILY HISTORY:  Family History   Problem Relation Age of Onset     Diabetes Mother        SOCIAL HISTORY:  Social History     Socioeconomic History     Marital status:      Spouse name: None     Number of children: None     Years of education: None     Highest education level: None   Occupational History     None   Social Needs     Financial resource strain: None     Food insecurity:     Worry: None     Inability: None     Transportation needs:     Medical: None     Non-medical: None   Tobacco Use     Smoking status: Current Every Day Smoker     Packs/day: 0.50     Types: Cigarettes     Smokeless tobacco: Never Used     Tobacco comment:  "quit date 10/16/17   Substance and Sexual Activity     Alcohol use: Yes     Alcohol/week: 12.0 oz     Types: 20 Cans of beer per week     Drug use: No     Sexual activity: Never   Lifestyle     Physical activity:     Days per week: None     Minutes per session: None     Stress: None   Relationships     Social connections:     Talks on phone: None     Gets together: None     Attends Orthodox service: None     Active member of club or organization: None     Attends meetings of clubs or organizations: None     Relationship status: None     Intimate partner violence:     Fear of current or ex partner: None     Emotionally abused: None     Physically abused: None     Forced sexual activity: None   Other Topics Concern     None   Social History Narrative     None       Review of Systems:  Skin:  Negative     Eyes:  Positive for glasses  ENT:  Negative    Respiratory:  Negative    Cardiovascular:  Negative    Gastroenterology: Positive for reflux  Genitourinary:  Negative    Musculoskeletal:  Negative    Neurologic:  Negative    Psychiatric:  Negative    Heme/Lymph/Imm:  Negative    Endocrine:  Negative      Physical Exam:  Vitals: /90 (BP Location: Right arm, Patient Position: Sitting, Cuff Size: Adult Regular)   Pulse 81   Ht 1.727 m (5' 8\")   Wt 82.6 kg (182 lb)   SpO2 97%   BMI 27.67 kg/m       Constitutional:  cooperative;well developed overweight      Skin:  warm and dry to the touch        Head:  normocephalic        Eyes:  pupils equal and round        ENT:  no pallor or cyanosis        Neck:  JVP normal        Chest:  clear to auscultation;normal respiratory excursion        Cardiac: regular rhythm, normal S1/S2, no S3 or S4, apical impulse not displaced, no murmurs, gallops or rubs                  Abdomen:  abdomen soft;BS normoactive        Vascular: pulses full and equal                                      Extremities and Back:  no edema        Neurological:  no gross motor deficits    "       Recent Lab Results:  LIPID RESULTS:  Lab Results   Component Value Date    CHOL 155 10/31/2017    HDL 47 10/31/2017    LDL 87 10/31/2017    TRIG 105 10/31/2017       LIVER ENZYME RESULTS:  Lab Results   Component Value Date     (H) 03/04/2019     (H) 03/04/2019       CBC RESULTS:  Lab Results   Component Value Date    WBC 5.4 03/04/2019    RBC 4.06 (L) 03/04/2019    HGB 13.7 03/04/2019    HCT 40.2 03/04/2019    MCV 99 03/04/2019    MCH 33.7 (H) 03/04/2019    MCHC 34.1 03/04/2019    RDW 15.2 (H) 03/04/2019     03/04/2019       BMP RESULTS:  Lab Results   Component Value Date     03/04/2019    POTASSIUM 3.6 03/04/2019    CHLORIDE 103 03/04/2019    CO2 23 03/04/2019    ANIONGAP 11 03/04/2019     (H) 03/04/2019    BUN 5 (L) 03/04/2019    CR 0.70 03/04/2019    GFRESTIMATED >90 03/04/2019    GFRESTBLACK >90 03/04/2019    GRANT 8.7 03/04/2019        A1C RESULTS:  Lab Results   Component Value Date    A1C 5.6 10/11/2017       INR RESULTS:  Lab Results   Component Value Date    INR 1.06 03/04/2019    INR 1.22 (H) 09/30/2016         Darshan Swanson MD, FAC    CC  Lida Walter, APRN CNP  [address] [place] [address]                    Thank you for allowing me to participate in the care of your patient.      Sincerely,     Darshan Swanson MD     [facility]

## 2019-04-23 NOTE — PROGRESS NOTES
HISTORY:    Alfa Cm is a very pleasant 58-year-old male who is unaccompanied today.  He has a history of coronary artery disease, having first undergone stenting of his RCA in 2012 and then presenting late with a STEMI in September 2016.  On the second occasion his troponin riana to greater than 200 and an echocardiogram showed an ejection fraction of 30-35% with RV dysfunction and mild to moderate pulmonary hypertension.    At our last visit in May 2018 (the patient has missed 2 appointments since then) Alfa reported that he was doing well and that his energy level was fine but his wife, who accompanied him at that time, reported that he was very short of breath with activity.  Today Alfa once again reports that he feels that his energy level is normal.  He is able to walk up a flight of steps without difficulty.  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  He denies specifically any exertional chest, arm, neck, or jaw discomfort as well as symptoms of syncope/near syncope, PND/orthopnea, orthostasis, palpitations, peripheral edema, or claudication.  He also reports to me that he has given up alcohol entirely, previously drinking 2-4 drinks per day.  He continues to smoke half a pack per day.    Today's examination is essentially normal.  Today's ECG shows sinus rhythm with inferior Q waves, no widening of the QRS or left bundle branch block is present.      ASSESSMENT/PLAN:    1.  Coronary artery disease.  History of large previous inferior MI with last ejection fraction estimated to be 30-35%.  At our last visit he has had pressure was on the low side and we were unable to titrate his medications upward.  Today his blood pressure is a little higher so I am increasing his Coreg to 12.5 mg twice daily.  He is using lisinopril 20 mg/day as well as aspirin, Lasix, and atorvastatin.  Most recent metabolic profile, done  about 6 weeks ago, was normal.  His ejection fraction is low enough that he would be a candidate for ICD implantation.  I will have an echocardiogram repeated after we have titrated his Coreg upward and decide about an ICD.  I spoke with him about this possibility extensively and he is interested.  2.  Cigarette use.  Smoking cessation is again encouraged.  3.  History of CHF.  No CHF on exam and the patient admits to no symptoms consistent with CHF, currently class I-II.  4.  Ischemic cardiomyopathy.  See notes above.    Thank you for inviting me to participate in your patient's care.  Please do not hesitate to call if I can be of further assistance.      Orders Placed This Encounter   Procedures     Follow-Up with Cardiologist     EKG 12-lead complete w/read - Clinics (performed today)     Echocardiogram Complete     Orders Placed This Encounter   Medications     atorvastatin (LIPITOR) 40 MG tablet     Sig: Take 1 tablet (40 mg) by mouth daily     Dispense:  90 tablet     Refill:  3     DISCONTD: pantoprazole (PROTONIX) 40 MG EC tablet     Sig: Take 1 tablet (40 mg) by mouth every morning     Dispense:  90 tablet     Refill:  3     lisinopril (PRINIVIL/ZESTRIL) 20 MG tablet     Sig: Take 1 tablet (20 mg) by mouth daily     Dispense:  90 tablet     Refill:  0     carvedilol (COREG) 12.5 MG tablet     Sig: Take 1 tablet (12.5 mg) by mouth 2 times daily (with meals)     Dispense:  90 tablet     Refill:  3     Medications Discontinued During This Encounter   Medication Reason     atorvastatin (LIPITOR) 40 MG tablet Reorder     pantoprazole (PROTONIX) 40 MG EC tablet Reorder     lisinopril (PRINIVIL/ZESTRIL) 20 MG tablet Reorder     carvedilol (COREG) 6.25 MG tablet      pantoprazole (PROTONIX) 40 MG EC tablet Therapy completed       10 year ASCVD risk: The ASCVD Risk score (Schlater DC Jr., et al., 2013) failed to calculate for the following reasons:    The patient has a prior MI or stroke diagnosis    Encounter Diagnoses    Name Primary?     GUNNAR (obstructive sleep apnea)      Ischemic cardiomyopathy      Coronary artery disease involving native coronary artery of native heart without angina pectoris      Gastroesophageal reflux disease, esophagitis presence not specified      Benign essential hypertension      Chronic systolic congestive heart failure (H)        CURRENT MEDICATIONS:  Current Outpatient Medications   Medication Sig Dispense Refill     albuterol (2.5 MG/3ML) 0.083% neb solution Take 1 vial (2.5 mg) by nebulization every 6 hours as needed for shortness of breath / dyspnea or wheezing 1 Box 1     albuterol (PROAIR HFA/PROVENTIL HFA/VENTOLIN HFA) 108 (90 BASE) MCG/ACT Inhaler Inhale 1-2 puffs into the lungs every 6 hours as needed for shortness of breath / dyspnea or wheezing 1 Inhaler 5     alcohol swab prep pads Use to swab area of injection/kylee as directed. 100 each 3     aspirin 81 MG tablet Take 1 tablet (81 mg) by mouth daily       atorvastatin (LIPITOR) 40 MG tablet Take 1 tablet (40 mg) by mouth daily 90 tablet 3     blood glucose calibration (NO BRAND SPECIFIED) solution Use to calibrate blood glucose monitor as needed as directed. 1 Bottle 3     blood glucose monitoring (NO BRAND SPECIFIED) meter device kit Use to test blood sugar as directed. 1 kit 0     blood glucose monitoring (NO BRAND SPECIFIED) test strip Use to test blood sugar 4 times daily or as directed. 100 strip 6     carvedilol (COREG) 12.5 MG tablet Take 1 tablet (12.5 mg) by mouth 2 times daily (with meals) 90 tablet 3     Fexofenadine HCl (ALLEGRA ALLERGY PO)        furosemide (LASIX) 40 MG tablet Take 1 tablet (40 mg) by mouth 2 times daily 180 tablet 0     hydrOXYzine (ATARAX) 25 MG tablet Take 2 tablets (50 mg) by mouth every 6 hours as needed for itching or anxiety 30 tablet 0     lisinopril (PRINIVIL/ZESTRIL) 20 MG tablet Take 1 tablet (20 mg) by mouth daily 90 tablet 0     LORazepam (ATIVAN) 1 MG tablet Take 0.5-1 tablets (0.5-1 mg) by  mouth 3 times daily as needed for anxiety or withdrawal 15 tablet 0     multivitamin, therapeutic (THERA-VIT) TABS tablet Take 1 tablet by mouth daily       nicotine polacrilex ([employer] NICOTINE POLACRILEX) 2 MG gum Place 1 each (2 mg) inside cheek as needed for smoking cessation 100 tablet 1     order for DME Equipment being ordered: Other: Nebulizer machine  Treatment Diagnosis: Pneumonia, reactive airway 1 Device 0     thin (NO BRAND SPECIFIED) lancets Use with lanceting device. 100 each 1       ALLERGIES     Allergies   Allergen Reactions     Pollen Extract        PAST MEDICAL HISTORY:  Past Medical History:   Diagnosis Date     Alcohol abuse      Coronary artery disease involving native coronary artery without angina pectoris 2011    angiplasty, stent placed at [facility]     Hyperlipidemia      Hypertension      Myocardial infarction (H)      Substance abuse (H)      Tobacco abuse        PAST SURGICAL HISTORY:  Past Surgical History:   Procedure Laterality Date     MD PATIENT HAS A CORONARY ARTERY STENT         FAMILY HISTORY:  Family History   Problem Relation Age of Onset     Diabetes Mother        SOCIAL HISTORY:  Social History     Socioeconomic History     Marital status:      Spouse name: None     Number of children: None     Years of education: None     Highest education level: None   Occupational History     None   Social Needs     Financial resource strain: None     Food insecurity:     Worry: None     Inability: None     Transportation needs:     Medical: None     Non-medical: None   Tobacco Use     Smoking status: Current Every Day Smoker     Packs/day: 0.50     Types: Cigarettes     Smokeless tobacco: Never Used     Tobacco comment: quit date 10/16/17   Substance and Sexual Activity     Alcohol use: Yes     Alcohol/week: 12.0 oz     Types: 20 Cans of beer per week     Drug use: No     Sexual activity: Never   Lifestyle     Physical activity:     Days per week: None     Minutes per  "session: None     Stress: None   Relationships     Social connections:     Talks on phone: None     Gets together: None     Attends [employer] service: None     Active member of club or organization: None     Attends meetings of clubs or organizations: None     Relationship status: None     Intimate partner violence:     Fear of current or ex partner: None     Emotionally abused: None     Physically abused: None     Forced sexual activity: None   Other Topics Concern     None   Social History Narrative     None       Review of Systems:  Skin:  Negative     Eyes:  Positive for glasses  ENT:  Negative    Respiratory:  Negative    Cardiovascular:  Negative    Gastroenterology: Positive for reflux  Genitourinary:  Negative    Musculoskeletal:  Negative    Neurologic:  Negative    Psychiatric:  Negative    Heme/Lymph/Imm:  Negative    Endocrine:  Negative      Physical Exam:  Vitals: /90 (BP Location: Right arm, Patient Position: Sitting, Cuff Size: Adult Regular)   Pulse 81   Ht 1.727 m (5' 8\")   Wt 82.6 kg (182 lb)   SpO2 97%   BMI 27.67 kg/m      Constitutional:  cooperative;well developed overweight      Skin:  warm and dry to the touch        Head:  normocephalic        Eyes:  pupils equal and round        ENT:  no pallor or cyanosis        Neck:  JVP normal        Chest:  clear to auscultation;normal respiratory excursion        Cardiac: regular rhythm, normal S1/S2, no S3 or S4, apical impulse not displaced, no murmurs, gallops or rubs                  Abdomen:  abdomen soft;BS normoactive        Vascular: pulses full and equal                                      Extremities and Back:  no edema        Neurological:  no gross motor deficits          Recent Lab Results:  LIPID RESULTS:  Lab Results   Component Value Date    CHOL 155 10/31/2017    HDL 47 10/31/2017    LDL 87 10/31/2017    TRIG 105 10/31/2017       LIVER ENZYME RESULTS:  Lab Results   Component Value Date     (H) 03/04/2019    ALT " 103 (H) 03/04/2019       CBC RESULTS:  Lab Results   Component Value Date    WBC 5.4 03/04/2019    RBC 4.06 (L) 03/04/2019    HGB 13.7 03/04/2019    HCT 40.2 03/04/2019    MCV 99 03/04/2019    MCH 33.7 (H) 03/04/2019    MCHC 34.1 03/04/2019    RDW 15.2 (H) 03/04/2019     03/04/2019       BMP RESULTS:  Lab Results   Component Value Date     03/04/2019    POTASSIUM 3.6 03/04/2019    CHLORIDE 103 03/04/2019    CO2 23 03/04/2019    ANIONGAP 11 03/04/2019     (H) 03/04/2019    BUN 5 (L) 03/04/2019    CR 0.70 03/04/2019    GFRESTIMATED >90 03/04/2019    GFRESTBLACK >90 03/04/2019    GRANT 8.7 03/04/2019        A1C RESULTS:  Lab Results   Component Value Date    A1C 5.6 10/11/2017       INR RESULTS:  Lab Results   Component Value Date    INR 1.06 03/04/2019    INR 1.22 (H) 09/30/2016         Darshan Swanson MD, FACC    CC  Lida Walter, APRN CNP  [address] [place] [address]

## 2019-08-05 DIAGNOSIS — I25.10 CORONARY ARTERY DISEASE INVOLVING NATIVE CORONARY ARTERY OF NATIVE HEART WITHOUT ANGINA PECTORIS: ICD-10-CM

## 2019-08-05 DIAGNOSIS — I10 BENIGN ESSENTIAL HYPERTENSION: ICD-10-CM

## 2019-08-05 RX ORDER — LISINOPRIL 20 MG/1
20 TABLET ORAL DAILY
Qty: 90 TABLET | Refills: 2 | Status: SHIPPED | OUTPATIENT
Start: 2019-08-05 | End: 2021-03-10

## 2019-08-20 ENCOUNTER — TELEPHONE (OUTPATIENT)
Dept: SCHEDULING | Facility: CLINIC | Age: 59
End: 2019-08-20

## 2019-08-20 NOTE — TELEPHONE ENCOUNTER
8/20/2019    Call Regarding Preventive Health Screening Colonoscopy    Attempt 1    Comments:     LIZA

## 2020-03-25 ENCOUNTER — TELEPHONE (OUTPATIENT)
Dept: CARDIOLOGY | Facility: CLINIC | Age: 60
End: 2020-03-25

## 2020-03-25 NOTE — TELEPHONE ENCOUNTER
Received a call from patient. Patient is requesting Rx for Flonase and Albuterol.    Patient was informed that he will need to get his refills from his PMD.       Alayna RN, BSN  [facility]

## 2020-03-26 DIAGNOSIS — J98.01 POST-INFECTION BRONCHOSPASM: ICD-10-CM

## 2020-03-26 RX ORDER — ALBUTEROL SULFATE 90 UG/1
1-2 AEROSOL, METERED RESPIRATORY (INHALATION) EVERY 6 HOURS PRN
Qty: 1 INHALER | Refills: 5 | OUTPATIENT
Start: 2020-03-26

## 2020-03-26 RX ORDER — ALBUTEROL SULFATE 0.83 MG/ML
2.5 SOLUTION RESPIRATORY (INHALATION) EVERY 6 HOURS PRN
Qty: 1 BOX | Refills: 1 | OUTPATIENT
Start: 2020-03-26

## 2020-03-26 NOTE — TELEPHONE ENCOUNTER
Pt was last seen with [facility] needs to follow-up with them. No albuterol RX since 2018    Lianne Sanchez RN

## 2020-03-26 NOTE — TELEPHONE ENCOUNTER
Medication Question or Refill  Who is calling: Patient  What medication are you calling about (include dose and sig)?: albuterol (PROAIR HFA/PROVENTIL HFA/VENTOLIN HFA) 108 (90 BASE) MCG/ACT Inhaler and albuterol (2.5 MG/3ML) 0.083% neb solution  Controlled Substance Agreement on file: No  Who prescribed the medication?: Tulio Arango  Do you need a refill? Yes: pt has only been seen here a couple of times, it looks like the last time he was here he received a refill so that's why he called us for the refill again  When did you use the medication last? Not sure  Patient offered an appointment? No  Do you have any questions or concerns?  No  Requested Pharmacy: [facility] in [place]  Ok to leave a detailed message?: No at Cell number on file:    Telephone Information:   Mobile [phone]       Monika Holloway,

## 2020-04-28 ENCOUNTER — VIRTUAL VISIT (OUTPATIENT)
Dept: CARDIOLOGY | Facility: CLINIC | Age: 60
End: 2020-04-28
Payer: COMMERCIAL

## 2020-04-28 DIAGNOSIS — I50.22 CHRONIC SYSTOLIC CONGESTIVE HEART FAILURE (H): ICD-10-CM

## 2020-04-28 DIAGNOSIS — I25.10 CORONARY ARTERY DISEASE INVOLVING NATIVE CORONARY ARTERY OF NATIVE HEART WITHOUT ANGINA PECTORIS: ICD-10-CM

## 2020-04-28 PROCEDURE — 99213 OFFICE O/P EST LOW 20 MIN: CPT | Mod: 95 | Performed by: INTERNAL MEDICINE

## 2020-04-28 RX ORDER — ATORVASTATIN CALCIUM 40 MG/1
40 TABLET, FILM COATED ORAL DAILY
Qty: 90 TABLET | Refills: 3 | Status: SHIPPED | OUTPATIENT
Start: 2020-04-28 | End: 2021-03-24

## 2020-04-28 RX ORDER — CARVEDILOL 25 MG/1
25 TABLET ORAL 2 TIMES DAILY WITH MEALS
Qty: 180 TABLET | Refills: 3 | Status: SHIPPED | OUTPATIENT
Start: 2020-04-28 | End: 2021-11-08

## 2020-04-28 NOTE — LETTER
4/28/2020      RE: Alfa Cm  [address] [place] [address]       Dear Colleague,    Thank you for the opportunity to participate in the care of your patient, Alfa Cm, at the [facility]. Please see a copy of my visit note below.    Alfa Cm is a 59-year-old male with a history of coronary artery disease.  He underwent stenting of his LAD in 2012 but presented with an inferior MI in 2016 and was found to have extensive clot in his RCA.  He underwent angioplasty without restenting.  He had significant damage from this and was left with an ejection fraction of 30 to 35%.  He is an ongoing smoker not interesting in quitting and has a history of untreated obstructive sleep apnea.  He also had a bout of alcohol dependence since our last visit a year ago.  Our use of cardiac medications has been limited by low blood pressure in the past.    Today Alfa reports that he is doing well.  He denies exertional chest, arm, neck, or jaw discomfort as well as symptoms of PND/orthopnea, palpitations, syncope, strokelike symptoms, peripheral edema, or claudication.  He feels that his energy level is adequate.  He reports that he can walk up a flight of steps without difficulty.    I discussed the patient's medications with him today and I get the distinct impression that he does not really understand what he is doing with his medications.  He was not sure which medications he was on or what dose he was on.  He went and grabbed his models and read them to me but he also reported that he had extra bottles of lisinopril and carvedilol in his medicine chest.  He has not been using his Lipitor since he thought he was told not to.  It does sound like the list of medications that he is supposed to be taking includes aspirin carvedilol and lisinopril, but it is not entirely clear to me that he is taking them  regularly.  His blood pressures in clinic have been running higher than they had been, although he does not have a home blood pressure cuff to check it today.    ASSESSMENT/PLAN:    1.  Ischemic cardiomyopathy, last EF 30 to 35%.  He is not yet on ideal medications, previously limited by hypotension but this does not seem to be a problem anymore.  Today I have increased his dose of carvedilol from 12.5 mg twice daily to 25 mg twice daily.  I reviewed this change with him in a great deal of detail and asked him to repeat it back to me.  Hopefully he understands the change I am asking him to make.  I do not want to make too many changes at once.  He also should be on a higher dose of lisinopril and also he should be on spironolactone.  Our plans were to maximize his medications and then recheck an echo and decide on an ICD.  I once again brought up the question of an ICD and he seems enthusiastic about this possibility.  I will enroll him in our core program to carefully walk him through the further medication changes that will be necessary.  2.  Cigarette use.  Patient continues to smoke, not yet interested in quitting.  3.  Coronary artery disease.  The patient denies symptoms consistent with ischemia.  He had a diseased right coronary artery with late stent thrombosis and is on just aspirin.  I am hoping he is using his aspirin regularly although, as discussed above, he does not seem very certain of what medications he is using and is not using, and I suspect that he grabs a few pills each morning.  I suggested that he organize his pills so that he takes the same medications every day consistently.  My plan was to keep him on Plavix indefinitely but at some point the Plavix has already been stopped.  I did not restart it today.    Thank you for inviting me to participate in your patient's care.  Please do not hesitate to call if I can be of further assistance.    Phone call duration: 19 minutes    Please do not  hesitate to contact me if you have any questions/concerns.     Sincerely,     Darshan Swanson MD

## 2020-04-28 NOTE — PROGRESS NOTES
"Alfa Cm is a 59 year old male who is being evaluated via a billable telephone visit.      The patient has been notified of following:     \"This telephone visit will be conducted via a call between you and your physician/provider. We have found that certain health care needs can be provided without the need for a physical exam.  This service lets us provide the care you need with a short phone conversation.  If a prescription is necessary we can send it directly to your pharmacy.  If lab work is needed we can place an order for that and you can then stop by our lab to have the test done at a later time.    Telephone visits are billed at different rates depending on your insurance coverage. During this emergency period, for some insurers they may be billed the same as an in-person visit.  Please reach out to your insurance provider with any questions.    If during the course of the call the physician/provider feels a telephone visit is not appropriate, you will not be charged for this service.\"    Patient has given verbal consent for Telephone visit?  Yes    How would you like to obtain your AVS? Mail a copy      Patient reported vitals:  BP: n/a  Heart rate: n/a  Weight: 196lb    Review Of Systems  Skin: negative  Eyes: negative  Ears/Nose/Throat: negative  Respiratory: No shortness of breath and No dyspnea on exertion  Cardiovascular: negative  Gastrointestinal: negative  Genitourinary: negative  Musculoskeletal: negative  Neurologic: negative  Psychiatric: negative  Hematologic/Lymphatic/Immunologic: negative  Endocrine: negative  [place] CMA    HISTORY:    Alfa Cm is a 59-year-old male with a history of coronary artery disease.  He underwent stenting of his LAD in 2012 but presented with an inferior MI in 2016 and was found to have extensive clot in his RCA.  He underwent angioplasty without restenting.  He had significant damage from this and was left with an ejection fraction of " 30 to 35%.  He is an ongoing smoker not interesting in quitting and has a history of untreated obstructive sleep apnea.  He also had a bout of alcohol dependence since our last visit a year ago.  Our use of cardiac medications has been limited by low blood pressure in the past.    Today Alfa reports that he is doing well.  He denies exertional chest, arm, neck, or jaw discomfort as well as symptoms of PND/orthopnea, palpitations, syncope, strokelike symptoms, peripheral edema, or claudication.  He feels that his energy level is adequate.  He reports that he can walk up a flight of steps without difficulty.    I discussed the patient's medications with him today and I get the distinct impression that he does not really understand what he is doing with his medications.  He was not sure which medications he was on or what dose he was on.  He went and grabbed his models and read them to me but he also reported that he had extra bottles of lisinopril and carvedilol in his medicine chest.  He has not been using his Lipitor since he thought he was told not to.  It does sound like the list of medications that he is supposed to be taking includes aspirin carvedilol and lisinopril, but it is not entirely clear to me that he is taking them regularly.  His blood pressures in clinic have been running higher than they had been, although he does not have a home blood pressure cuff to check it today.      ASSESSMENT/PLAN:    1.  Ischemic cardiomyopathy, last EF 30 to 35%.  He is not yet on ideal medications, previously limited by hypotension but this does not seem to be a problem anymore.  Today I have increased his dose of carvedilol from 12.5 mg twice daily to 25 mg twice daily.  I reviewed this change with him in a great deal of detail and asked him to repeat it back to me.  Hopefully he understands the change I am asking him to make.  I do not want to make too many changes at once.  He also should be on a higher dose of  lisinopril and also he should be on spironolactone.  Our plans were to maximize his medications and then recheck an echo and decide on an ICD.  I once again brought up the question of an ICD and he seems enthusiastic about this possibility.  I will enroll him in our core program to carefully walk him through the further medication changes that will be necessary.  2.  Cigarette use.  Patient continues to smoke, not yet interested in quitting.  3.  Coronary artery disease.  The patient denies symptoms consistent with ischemia.  He had a diseased right coronary artery with late stent thrombosis and is on just aspirin.  I am hoping he is using his aspirin regularly although, as discussed above, he does not seem very certain of what medications he is using and is not using, and I suspect that he grabs a few pills each morning.  I suggested that he organize his pills so that he takes the same medications every day consistently.  My plan was to keep him on Plavix indefinitely but at some point the Plavix has already been stopped.  I did not restart it today.    Thank you for inviting me to participate in your patient's care.  Please do not hesitate to call if I can be of further assistance.    Phone call duration: 19 minutes    Darshan Swanson MD

## 2020-06-01 ENCOUNTER — VIRTUAL VISIT (OUTPATIENT)
Dept: FAMILY MEDICINE | Facility: CLINIC | Age: 60
End: 2020-06-01
Payer: COMMERCIAL

## 2020-06-01 VITALS — WEIGHT: 196 LBS | BODY MASS INDEX: 30.76 KG/M2 | HEIGHT: 67 IN

## 2020-06-01 DIAGNOSIS — Z53.9 ERRONEOUS ENCOUNTER--DISREGARD: Primary | ICD-10-CM

## 2020-06-01 ASSESSMENT — MIFFLIN-ST. JEOR: SCORE: 1672.68

## 2020-06-01 ASSESSMENT — ANXIETY QUESTIONNAIRES
IF YOU CHECKED OFF ANY PROBLEMS ON THIS QUESTIONNAIRE, HOW DIFFICULT HAVE THESE PROBLEMS MADE IT FOR YOU TO DO YOUR WORK, TAKE CARE OF THINGS AT HOME, OR GET ALONG WITH OTHER PEOPLE: SOMEWHAT DIFFICULT
GAD7 TOTAL SCORE: 1
6. BECOMING EASILY ANNOYED OR IRRITABLE: NOT AT ALL
1. FEELING NERVOUS, ANXIOUS, OR ON EDGE: NOT AT ALL
5. BEING SO RESTLESS THAT IT IS HARD TO SIT STILL: NOT AT ALL
7. FEELING AFRAID AS IF SOMETHING AWFUL MIGHT HAPPEN: NOT AT ALL
2. NOT BEING ABLE TO STOP OR CONTROL WORRYING: NOT AT ALL
3. WORRYING TOO MUCH ABOUT DIFFERENT THINGS: NOT AT ALL

## 2020-06-01 ASSESSMENT — PATIENT HEALTH QUESTIONNAIRE - PHQ9: 5. POOR APPETITE OR OVEREATING: SEVERAL DAYS

## 2020-06-01 NOTE — PROGRESS NOTES
"Alfa Cm is a 59 year old male who is being evaluated via a billable telephone visit.      The patient has been notified of following:     \"This telephone visit will be conducted via a call between you and your physician/provider. We have found that certain health care needs can be provided without the need for a physical exam.  This service lets us provide the care you need with a short phone conversation.  If a prescription is necessary we can send it directly to your pharmacy.  If lab work is needed we can place an order for that and you can then stop by our lab to have the test done at a later time.    Telephone visits are billed at different rates depending on your insurance coverage. During this emergency period, for some insurers they may be billed the same as an in-person visit.  Please reach out to your insurance provider with any questions.    If during the course of the call the physician/provider feels a telephone visit is not appropriate, you will not be charged for this service.\"    Patient has given verbal consent for Telephone visit?  Yes    What phone number would you like to be contacted at? [phone]    How would you like to obtain your AVS? Mail a copy    Subjective     Alfa Cm is a 59 year old male who presents via phone visit today for the following health issues:    HPI  Insomnia      Duration: long time a couples years    Description  Frequency of insomnia:  nightly  Time to fall asleep: 1.5 hours  Middle of night awakening:  nightly  Early morning awakening:  nightly    Accompanying signs and symptoms:  Racing thoughts and snoring     History  Similar episodes in past:  no   Previous evaluation/sleep study:  no     Precipitating or alleviating factors:  New stressful situation: no   Caffeine intake after lunchtime: no   OTC decongestants: no   Any new medications: no - other than med he is already on     Therapies tried and outcome:  Sleep aids      Reviewed and " updated as needed this visit by Provider    Objective   Reported vitals:  There were no vitals taken for this visit.                 Assessment/Plan:      No follow-ups on file.      Phone call duration:  0 minutes    No answer for provider calls x 3.

## 2020-06-02 ASSESSMENT — ANXIETY QUESTIONNAIRES: GAD7 TOTAL SCORE: 1

## 2020-06-05 ENCOUNTER — NURSE TRIAGE (OUTPATIENT)
Dept: NURSING | Facility: CLINIC | Age: 60
End: 2020-06-05

## 2020-06-05 ENCOUNTER — VIRTUAL VISIT (OUTPATIENT)
Dept: FAMILY MEDICINE | Facility: CLINIC | Age: 60
End: 2020-06-05
Payer: COMMERCIAL

## 2020-06-05 VITALS — HEIGHT: 67 IN | WEIGHT: 196 LBS | BODY MASS INDEX: 30.76 KG/M2

## 2020-06-05 DIAGNOSIS — Z53.9 ERRONEOUS ENCOUNTER--DISREGARD: Primary | ICD-10-CM

## 2020-06-05 DIAGNOSIS — G47.00 PERSISTENT INSOMNIA: Primary | ICD-10-CM

## 2020-06-05 DIAGNOSIS — F10.282 ALCOHOL DEPENDENCE WITH ALCOHOL-INDUCED SLEEP DISORDER (H): ICD-10-CM

## 2020-06-05 PROCEDURE — 99213 OFFICE O/P EST LOW 20 MIN: CPT | Mod: 95 | Performed by: FAMILY MEDICINE

## 2020-06-05 RX ORDER — QUETIAPINE FUMARATE 50 MG/1
50 TABLET, FILM COATED ORAL AT BEDTIME
Qty: 30 TABLET | Refills: 1 | Status: SHIPPED | OUTPATIENT
Start: 2020-06-05 | End: 2020-07-07

## 2020-06-05 RX ORDER — DIPHENHYDRAMINE HCL 50 MG
50 CAPSULE ORAL
Qty: 30 CAPSULE | Refills: 1 | Status: SHIPPED | OUTPATIENT
Start: 2020-06-05 | End: 2021-07-20 | Stop reason: ALTCHOICE

## 2020-06-05 ASSESSMENT — PAIN SCALES - GENERAL: PAINLEVEL: NO PAIN (0)

## 2020-06-05 ASSESSMENT — MIFFLIN-ST. JEOR: SCORE: 1672.68

## 2020-06-05 NOTE — PROGRESS NOTES
"Alfa Cm is a 59 year old male who is being evaluated via a billable telephone visit.      The patient has been notified of following:     \"This telephone visit will be conducted via a call between you and your physician/provider. We have found that certain health care needs can be provided without the need for a physical exam.  This service lets us provide the care you need with a short phone conversation.  If a prescription is necessary we can send it directly to your pharmacy.  If lab work is needed we can place an order for that and you can then stop by our lab to have the test done at a later time.    Telephone visits are billed at different rates depending on your insurance coverage. During this emergency period, for some insurers they may be billed the same as an in-person visit.  Please reach out to your insurance provider with any questions.    If during the course of the call the physician/provider feels a telephone visit is not appropriate, you will not be charged for this service.\"    Patient has given verbal consent for Telephone visit?  Yes    What phone number would you like to be contacted at? [phone]    How would you like to obtain your AVS? Mail a copy    Subjective     Alfa Cm is a 59 year old male who presents via phone visit today for the following health issues:    HPI  Pt is having a hard time sleep. He states that he is tired a lot and can't get stuff done because of it. Taking a sleep aid over the counter at home and this was helping. Also prescribed Seroquel in the past for sleep and this helped. Had bad nightmares on Trazodone. Admitted to treatment center and can't take his own over the counter medication. Otherwise doing well.          Vascular Disease Follow-up      How often do you take nitroglycerin? Never    Do you take an aspirin every day? Yes      Patient Active Problem List   Diagnosis     ST elevation myocardial infarction involving right coronary " artery (H)     Hyperlipidemia     Benign essential hypertension     Coronary artery disease involving native coronary artery     Tobacco dependence syndrome     STEMI (ST elevation myocardial infarction) (H)     Sepsis (H)     Coronary artery disease involving native coronary artery without angina pectoris     Iron deficiency anemia, unspecified iron deficiency anemia type     Anemia, unspecified type     Chronic combined systolic and diastolic congestive heart failure (H)     Past Surgical History:   Procedure Laterality Date     WA PATIENT HAS A CORONARY ARTERY STENT         Social History     Tobacco Use     Smoking status: Current Every Day Smoker     Packs/day: 0.50     Types: Cigarettes     Smokeless tobacco: Never Used     Tobacco comment: quit date 10/16/17   Substance Use Topics     Alcohol use: Yes     Alcohol/week: 20.0 standard drinks     Types: 20 Cans of beer per week     Family History   Problem Relation Age of Onset     Diabetes Mother          Current Outpatient Medications   Medication Sig Dispense Refill     albuterol (2.5 MG/3ML) 0.083% neb solution Take 1 vial (2.5 mg) by nebulization every 6 hours as needed for shortness of breath / dyspnea or wheezing 1 Box 1     albuterol (PROAIR HFA/PROVENTIL HFA/VENTOLIN HFA) 108 (90 BASE) MCG/ACT Inhaler Inhale 1-2 puffs into the lungs every 6 hours as needed for shortness of breath / dyspnea or wheezing 1 Inhaler 5     alcohol swab prep pads Use to swab area of injection/kylee as directed. 100 each 3     aspirin 81 MG tablet Take 1 tablet (81 mg) by mouth daily       atorvastatin (LIPITOR) 40 MG tablet Take 1 tablet (40 mg) by mouth daily 90 tablet 3     blood glucose calibration (NO BRAND SPECIFIED) solution Use to calibrate blood glucose monitor as needed as directed. 1 Bottle 3     blood glucose monitoring (NO BRAND SPECIFIED) meter device kit Use to test blood sugar as directed. 1 kit 0     blood glucose monitoring (NO BRAND SPECIFIED) test strip Use  to test blood sugar 4 times daily or as directed. 100 strip 6     carvedilol (COREG) 25 MG tablet Take 1 tablet (25 mg) by mouth 2 times daily (with meals) 180 tablet 3     diphenhydrAMINE (BENADRYL) 50 MG capsule Take 1 capsule (50 mg) by mouth nightly as needed for itching or allergies 30 capsule 1     Fexofenadine HCl (ALLEGRA ALLERGY PO)        furosemide (LASIX) 40 MG tablet Take 1 tablet (40 mg) by mouth 2 times daily 180 tablet 0     hydrOXYzine (ATARAX) 25 MG tablet Take 2 tablets (50 mg) by mouth every 6 hours as needed for itching or anxiety 30 tablet 0     lisinopril (PRINIVIL/ZESTRIL) 20 MG tablet Take 1 tablet (20 mg) by mouth daily 90 tablet 2     multivitamin, therapeutic (THERA-VIT) TABS tablet Take 1 tablet by mouth daily       nicotine polacrilex ([facility] NICOTINE POLACRILEX) 2 MG gum Place 1 each (2 mg) inside cheek as needed for smoking cessation 100 tablet 1     order for DME Equipment being ordered: Other: Nebulizer machine  Treatment Diagnosis: Pneumonia, reactive airway 1 Device 0     QUEtiapine (SEROQUEL) 50 MG tablet Take 1 tablet (50 mg) by mouth At Bedtime 30 tablet 1     thin (NO BRAND SPECIFIED) lancets Use with lanceting device. 100 each 1     Allergies   Allergen Reactions     Pollen Extract      Recent Labs   Lab Test 03/04/19  1243 01/09/19  1125 10/31/17  1302  10/11/17  0510  10/10/17  0520   A1C  --   --   --   --  5.6  --   --    LDL  --   --  87  --   --   --   --    HDL  --   --  47  --   --   --   --    TRIG  --   --  105  --   --   --   --    *  --  21  --   --   --  18   CR 0.70 0.96 0.92   < > 0.70   < > 0.78   GFRESTIMATED >90 88 85   < > >90   < > >90   GFRESTBLACK >90 >90 >90   < > >90   < > >90   POTASSIUM 3.6 3.9 4.2   < > 4.2   < > 3.6    < > = values in this interval not displayed.      BP Readings from Last 3 Encounters:   04/23/19 142/90   03/04/19 (!) 152/103   01/09/19 120/82    Wt Readings from Last 3 Encounters:   06/05/20 88.9 kg (196 lb)   06/01/20 88.9  "kg (196 lb)   04/23/19 82.6 kg (182 lb)                    Reviewed and updated as needed this visit by Provider         Review of Systems   Constitutional, HEENT, cardiovascular, pulmonary, gi and gu systems are negative, except as otherwise noted.       Objective   Reported vitals:  Ht 1.702 m (5' 7\")   Wt 88.9 kg (196 lb)   BMI 30.70 kg/m     healthy, alert and no distress  PSYCH: Alert and oriented times 3; coherent speech, normal   rate and volume, able to articulate logical thoughts, able   to abstract reason, no tangential thoughts, no hallucinations   or delusions  His affect is normal  RESP: No cough, no audible wheezing, able to talk in full sentences  Remainder of exam unable to be completed due to telephone visits    Diagnostic Test Results:  Labs reviewed in Epic  none         Assessment/Plan:  1. Persistent insomnia  Reviewed risks and benefits of different medications considering that he is in treatment for alcoholism. He prefers to restart the Seroquel and use benadryl as needed. Will follow up if this is not helping. Limited supply given. Needs physical exam for more refills.   - QUEtiapine (SEROQUEL) 50 MG tablet; Take 1 tablet (50 mg) by mouth At Bedtime  Dispense: 30 tablet; Refill: 1  - diphenhydrAMINE (BENADRYL) 50 MG capsule; Take 1 capsule (50 mg) by mouth nightly as needed for itching or allergies  Dispense: 30 capsule; Refill: 1    No follow-ups on file.      Phone call duration:  12 minutes    Lizette Lynch MD      "

## 2020-06-05 NOTE — PROGRESS NOTES
"Alfa Cm is a 59 year old male who is being evaluated via a billable telephone visit.      The patient has been notified of following:     \"This telephone visit will be conducted via a call between you and your physician/provider. We have found that certain health care needs can be provided without the need for a physical exam.  This service lets us provide the care you need with a short phone conversation.  If a prescription is necessary we can send it directly to your pharmacy.  If lab work is needed we can place an order for that and you can then stop by our lab to have the test done at a later time.    Telephone visits are billed at different rates depending on your insurance coverage. During this emergency period, for some insurers they may be billed the same as an in-person visit.  Please reach out to your insurance provider with any questions.    If during the course of the call the physician/provider feels a telephone visit is not appropriate, you will not be charged for this service.\"    Patient has given verbal consent for Telephone visit?  Yes    What phone number would you like to be contacted at? [phone]     How would you like to obtain your AVS? Mail a copy    Subjective     Alfa Cm is a 59 year old male who presents via phone visit today for the following health issues:    HPI  Patient would like to start on sleeping pills because he cant sleep at night its been going on for 6 months now. Patient states he started to take OTC sleep aid at least 2 pills a night and was helping but it started not to work anymore.     Called once, no answer  2nd attempt, pt not available.               Reviewed and updated as needed this visit by Provider         Review of Systems          Objective   Reported vitals:  There were no vitals taken for this visit.                 Assessment/Plan:      No follow-ups on file.      Phone call duration: 0  minutes            "

## 2020-06-05 NOTE — PATIENT INSTRUCTIONS
At [facility], we strive to deliver an exceptional experience to you, every time we see you. If you receive a survey, please complete it as we do value your feedback.  If you have MyChart, you can expect to receive results automatically within 24 hours of their completion.  Your provider will send a note interpreting your results as well.   If you do not have MyChart, you should receive your results in about a week by mail.    Your care team:                            Family Medicine Internal Medicine   MD Ry Hagen MD Shantel Branch-Fleming, MD Katya Georgiev PA-C Megan Hill, APRSAMARA Menon, MD Pediatrics   Michael Collins, PAVALERIA Means, MD Evonne Cazares APRN CNP   MD Kathya Estevez MD Deborah Mielke, MD Kim Thein, APRN [facility]      Clinic hours: Monday - Thursday 7 am-7 pm; Fridays 7 am-5 pm.   Urgent care: Monday - Friday 11 am-9 pm; Saturday and Sunday 9 am-5 pm.    Clinic: [phone]       [facility]: Monday - Thursday 8 am - 7 pm; Friday 8 am - 6 pm  [facility]: [phone]     Use [email or web address] for 24/7 diagnosis and treatment of dozens of conditions.

## 2020-06-06 NOTE — TELEPHONE ENCOUNTER
"\"I had a telephone appt today with the doctor and she sent over two RX but the pharmacy didn't get them. \" Called pharmacy, spoke with Lissett, gave verbal for both RX, she will get them out to pt. Notified pt of this.  Herminia Yu RN [facility]    COVID 19 Nurse Triage Plan/Patient Instructions    Please be aware that novel coronavirus (COVID-19) may be circulating in the community. If you develop symptoms such as fever, cough, or SOB or if you have concerns about the presence of another infection including coronavirus (COVID-19), please contact your health care provider or visit [email or web address].     Disposition/Instructions    Additional COVID19 information to add for patients.   How can I protect others?  If you have symptoms (fever, cough, body aches or trouble breathing): Stay home and away from others (self-isolate) until:    At least 10 days have passed since your symptoms started. And     You ve had no fever--and no medicine that reduces fever--for 3 full days (72 hours). And      Your other symptoms have resolved (gotten better).     If you don t have symptoms, but a test showed that you have COVID-19 (you tested positive):    Stay home and away from others (self-isolate) until at least 10 days have passed since the date of your first positive COVID-19 test.    During this time:    Stay in your own room, even for meals. Use your own bathroom if you can.     Stay away from others in your home. No hugging, kissing or shaking hands. No visitors.    Don t go to work, school or anywhere else.     Clean  high touch  surfaces often (doorknobs, counters, handles, etc.). Use a household cleaning spray or wipes. You ll find a full list on the EPA website:  www.epa.gov/pesticide-registration/list-n-disinfectants-use-against-sars-cov-2.    Cover your mouth and nose with a mask, tissue or washcloth to avoid spreading germs.    Wash your hands and face often. Use soap and water.    Caregivers in these " groups are at risk for severe illness due to COVID-19:  o People 65 years and older  o People who live in a nursing home or long-term care facility  o People with chronic disease (lung, heart, cancer, diabetes, kidney, liver, immunologic)  o People who have a weakened immune system, including those who:  - Are in cancer treatment  - Take medicine that weakens the immune system, such as corticosteroids  - Had a bone marrow or organ transplant  - Have an immune deficiency  - Have poorly controlled HIV or AIDS  - Are obese (body mass index of 40 or higher)  - Smoke regularly    Caregivers should wear gloves while washing dishes, handling laundry and cleaning bedrooms and bathrooms.    Use caution when washing and drying laundry: Don t shake dirty laundry, and use the warmest water setting that you can.    For more tips, go to www.cdc.gov/coronavirus/2019-ncov/downloads/10Things.pdf.    How can I take care of myself?  1. Get lots of rest. Drink extra fluids (unless a doctor has told you not to).     2. Take Tylenol (acetaminophen) for fever or pain. If you have liver or kidney problems, ask your family doctor if it s okay to take Tylenol.     Adults can take either:     650 mg (two 325 mg pills) every 4 to 6 hours, or     1,000 mg (two 500 mg pills) every 8 hours as needed.     Note: Don t take more than 3,000 mg in one day.   Acetaminophen is found in many medicines (both prescribed and over-the-counter medicines). Read all labels to be sure you don t take too much.     For children, check the Tylenol bottle for the right dose. The dose is based on the child s age or weight.    3. If you have other health problems (like cancer, heart failure, an organ transplant or severe kidney disease): Call your specialty clinic if you don t feel better in the next 2 days.    4. Know when to call 911: Emergency warning signs include:    Trouble breathing or shortness of breath    Pain or pressure in the chest that doesn t go  away    Feeling confused like you haven t felt before, or not being able to wake up    Bluish-colored lips or face    What are the symptoms of COVID-19?     The most common symptoms are cough, fever and trouble breathing.     Less common symptoms include body aches, chills, diarrhea (loose, watery poops), fatigue (feeling very tired), headache, runny nose, sore throat and loss of smell.    COVID-19 can cause severe coughing (bronchitis) and lung infection (pneumonia).    How does it spread?     The virus may spread when a person coughs or sneezes into the air. The virus can travel about 6 feet this way, and it can live on surfaces.      Common  (household disinfectants) will kill the virus.    Who is at risk?  Anyone can catch COVID-19 if they re around someone who has the virus.    How can others protect themselves?     Stay away from people who have COVID-19 (or symptoms of COVID-19).    Wash hands often with soap and water. Or, use hand  with at least 60% alcohol.    Avoid touching the eyes, nose or mouth.     Wear a face mask when you go out in public, when sick or when caring for a sick person.    Where can I get more information?    [facility]: About COVID-19: [email or web address]    CDC: What to Do If You re Sick: www.cdc.gov/coronavirus/2019-ncov/about/steps-when-sick.html    CDC: Ending Home Isolation: www.cdc.gov/coronavirus/2019-ncov/hcp/disposition-in-home-patients.html     CDC: Caring for Someone: www.cdc.gov/coronavirus/2019-ncov/if-you-are-sick/care-for-someone.html     [facility]: Interim Guidance for Hospital Discharge to Home: www.health.[facility].[place]/diseases/coronavirus/hcp/hospdischarge.pdf    [facility] clinical trials (COVID-19 research studies): clinicalaffairs.[facility]/umn-clinical-trials     Below are the COVID-19 hotlines at the [place] [facility] of [facility] ([facility]). Interpreters are available.   o For health questions: Call 791-602-2196 or  1-964.597.8499 (7 a.m. to 7 p.m.)  o For questions about schools and childcare: Call 316-636-3367 or 1-962.257.5528 (7 a.m. to 7 p.m.)          Thank you for taking steps to prevent the spread of this virus.  o Limit your contact with others.  o Wear a simple mask to cover your cough.  o Wash your hands well and often.    Resources    [facility]: About COVID-19: [email or web address]    CDC: What to Do If You're Sick: www.cdc.gov/coronavirus/2019-ncov/about/steps-when-sick.html    CDC: Ending Home Isolation: www.cdc.gov/coronavirus/2019-ncov/hcp/disposition-in-home-patients.html     CDC: Caring for Someone: www.cdc.gov/coronavirus/2019-ncov/if-you-are-sick/care-for-someone.html     [facility]: Interim Guidance for Hospital Discharge to Home: www.[facility].mn./diseases/coronavirus/hcp/hospdischarge.pdf    [facility] clinical trials (COVID-19 research studies): clinicalaffairs.[facility]-clinical-trials     Below are the COVID-19 hotlines at the [place] Department [facility] ([facility]). Interpreters are available.   o For health questions: Call 622-721-3572 or 1-842.530.9779 (7 a.m. to 7 p.m.)  o For questions about schools and childcare: Call 353-041-4488 or 1-254.166.4365 (7 a.m. to 7 p.m.)

## 2020-07-02 DIAGNOSIS — G47.00 PERSISTENT INSOMNIA: ICD-10-CM

## 2020-07-02 RX ORDER — QUETIAPINE FUMARATE 50 MG/1
50 TABLET, FILM COATED ORAL AT BEDTIME
Qty: 30 TABLET | Refills: 1 | OUTPATIENT
Start: 2020-07-02

## 2020-07-02 NOTE — TELEPHONE ENCOUNTER
Patient has refills remaining with requesting pharmacy.  Farida MITCHELL - Registered Nurse  [facility]  Acute and Diagnostic Services

## 2020-07-02 NOTE — TELEPHONE ENCOUNTER
Medication Question or Refill  Who is calling: Patient   What medication are you calling about (include dose and sig)?:   QUEtiapine (SEROQUEL) 50 MG tablet  30 tablet  1  6/5/2020   --    Sig - Route: Take 1 tablet (50 mg) by mouth At Bedtime - Oral        Controlled Substance Agreement on file: No  Who prescribed the medication?: Lizette Lynch  Do you need a refill? Yes:   When did you use the medication last? na  Patient offered an appointment? No  Do you have any questions or concerns?  No- I did explain to the pt that this rx was not done by Kayleen Briones, he stated she prescribed it to him in the past.   Requested [facility]: [facility]   Okay to leave a detailed message?: No at Cell number on file:    Telephone Information:   Mobile [phone]

## 2020-07-03 NOTE — TELEPHONE ENCOUNTER
Pt would like the refill to be filled at the [facility] in [place]. He no longer uses the pharmacy that has the refill.

## 2020-07-07 RX ORDER — QUETIAPINE FUMARATE 50 MG/1
50 TABLET, FILM COATED ORAL AT BEDTIME
Qty: 30 TABLET | Refills: 0 | Status: SHIPPED | OUTPATIENT
Start: 2020-07-07 | End: 2020-08-03

## 2020-07-07 NOTE — TELEPHONE ENCOUNTER
Patient requesting switch from [facility] pharmacy to [facility] pharmacy for seroquel prescription. Called [facility] pharmacy and canceled current refill of seroquel on file. Approved refill of seroquel per [facility] protocol for pharmacy switch to [facility] in [place].     Urbano KUO RN

## 2020-07-07 NOTE — TELEPHONE ENCOUNTER
Pt knows that he has refills but would like [facility] [address] in [place]. Please reach out when this is filled. [phone]  clementina Simmsshawn Patient Representative

## 2020-07-30 DIAGNOSIS — G47.00 PERSISTENT INSOMNIA: ICD-10-CM

## 2020-08-03 RX ORDER — QUETIAPINE FUMARATE 50 MG/1
TABLET, FILM COATED ORAL
Qty: 30 TABLET | Refills: 3 | Status: SHIPPED | OUTPATIENT
Start: 2020-08-03 | End: 2021-01-04

## 2020-08-03 NOTE — TELEPHONE ENCOUNTER
Routing refill request to provider for review/approval because:  Labs not current:  Blood pressure, lipids, A1c, CBC

## 2020-10-28 ENCOUNTER — OFFICE VISIT (OUTPATIENT)
Dept: PODIATRY | Facility: CLINIC | Age: 60
End: 2020-10-28
Payer: COMMERCIAL

## 2020-10-28 VITALS
WEIGHT: 197 LBS | SYSTOLIC BLOOD PRESSURE: 120 MMHG | DIASTOLIC BLOOD PRESSURE: 72 MMHG | HEIGHT: 67 IN | BODY MASS INDEX: 30.92 KG/M2

## 2020-10-28 DIAGNOSIS — L60.3 DYSTROPHIC NAIL: ICD-10-CM

## 2020-10-28 DIAGNOSIS — M79.671 RIGHT FOOT PAIN: Primary | ICD-10-CM

## 2020-10-28 DIAGNOSIS — F17.200 TOBACCO DEPENDENCE SYNDROME: ICD-10-CM

## 2020-10-28 PROCEDURE — 11730 AVULSION NAIL PLATE SIMPLE 1: CPT | Mod: T6 | Performed by: PODIATRIST

## 2020-10-28 PROCEDURE — 99203 OFFICE O/P NEW LOW 30 MIN: CPT | Mod: 25 | Performed by: PODIATRIST

## 2020-10-28 ASSESSMENT — MIFFLIN-ST. JEOR: SCORE: 1672.22

## 2020-10-28 NOTE — PATIENT INSTRUCTIONS
Thank you for choosing [facility] Podiatry / Foot & Ankle Surgery!    [facility] SCHEDULE SURGERY: [phone]   [address] BILLING QUESTIONS: [phone]   [place] [address] AFTER HOURS: [phone]   PH: [phone] CONSUMER DANIELLE LINE:[phone]   FAX: [phone] APPOINTMENTS: [phone]     INGROWN TOENAILS  When a toenail is ingrown, it is curved and grows into the skin, usually at the nail borders (the sides of the nail). This  digging in  of the nail irritates the skin, often creating pain, redness, swelling, and warmth in the toe.  If an ingrown nail causes a break in the skin, bacteria may enter and cause an infection in the area, which is often marked by drainage and a foul odor. However, even if the toe isn t painful, red, swollen, or warm, a nail that curves downward into the skin can progress to an infection.  CAUSES:  Heredity: In many people, the tendency for ingrown toenails is inherited.   Trauma: Sometimes an ingrown toenail is the result of trauma, such as stubbing your toe, having an object fall on your toe, or engaging in activities that involve repeated pressure on the toes, such as kicking or running.   Improper Trimming:  The most common cause of ingrown toenails is cutting your nails too short. This encourages the skin next to the nail to fold over the nail.   Improperly Sized Footwear: Ingrown toenails can result from wearing socks and shoes that are tight or short.   Nail Conditions: Ingrown toenails can be caused by nail problems, such as fungal infections or losing a nail due to trauma.   TREATMENT: Sometimes initial treatment for ingrown toenails can be safely performed at home. However, home treatment is strongly discouraged if an infection is suspected, or for those who have medical conditions that put feet at high risk, such as diabetes, nerve damage in the foot, or poor circulation.  Home care: If you don t have an infection or any  of the above medical conditions, you can soak your foot in room-temperature water (adding Epsom s salt may be recommended by your doctor), and gently massage the side of the nail fold to help reduce the inflammation.  Avoid attempting  bathroom surgery.  Repeated cutting of the nail can cause the condition to worsen over time. If your symptoms fail to improve, it s time to see a foot and ankle surgeon.  Physician care: After examining the toe, the foot and ankle surgeon will select the treatment best suited for you. If an infection is present, an oral antibiotic may be prescribed.  Sometimes a minor surgical procedure, often performed in the office, will ease the pain and remove the offending nail. After applying a local anesthetic, the doctor removes part of the nail s side border. Some nails may become ingrown again, requiring removal of the nail root.  Following the nail procedure, a light bandage will be applied. Most people experience very little pain after surgery and may resume normal activity the next day. If your surgeon has prescribed an oral antibiotic, be sure to take all the medication, even if your symptoms have improved.  PREVENTION:  Proper Trimming: Cut toenails in a fairly straight line, and don t cut them too short. You should be able to get your fingernail under the sides and end of the nail.   Well-fitting Footwear: Don t wear shoes that are short or tight in the toe area. Avoid shoes that are loose, because they too cause pressure on the toes, especially when running or walking briskly.     INGROWN TOENAIL REMOVAL AFTERCARE     Go directly home and elevate the affected foot on one or two pillows for the remainder of the day/evening if possible. Your toe may stay numb anywhere from 2-8 hours.     Take Tylenol, ibuprofen or another anti-inflammatory as needed for pain.     Take antibiotic if that has been prescribed. Finish the entire prescribed antibiotic even if your symptoms have improved.      The evening of the procedure, soak/wash the affected area in warm water (you may add Epsom salt) for 5 to 10 minutes. Do this twice a day for 2-4 weeks (6-8 weeks if you had phenol) (you may count showering/bathing as one soak).  After soaks, pat the area dry and then allow to airdry for a few minutes. Apply antibiotic ointment to the area and cover with 2 X 2 gauze and paper tape or band-aid.    You may pursue everyday activities as tolerated with either an open toe shoe or cut-out shoe as needed or you may wear regular shoes if no pain is noted.    Watch for any signs and symptoms of infection such as: redness, red streaks going up the foot/leg, swelling, pus or foul odor. Those that have had the phenol procedure, the toe will drain longer and will look like it is infected because it is a chemical burn.     Please call with questions.    NAIL FUNGUS / ONYCHOMYCOSIS   Nail fungus is not a hygiene problem and will likely not lead to significant medical problems. The nails may get thick causing pain and possibly local skin infection. Treatments include debridement (trimming), oral antifungals, topical antifungals and complete removal of the nail. Most fungal nails are not treated.     Topicals such as tea tree oil can be helpful for surface fungus and may, at best, limit progression. Over the counter creams (such as Lamisil) can also be used however, their effectiveness is also quite low.  Topical treatment with Pen lac is expensive and often not covered by insurance. Pen lac has an approximate 8% success rate. Topical therapy recommendations is to apply twice a day for at least 3-4 months as it takes 9 months for new nail to grow out.     Experts suggest soaking your feet for 15 to 20 minutes in a mixture of 1 cup vinegar to 4 cups warm water. Be sure to rinse well and pat your feet dry when you're done. You can soak your feet like this daily. But if your skin becomes irritated, try soaking only two to three  times a week. Vicks VapoRub, as with vinegar, there have been no controlled clinical trials to assess the effectiveness of Vicks VapoRub on nail fungus, but there have been numerous anecdotal reports that it works. There's no consensus on how often to apply this product, so check with your doctor before using it on your nails.      Oral therapies include Sporanox and Lamisil. Oral therapies are also expensive and not very effective. Side effects such as liver disease are the main concern. Return of fungus is common even if the treatment worked.      Other Tips:  - Penlac nail medication apply daily x 4 months; remove old polish first day of each week  - Antifungal cream/powder (Zeasorb) - apply daily to feet and shoes x 2 months  - Clean shoes with Lysol or in washing machine every few weeks  - Rotate shoe gear; give them 24 hours to dry out between days wearing them  - Clean pair of socks in morning, clean pair in afternoon if your feet sweat  - Shower shoes used in public showers/pools

## 2020-10-28 NOTE — PROGRESS NOTES
PATIENT HISTORY:  Alfa Cm is a 60 year old male who presents to clinic for pain to the right second toe.  He notes that the nail is very thick.  Rubs in his shoes or when bumped.  Can be 6 out of 10 pain at its worst.  Denies injury.  Has been going on for 2 months.  Wondering what can be done to help with the pain.    Review of Systems:  Patient denies fever, chills, rash, wound, stiffness, limping, numbness, weakness, heart burn, blood in stool, chest pain with activity, calf pain when walking, shortness of breath with activity, chronic cough, easy bleeding/bruising, swelling of ankles, excessive thirst, fatigue, depression, anxiety.       PAST MEDICAL HISTORY:   Past Medical History:   Diagnosis Date     Alcohol abuse      Coronary artery disease involving native coronary artery without angina pectoris 2011    angiplasty, stent placed at [facility]     Hyperlipidemia      Hypertension      Myocardial infarction (H)      Substance abuse (H)      Tobacco abuse         PAST SURGICAL HISTORY:   Past Surgical History:   Procedure Laterality Date     ME PATIENT HAS A CORONARY ARTERY STENT          MEDICATIONS:   Current Outpatient Medications:      albuterol (2.5 MG/3ML) 0.083% neb solution, Take 1 vial (2.5 mg) by nebulization every 6 hours as needed for shortness of breath / dyspnea or wheezing, Disp: 1 Box, Rfl: 1     albuterol (PROAIR HFA/PROVENTIL HFA/VENTOLIN HFA) 108 (90 BASE) MCG/ACT Inhaler, Inhale 1-2 puffs into the lungs every 6 hours as needed for shortness of breath / dyspnea or wheezing, Disp: 1 Inhaler, Rfl: 5     alcohol swab prep pads, Use to swab area of injection/kylee as directed., Disp: 100 each, Rfl: 3     aspirin 81 MG tablet, Take 1 tablet (81 mg) by mouth daily, Disp: , Rfl:      atorvastatin (LIPITOR) 40 MG tablet, Take 1 tablet (40 mg) by mouth daily, Disp: 90 tablet, Rfl: 3     blood glucose calibration (NO BRAND SPECIFIED) solution, Use to calibrate blood  glucose monitor as needed as directed., Disp: 1 Bottle, Rfl: 3     blood glucose monitoring (NO BRAND SPECIFIED) meter device kit, Use to test blood sugar as directed., Disp: 1 kit, Rfl: 0     blood glucose monitoring (NO BRAND SPECIFIED) test strip, Use to test blood sugar 4 times daily or as directed., Disp: 100 strip, Rfl: 6     carvedilol (COREG) 25 MG tablet, Take 1 tablet (25 mg) by mouth 2 times daily (with meals), Disp: 180 tablet, Rfl: 3     diphenhydrAMINE (BENADRYL) 50 MG capsule, Take 1 capsule (50 mg) by mouth nightly as needed for itching or allergies, Disp: 30 capsule, Rfl: 1     Fexofenadine HCl (ALLEGRA ALLERGY PO), , Disp: , Rfl:      furosemide (LASIX) 40 MG tablet, Take 1 tablet (40 mg) by mouth 2 times daily, Disp: 180 tablet, Rfl: 0     hydrOXYzine (ATARAX) 25 MG tablet, Take 2 tablets (50 mg) by mouth every 6 hours as needed for itching or anxiety, Disp: 30 tablet, Rfl: 0     lisinopril (PRINIVIL/ZESTRIL) 20 MG tablet, Take 1 tablet (20 mg) by mouth daily, Disp: 90 tablet, Rfl: 2     multivitamin, therapeutic (THERA-VIT) TABS tablet, Take 1 tablet by mouth daily, Disp: , Rfl:      nicotine polacrilex ([employer] NICOTINE POLACRILEX) 2 MG gum, Place 1 each (2 mg) inside cheek as needed for smoking cessation, Disp: 100 tablet, Rfl: 1     order for DME, Equipment being ordered: Other: Nebulizer machine Treatment Diagnosis: Pneumonia, reactive airway, Disp: 1 Device, Rfl: 0     QUEtiapine (SEROQUEL) 50 MG tablet, TAKE 1 TABLET BY MOUTH AT BEDTIME, Disp: 30 tablet, Rfl: 3     thin (NO BRAND SPECIFIED) lancets, Use with lanceting device., Disp: 100 each, Rfl: 1     ALLERGIES:    Allergies   Allergen Reactions     Pollen Extract         SOCIAL HISTORY:   Social History     Socioeconomic History     Marital status:      Spouse name: Not on file     Number of children: Not on file     Years of education: Not on file     Highest education level: Not on file   Occupational History     Not on file  "  Social Needs     Financial resource strain: Not on file     Food insecurity     Worry: Not on file     Inability: Not on file     Transportation needs     Medical: Not on file     Non-medical: Not on file   Tobacco Use     Smoking status: Current Every Day Smoker     Packs/day: 0.50     Types: Cigarettes     Smokeless tobacco: Never Used     Tobacco comment: quit date 10/16/17   Substance and Sexual Activity     Alcohol use: Yes     Alcohol/week: 20.0 standard drinks     Types: 20 Cans of beer per week     Drug use: No     Sexual activity: Never   Lifestyle     Physical activity     Days per week: Not on file     Minutes per session: Not on file     Stress: Not on file   Relationships     Social connections     Talks on phone: Not on file     Gets together: Not on file     Attends Rastafari service: Not on file     Active member of club or organization: Not on file     Attends meetings of clubs or organizations: Not on file     Relationship status: Not on file     Intimate partner violence     Fear of current or ex partner: Not on file     Emotionally abused: Not on file     Physically abused: Not on file     Forced sexual activity: Not on file   Other Topics Concern     Not on file   Social History Narrative     Not on file        FAMILY HISTORY:   Family History   Problem Relation Age of Onset     Diabetes Mother         EXAM:Vitals: /72   Ht 1.702 m (5' 7\")   Wt 89.4 kg (197 lb)   BMI 30.85 kg/m    BMI= Body mass index is 30.85 kg/m .    General appearance: Patient is alert and fully cooperative with history & exam.  No sign of distress is noted during the visit.     Psychiatric: Affect is pleasant & appropriate.  Patient appears motivated to improve health.     Respiratory: Breathing is regular & unlabored while sitting.     HEENT: Hearing is intact to spoken word.  Speech is clear.  No gross evidence of visual impairment that would impact ambulation.     Dermatologic: right 2nd toenail is thickened, " darkened and pain on palpation. No redness, open lesions or signs of infection.      Vascular: DP & PT pulses are intact & regular bilaterally.  No significant edema but varicosities noted.  CFT and skin temperature is normal to both lower extremities.     Neurologic: Lower extremity sensation is intact to light touch.  No evidence of weakness or contracture in the lower extremities.  No evidence of neuropathy.     Musculoskeletal: Patient is ambulatory without assistive device or brace.  No gross ankle deformity noted.  No foot or ankle joint effusion is noted.     ASSESSMENT:    Right foot pain  Dystrophic nail  Tobacco dependence syndrome     PLAN:  Reviewed patient's chart in epic. Discussed causes and treatments of nail fungus.  Explained that even if a culture comes back negative, a patient could still have nail fungus.  Discussed treatment options with patient and explained that there isn't one treatment that is 100% effective.  Discussed oral lamisil which is the most effective at about 70% but which can have liver effects.  Explained that if she wanted to try this that she would need serial blood draws to test her liver function.  Discussed over the counter antifungal creams.  Explained that these are about 50% effective and need to be applied once a day for about 6-8months.  Also talked about prescription penlac which is a nail laquer.  Again this is also only 50% effective.  Also discussed that if there was damage to the nail and the nail is now dystrophic that non of the above is going to change the nail.  If there was damage, there is note anything that can be done for the nail to correct it.  Discussed that if it becomes painful, we can remove the nail in clinic.        At this time I recommend removal of the nail it is quite thick and sore.  We will have him soak the foot twice a day for 2 weeks and apply antibiotic cream and a Band-Aid.  He was told to call with further questions or  concerns.    Procedure: After verbal consent, the right 2nd toe was anesthetized with 5cc's of 1% lidocaine plain. A tourniquet was applied to the toe. The entire nail was released from the nail bed with an elevator and removed completely with a hemostat.  Bacitracin was applied to the nail bed.  The tourniquet was removed.  Bandage was applied to the toe.  The patient tolerated the procedure and anesthesia well.        Justine Cruz DPM, Podiatry/Foot and Ankle Surgery    Weight management plan: Patient was referred to their PCP to discuss a diet and exercise plan.

## 2020-10-28 NOTE — LETTER
10/28/2020         RE: Alfa Cm  [address] [place] [address]        Dear Colleague,    Thank you for referring your patient, Alfa Cm, to the [facility]. Please see a copy of my visit note below.    PATIENT HISTORY:  Alfa Cm is a 60 year old male who presents to clinic for pain to the right second toe.  He notes that the nail is very thick.  Rubs in his shoes or when bumped.  Can be 6 out of 10 pain at its worst.  Denies injury.  Has been going on for 2 months.  Wondering what can be done to help with the pain.    Review of Systems:  Patient denies fever, chills, rash, wound, stiffness, limping, numbness, weakness, heart burn, blood in stool, chest pain with activity, calf pain when walking, shortness of breath with activity, chronic cough, easy bleeding/bruising, swelling of ankles, excessive thirst, fatigue, depression, anxiety.       PAST MEDICAL HISTORY:   Past Medical History:   Diagnosis Date     Alcohol abuse      Coronary artery disease involving native coronary artery without angina pectoris 2011    angiplasty, stent placed at [facility]     Hyperlipidemia      Hypertension      Myocardial infarction (H)      Substance abuse (H)      Tobacco abuse         PAST SURGICAL HISTORY:   Past Surgical History:   Procedure Laterality Date     DC PATIENT HAS A CORONARY ARTERY STENT          MEDICATIONS:   Current Outpatient Medications:      albuterol (2.5 MG/3ML) 0.083% neb solution, Take 1 vial (2.5 mg) by nebulization every 6 hours as needed for shortness of breath / dyspnea or wheezing, Disp: 1 Box, Rfl: 1     albuterol (PROAIR HFA/PROVENTIL HFA/VENTOLIN HFA) 108 (90 BASE) MCG/ACT Inhaler, Inhale 1-2 puffs into the lungs every 6 hours as needed for shortness of breath / dyspnea or wheezing, Disp: 1 Inhaler, Rfl: 5     alcohol swab prep pads, Use to swab area of injection/kylee as directed., Disp: 100 each, Rfl: 3      aspirin 81 MG tablet, Take 1 tablet (81 mg) by mouth daily, Disp: , Rfl:      atorvastatin (LIPITOR) 40 MG tablet, Take 1 tablet (40 mg) by mouth daily, Disp: 90 tablet, Rfl: 3     blood glucose calibration (NO BRAND SPECIFIED) solution, Use to calibrate blood glucose monitor as needed as directed., Disp: 1 Bottle, Rfl: 3     blood glucose monitoring (NO BRAND SPECIFIED) meter device kit, Use to test blood sugar as directed., Disp: 1 kit, Rfl: 0     blood glucose monitoring (NO BRAND SPECIFIED) test strip, Use to test blood sugar 4 times daily or as directed., Disp: 100 strip, Rfl: 6     carvedilol (COREG) 25 MG tablet, Take 1 tablet (25 mg) by mouth 2 times daily (with meals), Disp: 180 tablet, Rfl: 3     diphenhydrAMINE (BENADRYL) 50 MG capsule, Take 1 capsule (50 mg) by mouth nightly as needed for itching or allergies, Disp: 30 capsule, Rfl: 1     Fexofenadine HCl (ALLEGRA ALLERGY PO), , Disp: , Rfl:      furosemide (LASIX) 40 MG tablet, Take 1 tablet (40 mg) by mouth 2 times daily, Disp: 180 tablet, Rfl: 0     hydrOXYzine (ATARAX) 25 MG tablet, Take 2 tablets (50 mg) by mouth every 6 hours as needed for itching or anxiety, Disp: 30 tablet, Rfl: 0     lisinopril (PRINIVIL/ZESTRIL) 20 MG tablet, Take 1 tablet (20 mg) by mouth daily, Disp: 90 tablet, Rfl: 2     multivitamin, therapeutic (THERA-VIT) TABS tablet, Take 1 tablet by mouth daily, Disp: , Rfl:      nicotine polacrilex ([employer]S NICOTINE POLACRILEX) 2 MG gum, Place 1 each (2 mg) inside cheek as needed for smoking cessation, Disp: 100 tablet, Rfl: 1     order for DME, Equipment being ordered: Other: Nebulizer machine Treatment Diagnosis: Pneumonia, reactive airway, Disp: 1 Device, Rfl: 0     QUEtiapine (SEROQUEL) 50 MG tablet, TAKE 1 TABLET BY MOUTH AT BEDTIME, Disp: 30 tablet, Rfl: 3     thin (NO BRAND SPECIFIED) lancets, Use with lanceting device., Disp: 100 each, Rfl: 1     ALLERGIES:    Allergies   Allergen Reactions     Pollen Extract         SOCIAL  "HISTORY:   Social History     Socioeconomic History     Marital status:      Spouse name: Not on file     Number of children: Not on file     Years of education: Not on file     Highest education level: Not on file   Occupational History     Not on file   Social Needs     Financial resource strain: Not on file     Food insecurity     Worry: Not on file     Inability: Not on file     Transportation needs     Medical: Not on file     Non-medical: Not on file   Tobacco Use     Smoking status: Current Every Day Smoker     Packs/day: 0.50     Types: Cigarettes     Smokeless tobacco: Never Used     Tobacco comment: quit date 10/16/17   Substance and Sexual Activity     Alcohol use: Yes     Alcohol/week: 20.0 standard drinks     Types: 20 Cans of beer per week     Drug use: No     Sexual activity: Never   Lifestyle     Physical activity     Days per week: Not on file     Minutes per session: Not on file     Stress: Not on file   Relationships     Social connections     Talks on phone: Not on file     Gets together: Not on file     Attends Sabianist service: Not on file     Active member of club or organization: Not on file     Attends meetings of clubs or organizations: Not on file     Relationship status: Not on file     Intimate partner violence     Fear of current or ex partner: Not on file     Emotionally abused: Not on file     Physically abused: Not on file     Forced sexual activity: Not on file   Other Topics Concern     Not on file   Social History Narrative     Not on file        FAMILY HISTORY:   Family History   Problem Relation Age of Onset     Diabetes Mother         EXAM:Vitals: /72   Ht 1.702 m (5' 7\")   Wt 89.4 kg (197 lb)   BMI 30.85 kg/m    BMI= Body mass index is 30.85 kg/m .    General appearance: Patient is alert and fully cooperative with history & exam.  No sign of distress is noted during the visit.     Psychiatric: Affect is pleasant & appropriate.  Patient appears motivated to " improve health.     Respiratory: Breathing is regular & unlabored while sitting.     HEENT: Hearing is intact to spoken word.  Speech is clear.  No gross evidence of visual impairment that would impact ambulation.     Dermatologic: right 2nd toenail is thickened, darkened and pain on palpation. No redness, open lesions or signs of infection.      Vascular: DP & PT pulses are intact & regular bilaterally.  No significant edema but varicosities noted.  CFT and skin temperature is normal to both lower extremities.     Neurologic: Lower extremity sensation is intact to light touch.  No evidence of weakness or contracture in the lower extremities.  No evidence of neuropathy.     Musculoskeletal: Patient is ambulatory without assistive device or brace.  No gross ankle deformity noted.  No foot or ankle joint effusion is noted.     ASSESSMENT:    Right foot pain  Dystrophic nail  Tobacco dependence syndrome     PLAN:  Reviewed patient's chart in epic. Discussed causes and treatments of nail fungus.  Explained that even if a culture comes back negative, a patient could still have nail fungus.  Discussed treatment options with patient and explained that there isn't one treatment that is 100% effective.  Discussed oral lamisil which is the most effective at about 70% but which can have liver effects.  Explained that if she wanted to try this that she would need serial blood draws to test her liver function.  Discussed over the counter antifungal creams.  Explained that these are about 50% effective and need to be applied once a day for about 6-8months.  Also talked about prescription penlac which is a nail laquer.  Again this is also only 50% effective.  Also discussed that if there was damage to the nail and the nail is now dystrophic that non of the above is going to change the nail.  If there was damage, there is note anything that can be done for the nail to correct it.  Discussed that if it becomes painful, we can remove  the nail in clinic.        At this time I recommend removal of the nail it is quite thick and sore.  We will have him soak the foot twice a day for 2 weeks and apply antibiotic cream and a Band-Aid.  He was told to call with further questions or concerns.    Procedure: After verbal consent, the right 2nd toe was anesthetized with 5cc's of 1% lidocaine plain. A tourniquet was applied to the toe. The entire nail was released from the nail bed with an elevator and removed completely with a hemostat.  Bacitracin was applied to the nail bed.  The tourniquet was removed.  Bandage was applied to the toe.  The patient tolerated the procedure and anesthesia well.        Justine Cruz DPM, Podiatry/Foot and Ankle Surgery    Weight management plan: Patient was referred to their PCP to discuss a diet and exercise plan.        Again, thank you for allowing me to participate in the care of your patient.        Sincerely,        Justine Cruz DPM, Podiatry/Foot and Ankle Surgery

## 2020-11-06 ENCOUNTER — NURSE TRIAGE (OUTPATIENT)
Dept: NURSING | Facility: CLINIC | Age: 60
End: 2020-11-06

## 2020-11-06 NOTE — TELEPHONE ENCOUNTER
Called to request for sleep machine.  States that pharmacist told him to call this number.  Gloria Gallegos, RN    Reason for Disposition    [1] Caller requesting NON-URGENT health information AND [2] PCP's office is the best resource     Sleep machine    Additional Information    Negative: [1] Caller is not with the adult (patient) AND [2] reporting urgent symptoms    Negative: Lab result questions    Negative: Medication questions    Negative: Caller can't be reached by phone    Negative: Caller has already spoken to PCP or another triager    Negative: RN needs further essential information from caller in order to complete triage    Negative: Requesting regular office appointment    Protocols used: INFORMATION ONLY CALL-A-

## 2020-11-06 NOTE — TELEPHONE ENCOUNTER
Pt is calling.    See note from earlier today as well.    He is wanting a script for a sleep machine from the pharmacy. Out of medication. The machine lets out a mist to help him sleep. He had a script for sleeping medication but he is out of it and doesn't feel like it was working. He is unable to describe the machine or what it is. He just stated that it is a machine that puts out a mist. Possibly a diffuser? Patient is unsure. Pharmacist told him to call us for a script. I advised him that he could ask the pharmacist to send over a request for what he is needing so that we know to send in the right RX. I advised him that I would also send another message over to his PCP for evaluation and that we would call him back, if not later today, than early next week. He verbalized understanding.  Pharmacy: [facility] in [place] on  [address].    Additional Information    Negative: Drug overdose and triager unable to answer question    Negative: Caller requesting information unrelated to medicine    Negative: Caller requesting a prescription for Strep throat and has a positive culture result    Negative: Rash while taking a medication or within 3 days of stopping it    Negative: Immunization reaction suspected    Negative: Asthma and having symptoms of asthma (cough, wheezing, etc.)    Negative: Breastfeeding questions about mother's medicines and diet    Negative: MORE THAN A DOUBLE DOSE of a prescription or over-the-counter (OTC) drug    Negative: DOUBLE DOSE (an extra dose or lesser amount) of over-the-counter (OTC) drug and any symptoms (e.g., dizziness, nausea, pain, sleepiness)    Negative: DOUBLE DOSE (an extra dose or lesser amount) of prescription drug and any symptoms (e.g., dizziness, nausea, pain, sleepiness)    Negative: Took another person's prescription drug    Negative: DOUBLE DOSE (an extra dose or lesser amount) of prescription drug and NO symptoms (Exception: a double dose of antibiotics)    Negative:  Diabetes drug error or overdose (e.g., took wrong type of insulin or took extra dose)    Caller requesting a NON-URGENT new prescription or refill and triager unable to refill per department policy    Negative: Caller has medication question about med not prescribed by PCP and triager unable to answer question (e.g., compatibility with other med, storage)    Negative: Request for URGENT new prescription or refill of 'essential' medication (i.e., likelihood of harm to patient if not taken) and triager unable to fill per department policy    Negative: Prescription not at pharmacy and was prescribed today by PCP    Negative: Pharmacy calling with prescription questions and triager unable to answer question    Negative: Caller has urgent medication question about med that PCP prescribed and triager unable to answer question    Negative: Caller has NON-URGENT medication question about med that PCP prescribed and triager unable to answer question    Protocols used: MEDICATION QUESTION CALL-A-[place]    Cassie Amato RN  [facility] Nurse Advisor  11/6/2020 at 2:33 PM

## 2020-11-09 NOTE — TELEPHONE ENCOUNTER
Writer is wondering if patient is asking about a humidifier.    Called pharmacy but not able to figure out who patient spoke to at the pharmacy.  They are also wondering if patient is asking about a humidifier. This would not be covered by patient insurance, but if he has an HSA or FSA, a prescription would allow patient to be reimbursed from HSA or FSA.    Left message for patient to call the clinic.  Ask to speak to any triage nurse, let them know it's a return call.    Leave a number and time that you can be reached.   Jessica Judge RN

## 2020-12-16 ENCOUNTER — NURSE TRIAGE (OUTPATIENT)
Dept: NURSING | Facility: CLINIC | Age: 60
End: 2020-12-16

## 2020-12-16 DIAGNOSIS — G47.00 PERSISTENT INSOMNIA: ICD-10-CM

## 2020-12-16 NOTE — LETTER
December 27, 2020      [facility]  [address] [place] [address]        Delroy Grimm,      We received a refill request for QUEtiapine (SEROQUEL) 50 MG tablet; however, you are due for a visit to receive a refill. Please call us at [phone] at your earliest convenience to schedule an appointment.       We greatly appreciate the opportunity to serve you and thank you for trusting us with your health care.        Your health care team at [facility]               Sincerely,        Lizette Lynch MD

## 2020-12-16 NOTE — TELEPHONE ENCOUNTER
Refill received today- Please see other refill encounter from today that had been routed to refill pool. No other refill encounters for this medication received in the last couple of months. Closing this encounter.    Temitope Sharma RN

## 2020-12-16 NOTE — TELEPHONE ENCOUNTER
RN triage   Call from pt   Pt states he ran out of quetiapine 1 week ago and not sleeping   Pt wants refill   Pt states he contacted [facility] and told they have not heard back about refill   Verified allergies and Pharmacy    Please advise     Yolette Holt RN  [facility]  Triage Nurse Advisor      Additional Information    Caller requesting a NON-URGENT new prescription or refill and triager unable to refill per department policy    Protocols used: MEDICATION QUESTION CALL-A-[place]

## 2020-12-21 RX ORDER — QUETIAPINE FUMARATE 50 MG/1
TABLET, FILM COATED ORAL
Qty: 30 TABLET | Refills: 3 | OUTPATIENT
Start: 2020-12-21

## 2020-12-27 NOTE — TELEPHONE ENCOUNTER
LM to call back and schedule a visit for further refills. Pt has no active Richard Pauer - 3P message. Sent letter.      Kristen CIFUENTES  nicole Melo

## 2021-01-30 DIAGNOSIS — G47.00 PERSISTENT INSOMNIA: ICD-10-CM

## 2021-02-01 RX ORDER — QUETIAPINE FUMARATE 50 MG/1
TABLET, FILM COATED ORAL
Qty: 30 TABLET | Refills: 0 | Status: SHIPPED | OUTPATIENT
Start: 2021-02-01 | End: 2021-03-09

## 2021-02-01 NOTE — TELEPHONE ENCOUNTER
seroquel refill request  Last OV/virtual: 6/5/20 Dr. Lynch  To MD to advise; failed RN refill protocol  Needing multiple labs.  See protocol.

## 2021-02-05 NOTE — IP AVS SNAPSHOT
MRN:[id number]                      After Visit Summary   10/9/2017    Alfa Cm    MRN: [id number]           Thank you!     Thank you for choosing [facility] for your care. Our goal is always to provide you with excellent care. Hearing back from our patients is one way we can continue to improve our services. Please take a few minutes to complete the written survey that you may receive in the mail after you visit. If you would like to speak to someone directly about your visit please contact Patient Relations at [phone]. Thank you!          Patient Information     Date Of Birth          1960        Designated Caregiver       Most Recent Value    Caregiver    Will someone help with your care after discharge? yes    Name of designated caregiver Hina    Phone number of caregiver [phone]    Caregiver address on chart      About your hospital stay     You were admitted on:  October 10, 2017 You last received care in the:  [place] [address] [facility]    You were discharged on:  October 18, 2017        Reason for your hospital stay       Pneumonia and heart failure                  Who to Call     For medical emergencies, please call 911.  For non-urgent questions about your medical care, please call your primary care provider or clinic, [phone]          Attending Provider     Provider Specialty    Igor Tian MD Emergency Medicine    [facility]       Primary Care Provider Office Phone # Fax #    Aravind Singh [phone]       When to contact your care team       Call if questions.  Notify provider if fevers, worsening shortness of breath, chest pain, other new medical concerns.                  After Care Instructions     Activity       Your activity upon discharge: activity as tolerated with oxygen            Diet       Follow this diet upon discharge: 2 gram salt restricted diet.  AVOID all  Discharge Note: Pt will be discharged back to her [place] [address] [place] [address]; [phone]. Pt will be transported via taxi and it will be self pay. ARLENE 
informed the pts neighbor, Bridgette ([phone]), who stated that she would open the door 
for her as she has spare keys. Upon discharge the pt appears to be in a euthymic mood and 
presents with a cam affect. Pt appears to be alert and oriented x4 (time, place, self and 
situation). Pt denies suicidal and homicidal ideation as well as auditory and visual 
hallucinations. Pt appears to be ambulatory with an unsteady gait and appears to be 
appropriately groomed. Pt will continue to be under the care of her psychiatrist, Dr. Benton, located at [address] [place] [address] [address], [place] [address]; [phone] 
and her internist, Dr. Triplett, located at [address], [address], [place] [address]; [phone]. Pt was given substance abuse referrals at the time of 
discharge that are listed below. The multidisciplinary exit care form were done, printed, 
signed, and given to the patient.



Referrals: 

[facility]

[address] [place] [address]

 Tel. [phone]

[facility]

Primary Care

[facility] [address] [place] Provider

Mental Health Treatment

Tele-dermatology

HIV Services

Telemedicine Services

[facility]

[address] [place] [address]

Phone: [phone]

[facility] 

[address] [place] [address]

Phone: [phone] alcohol.            Discharge Instructions       Check pre-meal glucose one to two times daily and record them, if glucoses under 70 or over 200 call DrNeville            Monitor and record       Take your weight everyday and write down the number.  If it increases more than 3 lbs from your initial weight when you get home contact the cardiology clinic.            Oxygen Adult       [place] [facility] Order 2 liter(s) by nasal cannula continuously at rest and 4 liter(s) by nasal cannula when active with use of portable tank. Expected treatment length is 2 months.. Test on conserving device as applicable.    Patients who qualify for home O2 coverage under the CMS guidelines require ABG tests or O2 sat readings obtained closest to, but no earlier than 2 days prior to the discharge, as evidence of the need for home oxygen therapy. Testing must be performed while patient is in the chronic stable state. See notes for O2 sats.    I certify that this patient, Alfa Cm has been under my care and that I, or a nurse practitioner or physician's assistant working with me, had a face-to-face encounter that meets the face-to-face encounter requirements with this patient on 10/18/2017. The patient, Alfa Cm was evaluated or treated in whole, or in part, for the following medical condition, which necessitates the use of the ordered oxygen. Treatment Diagnosis: Pneumonia    Attending Provider: Jorge L Munoz DO [facility]  Physician signature: See electronic signature associated with these discharge orders  Date of Order: October 18, 2017                  Follow-up Appointments     Follow Up and recommended labs and tests       Follow-up with [facility] cardiology 2 weeks nurse practitioner and 1 month cardiologist and for echocardiogram.  Follow-up and establish with primary provider in the next week.  You should have a basic metabolic panel lab draw the day of follow-up.                  Your next 10 appointments already  scheduled     Oct 24, 2017  9:00 AM CDT   [facility] Tulio [facility] MD   [facility]    [address] [address] [phone]           Bring a current list of meds and any records pertaining to this visit. For Physicals, please bring immunization records and any forms needing to be filled out. Please arrive 10 minutes early to complete paperwork.            Nov 01, 2017  2:30 PM CDT   Return Discharge with MATTIE Darnell CNP   [facility] [place] ([facility])    [address]  [facility] [id number] [phone]            Nov 20, 2017  8:45 AM CST   Return Visit with Alvarado Carson MD   [facility] ([facility])    [address]  [facility] [id number] [phone]              Further instructions from your care team       Oxygen Provider:  Arranged through [employer], If you have any questions for concerns please call the oxygen company directly at [phone]    Follow up scheduled:  1. [facility] in [place] with Radha Arango NP on 11/1 at 2:30.  2. [facility] in [place] with Dr Carson on 11/20 at 8:45.  3. [facility] clinic in [place] with Lui Arango on Tues 10/24 at 9:00. His office is on the second floor room 230.       Pending Results     No orders found from 10/7/2017 to 10/10/2017.            Statement of Approval     Ordered          10/18/17 1346  I have reviewed and agree with all the recommendations and orders detailed in this document.  EFFECTIVE NOW     Approved and electronically signed by:  Jorge L Munoz DO             Admission Information     Date & Time Provider Department Dept. Phone    10/9/2017 Santos James MD [place] [address] [facility] [phone]      Your Vitals Were     Blood Pressure Pulse Temperature Respirations  "Height Weight    129/78 (BP Location: Left arm) 83 98.4  F (36.9  C) (Oral) 20 1.702 m (5' 7\") 86.8 kg (191 lb 5.8 oz)    Pulse Oximetry BMI (Body Mass Index)                88% 29.97 kg/m2          [employer] Information     [employer] lets you send messages to your doctor, view your test results, renew your prescriptions, schedule appointments and more. To sign up, go to [email or web address] . Click on \"Log in\" on the left side of the screen, which will take you to the Welcome page. Then click on \"Sign up Now\" on the right side of the page.     You will be asked to enter the access code listed below, as well as some personal information. Please follow the directions to create your username and password.     Your access code is: [email or web address]  Expires: 2018  1:55 PM     Your access code will  in 90 days. If you need help or a new code, please call your [facility] clinic or [phone].        Care EveryWhere ID     This is your Care EveryWhere ID. This could be used by other organizations to access your [facility] medical records  [id number]        Equal Access to Services     SHIKHA MIRANDA AH: Kristie Eldridge, waaxda luqadaha, qaybta kaalmada adeegyada, ismael lee. So [facility] [phone].    ATENCIÓN: Si habla español, tiene a rao disposición servicios gratuitos de asistencia lingüística. Llame al [phone].    We comply with applicable federal civil rights laws and [place] laws. We do not discriminate on the basis of race, color, national origin, age, disability, sex, sexual orientation, or gender identity.               Review of your medicines      START taking        Dose / Directions    alcohol swab prep pads   Used for:  Hyperglycemia        Use to swab area of injection/kylee as directed.   Quantity:  100 each   Refills:  3       blood glucose calibration solution   Commonly known as:  NO BRAND SPECIFIED   Used for:  Hyperglycemia        Use to calibrate " blood glucose monitor as needed as directed.   Quantity:  1 Bottle   Refills:  3       blood glucose monitoring meter device kit   Commonly known as:  no brand specified   Used for:  Hyperglycemia        Use to test blood sugar as directed.   Quantity:  1 kit   Refills:  0       blood glucose monitoring test strip   Commonly known as:  no brand specified   Used for:  Hyperglycemia        Use to test blood sugar 4 times daily or as directed.   Quantity:  100 strip   Refills:  6       carvedilol 6.25 MG tablet   Commonly known as:  COREG   Used for:  Congestive heart failure, unspecified congestive heart failure chronicity, unspecified congestive heart failure type (H)        Dose:  6.25 mg   Take 1 tablet (6.25 mg) by mouth 2 times daily (with meals)   Quantity:  60 tablet   Refills:  1       cefpodoxime 200 MG tablet   Commonly known as:  VANTIN   Used for:  Community acquired pneumonia of right lower lobe of lung (H)        Dose:  200 mg   Take 1 tablet (200 mg) by mouth 2 times daily for 3 doses to finish course   Quantity:  3 tablet   Refills:  0       furosemide 40 MG tablet   Commonly known as:  LASIX   Used for:  Congestive heart failure, unspecified congestive heart failure chronicity, unspecified congestive heart failure type (H)        Dose:  40 mg   Take 1 tablet (40 mg) by mouth 2 times daily   Quantity:  60 tablet   Refills:  0       order for DME   Used for:  Community acquired pneumonia of right lower lobe of lung (H)        Equipment being ordered: Other: Nebulizer machine Treatment Diagnosis: Pneumonia, reactive airway   Quantity:  1 Device   Refills:  0       polyethylene glycol Packet   Commonly known as:  MIRALAX/GLYCOLAX   Used for:  Constipation, unspecified constipation type        Dose:  17 g   Take 17 g by mouth daily as needed for constipation   Quantity:  7 packet   Refills:  0       predniSONE 10 MG tablet   Commonly known as:  DELTASONE   Used for:  Community acquired pneumonia of right  lower lobe of lung (H)        Dose:  10 mg   Take 1 tablet (10 mg) by mouth daily for 4 days then stop   Quantity:  4 tablet   Refills:  0       thin lancets   Commonly known as:  NO BRAND SPECIFIED   Used for:  Hyperglycemia        Use with lanceting device.   Quantity:  100 each   Refills:  1         CONTINUE these medicines which may have CHANGED, or have new prescriptions. If we are uncertain of the size of tablets/capsules you have at home, strength may be listed as something that might have changed.        Dose / Directions    * albuterol 108 (90 BASE) MCG/ACT Inhaler   Commonly known as:  PROAIR HFA/PROVENTIL HFA/VENTOLIN HFA   This may have changed:  Another medication with the same name was added. Make sure you understand how and when to take each.        Dose:  1-2 puff   Inhale 1-2 puffs into the lungs every 6 hours as needed for shortness of breath / dyspnea or wheezing   Refills:  0       * albuterol (2.5 MG/3ML) 0.083% neb solution   This may have changed:  You were already taking a medication with the same name, and this prescription was added. Make sure you understand how and when to take each.   Used for:  Community acquired pneumonia of right lower lobe of lung (H)        Dose:  3 mL   Take 1 vial (2.5 mg) by nebulization 4 times daily   Quantity:  360 mL   Refills:  1       pantoprazole 40 MG EC tablet   Commonly known as:  PROTONIX   This may have changed:  medication strength   Used for:  Gastroesophageal reflux disease, esophagitis presence not specified        Dose:  40 mg   Take 1 tablet (40 mg) by mouth every morning   Quantity:  30 tablet   Refills:  0       * Notice:  This list has 2 medication(s) that are the same as other medications prescribed for you. Read the directions carefully, and ask your doctor or other care provider to review them with you.      CONTINUE these medicines which have NOT CHANGED        Dose / Directions    ASPIRIN ADULT LOW STRENGTH PO        Dose:  81 mg   Take  81 mg by mouth daily   Refills:  0       ATORVASTATIN CALCIUM PO        Dose:  40 mg   Take 40 mg by mouth daily   Refills:  0       ferrous sulfate 325 (65 FE) MG tablet   Commonly known as:  IRON        Dose:  325 mg   Take 325 mg by mouth daily (with breakfast)   Refills:  0       lisinopril 20 MG tablet   Commonly known as:  PRINIVIL/ZESTRIL        Dose:  20 mg   Take 20 mg by mouth daily   Refills:  0       multivitamin, therapeutic Tabs tablet        Dose:  1 tablet   Take 1 tablet by mouth daily   Refills:  0            Where to get your medicines      These medications were sent to [place], [place] - [address] [address] [facility]  [address] [facility] [address]     Phone:  [phone]     albuterol (2.5 MG/3ML) 0.083% neb solution    alcohol swab prep pads    blood glucose calibration solution    blood glucose monitoring meter device kit    blood glucose monitoring test strip    carvedilol 6.25 MG tablet    cefpodoxime 200 MG tablet    furosemide 40 MG tablet    pantoprazole 40 MG EC tablet    predniSONE 10 MG tablet    thin lancets         Some of these will need a paper prescription and others can be bought over the counter. Ask your nurse if you have questions.     Bring a paper prescription for each of these medications     order for DME       You don't need a prescription for these medications     polyethylene glycol Packet               ANTIBIOTIC INSTRUCTION     You've Been Prescribed an Antibiotic - Now What?  Your healthcare team thinks that you or your loved one might have an infection. Some infections can be treated with antibiotics, which are powerful, life-saving drugs. Like all medications, antibiotics have side effects and should only be used when necessary. There are some important things you should know about your antibiotic treatment.      Your healthcare team may run tests before you start taking an antibiotic.    Your team may take samples (e.g., from  your blood, urine or other areas) to run tests to look for bacteria. These test can be important to determine if you need an antibiotic at all and, if you do, which antibiotic will work best.      Within a few days, your healthcare team might change or even stop your antibiotic.    Your team may start you on an antibiotic while they are working to find out what is making you sick.    Your team might change your antibiotic because test results show that a different antibiotic would be better to treat your infection.    In some cases, once your team has more information, they learn that you do not need an antibiotic at all. They may find out that you don't have an infection, or that the antibiotic you're taking won't work against your infection. For example, an infection caused by a virus can't be treated with antibiotics. Staying on an antibiotic when you don't need it is more likely to be harmful than helpful.      You may experience side effects from your antibiotic.    Like all medications, antibiotics have side effects. Some of these can be serious.    Let you healthcare team know if you have any known allergies when you are admitted to the hospital.    One significant side effect of nearly all antibiotics is the risk of severe and sometimes deadly diarrhea caused by Clostridium difficile (C. Difficile). This occurs when a person takes antibiotics because some good germs are destroyed. Antibiotic use allows C. diificile to take over, putting patients at high risk for this serious infection.    As a patient or caregiver, it is important to understand your or your loved one's antibiotic treatment. It is especially important for caregivers to speak up when patients can't speak for themselves. Here are some important questions to ask your healthcare team.    What infection is this antibiotic treating and how do you know I have that infection?    What side effects might occur from this antibiotic?    How long will I  need to take this antibiotic?    Is it safe to take this antibiotic with other medications or supplements (e.g., vitamins) that I am taking?     Are there any special directions I need to know about taking this antibiotic? For example, should I take it with food?    How will I be monitored to know whether my infection is responding to the antibiotic?    What tests may help to make sure the right antibiotic is prescribed for me?      Information provided by:  www.cdc.gov/getsmart  U.S. Department of Health and Human Services  Centers for disease Control and Prevention  National Center for Emerging and Zoonotic Infectious Diseases  Division of Healthcare Quality Promotion         Protect others around you: Learn how to safely use, store and throw away your medicines at www.disposemymeds.org.             Medication List: This is a list of all your medications and when to take them. Check marks below indicate your daily home schedule. Keep this list as a reference.      Medications           Morning Afternoon Evening Bedtime As Needed    * albuterol 108 (90 BASE) MCG/ACT Inhaler   Commonly known as:  PROAIR HFA/PROVENTIL HFA/VENTOLIN HFA   Inhale 1-2 puffs into the lungs every 6 hours as needed for shortness of breath / dyspnea or wheezing                                * albuterol (2.5 MG/3ML) 0.083% neb solution   Take 1 vial (2.5 mg) by nebulization 4 times daily   Last time this was given:  2.5 mg on 10/13/2017  9:38 AM                                alcohol swab prep pads   Use to swab area of injection/kyele as directed.                                ASPIRIN ADULT LOW STRENGTH PO   Take 81 mg by mouth daily   Last time this was given:  81 mg on 10/18/2017  8:30 AM                                ATORVASTATIN CALCIUM PO   Take 40 mg by mouth daily   Last time this was given:  40 mg on 10/18/2017  8:30 AM                                blood glucose calibration solution   Commonly known as:  NO BRAND SPECIFIED   Use  to calibrate blood glucose monitor as needed as directed.                                blood glucose monitoring meter device kit   Commonly known as:  no brand specified   Use to test blood sugar as directed.                                blood glucose monitoring test strip   Commonly known as:  no brand specified   Use to test blood sugar 4 times daily or as directed.                                carvedilol 6.25 MG tablet   Commonly known as:  COREG   Take 1 tablet (6.25 mg) by mouth 2 times daily (with meals)   Last time this was given:  6.25 mg on 10/18/2017  8:31 AM                                cefpodoxime 200 MG tablet   Commonly known as:  VANTIN   Take 1 tablet (200 mg) by mouth 2 times daily for 3 doses to finish course                                ferrous sulfate 325 (65 FE) MG tablet   Commonly known as:  IRON   Take 325 mg by mouth daily (with breakfast)                                furosemide 40 MG tablet   Commonly known as:  LASIX   Take 1 tablet (40 mg) by mouth 2 times daily   Last time this was given:  40 mg on 10/18/2017  8:30 AM                                lisinopril 20 MG tablet   Commonly known as:  PRINIVIL/ZESTRIL   Take 20 mg by mouth daily   Last time this was given:  20 mg on 10/18/2017  8:30 AM                                multivitamin, therapeutic Tabs tablet   Take 1 tablet by mouth daily   Last time this was given:  1 tablet on 10/18/2017  8:31 AM                                order for DME   Equipment being ordered: Other: Nebulizer machine Treatment Diagnosis: Pneumonia, reactive airway                                pantoprazole 40 MG EC tablet   Commonly known as:  PROTONIX   Take 1 tablet (40 mg) by mouth every morning   Last time this was given:  40 mg on 10/18/2017  8:30 AM                                polyethylene glycol Packet   Commonly known as:  MIRALAX/GLYCOLAX   Take 17 g by mouth daily as needed for constipation                                predniSONE  10 MG tablet   Commonly known as:  DELTASONE   Take 1 tablet (10 mg) by mouth daily for 4 days then stop   Last time this was given:  20 mg on 10/18/2017  8:30 AM                                thin lancets   Commonly known as:  NO BRAND SPECIFIED   Use with lanceting device.                                * Notice:  This list has 2 medication(s) that are the same as other medications prescribed for you. Read the directions carefully, and ask your doctor or other care provider to review them with you.

## 2021-02-23 DIAGNOSIS — G47.00 PERSISTENT INSOMNIA: ICD-10-CM

## 2021-02-24 NOTE — TELEPHONE ENCOUNTER
Routing refill request to provider for review/approval because:  Labs not current:  CBC, lipid, A1C or glucose  Visit is not current and no current heart rate on file.    Monika Cannon RN, [facility] Triage

## 2021-03-01 NOTE — TELEPHONE ENCOUNTER
Please schedule for office visit. Last note states that he needs a physical for more refills. Lizette Lynch MD

## 2021-03-08 ENCOUNTER — TELEPHONE (OUTPATIENT)
Dept: FAMILY MEDICINE | Facility: CLINIC | Age: 61
End: 2021-03-08

## 2021-03-08 NOTE — TELEPHONE ENCOUNTER
Call received from patient requesting Seroquel refill.    Reviewed 6/5/20 virtual visit plan and informed patient:  1. In person visit needed for further refills  2. Writer can help schedule with Dr. Lynch, or patient can re-establish care at [facility]. Over three years since last seen at [place] location    Patient verbalized understanding and in agreement with plan.    Patient requested appt at [place].    Appt scheduled on 3/10/21 with Dr. Vines, as AUGIE Walter CNP, not available this week and patient requested appt this week.    Appt date, time and location confirmed with patient.    Patient's questions regarding COVID-19 vaccination to the best of writer's knowledge.    RENNY CramerN, RN  [facility] [place]

## 2021-03-09 RX ORDER — QUETIAPINE FUMARATE 50 MG/1
TABLET, FILM COATED ORAL
Qty: 30 TABLET | Refills: 0 | Status: SHIPPED | OUTPATIENT
Start: 2021-03-09 | End: 2021-03-24 | Stop reason: DRUGHIGH

## 2021-03-09 NOTE — TELEPHONE ENCOUNTER
Appears pt called yesterday and scheduled an 'establish care' visit at [facility] due to pending med

## 2021-03-10 DIAGNOSIS — I10 BENIGN ESSENTIAL HYPERTENSION: ICD-10-CM

## 2021-03-10 DIAGNOSIS — I25.10 CORONARY ARTERY DISEASE INVOLVING NATIVE CORONARY ARTERY OF NATIVE HEART WITHOUT ANGINA PECTORIS: ICD-10-CM

## 2021-03-10 RX ORDER — LISINOPRIL 20 MG/1
20 TABLET ORAL DAILY
Qty: 90 TABLET | Refills: 0 | Status: SHIPPED | OUTPATIENT
Start: 2021-03-10 | End: 2021-11-08

## 2021-03-24 ENCOUNTER — OFFICE VISIT (OUTPATIENT)
Dept: FAMILY MEDICINE | Facility: CLINIC | Age: 61
End: 2021-03-24
Payer: COMMERCIAL

## 2021-03-24 VITALS
BODY MASS INDEX: 28.25 KG/M2 | RESPIRATION RATE: 16 BRPM | OXYGEN SATURATION: 98 % | SYSTOLIC BLOOD PRESSURE: 160 MMHG | HEART RATE: 86 BPM | TEMPERATURE: 98.1 F | DIASTOLIC BLOOD PRESSURE: 82 MMHG | WEIGHT: 180 LBS | HEIGHT: 67 IN

## 2021-03-24 DIAGNOSIS — F10.20 ALCOHOLISM (H): ICD-10-CM

## 2021-03-24 DIAGNOSIS — R03.0 ELEVATED BLOOD PRESSURE READING WITHOUT DIAGNOSIS OF HYPERTENSION: ICD-10-CM

## 2021-03-24 DIAGNOSIS — F41.8 SITUATIONAL ANXIETY: ICD-10-CM

## 2021-03-24 DIAGNOSIS — G47.00 PERSISTENT INSOMNIA: ICD-10-CM

## 2021-03-24 DIAGNOSIS — J30.9 CHRONIC ALLERGIC RHINITIS: ICD-10-CM

## 2021-03-24 PROCEDURE — 99214 OFFICE O/P EST MOD 30 MIN: CPT | Performed by: FAMILY MEDICINE

## 2021-03-24 RX ORDER — FEXOFENADINE HCL 180 MG/1
180 TABLET ORAL DAILY
Qty: 90 TABLET | Refills: 3 | Status: SHIPPED | OUTPATIENT
Start: 2021-03-24 | End: 2023-08-04

## 2021-03-24 RX ORDER — HYDROXYZINE PAMOATE 50 MG/1
50 CAPSULE ORAL 4 TIMES DAILY PRN
Qty: 90 CAPSULE | Refills: 0 | Status: SHIPPED | OUTPATIENT
Start: 2021-03-24 | End: 2021-07-20

## 2021-03-24 RX ORDER — FLUTICASONE PROPIONATE 50 MCG
2 SPRAY, SUSPENSION (ML) NASAL DAILY
Qty: 16 G | Refills: 2 | Status: SHIPPED | OUTPATIENT
Start: 2021-03-24 | End: 2022-12-19

## 2021-03-24 RX ORDER — QUETIAPINE FUMARATE 100 MG/1
100 TABLET, FILM COATED ORAL AT BEDTIME
Qty: 90 TABLET | Refills: 0 | Status: SHIPPED | OUTPATIENT
Start: 2021-03-24 | End: 2021-07-20

## 2021-03-24 ASSESSMENT — ANXIETY QUESTIONNAIRES
4. TROUBLE RELAXING: SEVERAL DAYS
7. FEELING AFRAID AS IF SOMETHING AWFUL MIGHT HAPPEN: NOT AT ALL
1. FEELING NERVOUS, ANXIOUS, OR ON EDGE: SEVERAL DAYS
GAD7 TOTAL SCORE: 3
7. FEELING AFRAID AS IF SOMETHING AWFUL MIGHT HAPPEN: NOT AT ALL
5. BEING SO RESTLESS THAT IT IS HARD TO SIT STILL: NOT AT ALL
6. BECOMING EASILY ANNOYED OR IRRITABLE: NOT AT ALL
3. WORRYING TOO MUCH ABOUT DIFFERENT THINGS: SEVERAL DAYS
2. NOT BEING ABLE TO STOP OR CONTROL WORRYING: NOT AT ALL
GAD7 TOTAL SCORE: 3

## 2021-03-24 ASSESSMENT — MIFFLIN-ST. JEOR: SCORE: 1599.06

## 2021-03-24 NOTE — PATIENT INSTRUCTIONS
I would like to see you within 3 months again. Good luck in treatment!    Thank you for choosing [facility] today for your health care needs.     [facility] is transforming primary care  At [facility], we re dedicated to constantly improve how we serve the health care needs of our patients and communities. We re currently making changes to the way we deliver care.      Changes you ll notice include:    An emphasis on building a relationship with a primary care provider    Access to a PAL (personal advocate and liaison) to help guide you with your care needs    Appointment lengths tailored to your specific needs    Greater access to a broader care team and community resources     Improved online access to your care team    Benefits of a primary care provider  If you don t have a designated primary care provider, we encourage you to get to know our care team online and find a provider you d like to see. Most of our providers have a short video on their online provider page. Visit [email or web address] to explore our providers and locations.    Benefits of having a primary care provider include:      They get to know you - your health history, family history and goals, making it easier to make a health plan together.     You get to know them - making health-related conversations and decisions easier      Primary care doctors help you when you re sick or hurt - but also focus on keeping you healthy with preventive care and screenings.      A doctor who sees you regularly is more likely to notice changes in your health.     To help make sure you get the right care, at the right time, we include PALs, or Patient Advocate Liaisons, as part of your care team. Your PAL will be your first line of contact. They ll advocate for your needs and help you navigate our services, connecting you with care team members and community resources to ensure your care is well coordinated. You ll be introduced to a PAL in an  upcoming visit.     Expanded care team access with tailored appointment lengths  Depending on your health care needs, you may have longer or shorter appointments and see additional care team providers - including Medication Therapy Management (MTM) pharmacists, diabetes educators, behavioral health clinicians, or social workers. At times, they may be included in your visit with your provider, or you may see them individually.     Navigating [facility] and [facility] resources   We partner with [facility] and [facility] [employer] to help patients overcome challenges that can make it hard to get health care, including financial hardship, transportation or mobility concerns.     Online access to your health care records and care team  [employer] is our online tool that makes it easy to see your health care information and communicate with your care team.   [employer] allows you to:     View your health maintenance plan so you know when you re due for a preventive screening    Message your PAL or care team     View your health history and visit summaries     Schedule appointments     Complete questionnaires and eCheck-in before appointments      Get care for minor conditions from your provider with an e-visit     View and pay your bill     Sign up at [email or web address]. Once you have an account, you also can download the mobile calli.     Connecting to fast and convenient care  When you need fast, convenient care - consider one of the following options:       Video Visit: A convenient care option for visiting with your provider out of the comfort of your own home. Most of the things you come to the clinic to address with your provider can now be done virtually through a video. This includes your chronic medication follow up, questions or concerns you may have, and even your annual Medicare Wellness Visit.       Phone Visit: Another convenient option for follow up of common problems that may require a more in-depth  discussion with your provider.       E-visit: When you need acute care quickly, or have a quick question about your medication, an E-visit is completed through [employer] and your provider will respond within one business day.

## 2021-03-24 NOTE — PROGRESS NOTES
Assessment & Plan     Alcoholism (H)  I counseled patient on the dangers of alcohol withdrawal, which can be life-threatening.  He is aware of the symptoms of alcohol withdrawal syndrome, and he will plan on seeking help in a hospital if needed prior to him starting in patient treatment.    Chronic allergic rhinitis    - fexofenadine (ALLEGRA) 180 MG tablet  Dispense: 90 tablet; Refill: 3  - fluticasone (FLONASE) 50 MCG/ACT nasal spray  Dispense: 16 g; Refill: 2    Persistent insomnia    - QUEtiapine (SEROQUEL) 100 MG tablet  Dispense: 90 tablet; Refill: 0    Situational anxiety    - hydrOXYzine (VISTARIL) 50 MG capsule  Dispense: 90 capsule; Refill: 0  - QUEtiapine (SEROQUEL) 100 MG tablet  Dispense: 90 tablet; Refill: 0    Elevated blood pressure reading without diagnosis of hypertension  Today's blood pressure elevation may be related to patient's alcohol consumption.  No new long-term treatment recommended for now.        30 minutes spent on the date of the encounter doing chart review, history and exam, documentation and further activities as noted above       Tobacco Cessation:   reports that he has been smoking cigarettes. He has been smoking about 0.50 packs per day. He has never used smokeless tobacco.          Return in about 3 months (around 6/24/2021) for Routine preventive.    Louis Browning DO  [facility]    Phoenix Grimm is a 60 year old who presents for the following health issues     History of Present Illness       Mental Health Follow-up:  Patient presents to follow-up on Anxiety.    Patient's anxiety since last visit has been:  Medium  The patient is having other symptoms associated with anxiety.  Any significant life events: grief or loss  Patient is feeling anxious or having panic attacks.  Patient has concerns about alcohol or drug use.     Social History  Tobacco Use    Smoking status: Current Every Day Smoker      Packs/day: 0.50      Types: Cigarettes     Smokeless tobacco: Never Used    Tobacco comment: quit date 10/16/17  Alcohol use: Yes    Alcohol/week: 20.0 standard drinks    Types: 20 Cans of beer per week  Drug use: No      Today's PHQ-9         PHQ-9 Total Score:         PHQ-9 Q9 Thoughts of better off dead/self-harm past 2 weeks :       Thoughts of suicide or self harm:      Self-harm Plan:        Self-harm Action:          Safety concerns for self or others:           Heart Failure:  He presents for follow up of heart failure. He is not experiencing shortness of breath at night, with rest or with activity He is experiencing lower extremity edema which is same as usual. He has orthopenea and is not coughing at night. Patient is not checking weight daily. He has recently had a None. He has no side effects from medications.  He has has a medical visit for heart failure 1 time since the last visit.    Hypertension: He presents for follow up of hypertension.  He does check blood pressure  regularly outside of the clinic. Outpatient blood pressures have not been over 140/90. He follows a low salt diet.     He eats 2-3 servings of fruits and vegetables daily.He consumes 2 sweetened beverage(s) daily.He exercises with enough effort to increase his heart rate 9 or less minutes per day.  He exercises with enough effort to increase his heart rate 3 or less days per week. He is missing 2 dose(s) of medications per week.  He is not taking prescribed medications regularly due to remembering to take.       Abnormal Mood Symptoms  Onset/Duration: years  Description: anxiety  Depression (if yes, do PHQ-9): no  Anxiety (if yes, do KANDACE-7): YES  Accompanying Signs & Symptoms:  Still participating in activities that you used to enjoy: YES  Fatigue: no  Irritability: no  Difficulty concentrating: no  Changes in appetite: YES  Problems with sleep: YES  Heart racing/beating fast: no  Abnormally elevated, expansive, or irritable mood: no  Persistently increased activity or energy:  "no  Thoughts of hurting yourself or others: no  History:  Recent stress or major life event: YES  Prior depression or anxiety: yes   Family history of depression or anxiety: YES  Alcohol/drug use: YES  Difficulty sleeping: YES  Precipitating or alleviating factors: staying busy helps  Therapies tried and outcome: group therapy and medication    No flowsheet data found.  KANDACE-7 SCORE 6/1/2020 3/24/2021   Total Score - 3 (minimal anxiety)   Total Score 1 3         Review of Systems   Patient is seen for discussion of his medication management prior to him starting inpatient treatment for alcoholism.    Patient is going to start in-patient treatment for alcoholism within the next week. He will be at [facility] in [place], and he suspects he will have to go somewhere for detox next week. He last drank alcohol this morning.      His best friend got hit and killed by a car recently he thinks this has made him more anxious, though he feels he should be able to cope well enough so long as he has something to take as needed for anxiety.    At exam, he does not have any urgent physical concerns.     He last took atorvastatin about 4 months ago, which was stopped by heart doctor per patient.  Review of his labs shows he has had elevated liver enzymes in the past, which I suspect was related to this decision.    He has been needing 2-3 50 mg tablets of quetiapine to sleep.  He was agreeable to my suggestion that we adjust his prescription to being 100 mg tablets.    He has taken hydroxyzine in the past for anxiety treatment as needed, and he would like a prescription for this.    He has had prior success treating his allergic rhinitis with Allegra and Flonase nasal spray.    He lives in the area with his wife and children.  He feel he has good support emotionally at home.        Objective    BP (!) 160/82 (Cuff Size: Adult Regular)   Pulse 86   Temp 98.1  F (36.7  C) (Oral)   Resp 16   Ht 1.708 m (5' 7.25\")   Wt 81.6 " kg (180 lb)   SpO2 98%   BMI 27.98 kg/m    Body mass index is 27.98 kg/m .  Physical Exam   Vital signs reviewed.  He has mild hypertension at time of exam.  As recently as this past October it was in the normal range.  Patient is in no acute appearing distress.  Breathing appears nonlabored.  Patient is alert and oriented ×3.  Patient is very pleasant, making good eye contact and responding with clear fluent speech.    ENT: Ear exam shows clear TMs without injection or bulging bilaterally, nasal turbinates are injected and edematous with increased mucoid rhinorrhea, no pharyngeal injection.    Neck: supple with no adenoapthy, palpable abnormal masses, or thyroid abnormality.    Heart: Heart rate is regular without murmur.  Lungs: Lungs are clear to auscultation with good airflow bilaterally.    Skin/extremities: Warm and dry, with no edema noted.              Answers for HPI/ROS submitted by the patient on 3/24/2021   Chronic problems general questions HPI Form  KANDACE 7 TOTAL SCORE: 3

## 2021-03-25 ASSESSMENT — ANXIETY QUESTIONNAIRES: GAD7 TOTAL SCORE: 3

## 2021-07-16 ENCOUNTER — HOSPITAL ENCOUNTER (EMERGENCY)
Facility: CLINIC | Age: 61
Discharge: LEFT BEFORE DISCHARGE | End: 2021-07-16
Payer: COMMERCIAL

## 2021-07-16 ENCOUNTER — TELEPHONE (OUTPATIENT)
Dept: FAMILY MEDICINE | Facility: CLINIC | Age: 61
End: 2021-07-16

## 2021-07-16 VITALS
HEART RATE: 105 BPM | WEIGHT: 197 LBS | TEMPERATURE: 98.5 F | RESPIRATION RATE: 16 BRPM | DIASTOLIC BLOOD PRESSURE: 82 MMHG | BODY MASS INDEX: 30.63 KG/M2 | OXYGEN SATURATION: 99 % | SYSTOLIC BLOOD PRESSURE: 136 MMHG

## 2021-07-16 NOTE — ED TRIAGE NOTES
Patient complaining of rash/wound to top of head that comes and goes over the past 3 months.      Patient states he is also out of his anxiety meds and his sleeping pills and needs a refill.      ABCs intact.  Alert and oriented x 4.

## 2021-07-16 NOTE — TELEPHONE ENCOUNTER
Reason for call:  Other   Patient called regarding (reason for call): call back  Additional comments: Patient is calling for refills on medication, but does not remember the name. Patient thinks it is hydrOXYzine (VISTARIL) 50 MG for anxiety and diphenhydrAMINE (BENADRYL) 50 MG for allergies. Please call patient to confirm. Please send to     [address] [place] [address]    Phone number to reach patient:  Home number on file [phone] (home)    Best Time:  Any time    Can we leave a detailed message on this number?  YES    Travel screening: Not Applicable

## 2021-07-19 DIAGNOSIS — G47.00 PERSISTENT INSOMNIA: ICD-10-CM

## 2021-07-19 DIAGNOSIS — F41.8 SITUATIONAL ANXIETY: ICD-10-CM

## 2021-07-19 NOTE — TELEPHONE ENCOUNTER
Pt scheduled for virtual visit with PT as he is on house arrest and unable to come in for visit.     Scheduled for visit tomorrow   Lianne Sanchez RN

## 2021-07-20 ENCOUNTER — VIRTUAL VISIT (OUTPATIENT)
Dept: FAMILY MEDICINE | Facility: CLINIC | Age: 61
End: 2021-07-20
Payer: COMMERCIAL

## 2021-07-20 DIAGNOSIS — G47.00 PERSISTENT INSOMNIA: ICD-10-CM

## 2021-07-20 DIAGNOSIS — F41.9 CHRONIC ANXIETY: ICD-10-CM

## 2021-07-20 PROCEDURE — 99214 OFFICE O/P EST MOD 30 MIN: CPT | Mod: 95 | Performed by: FAMILY MEDICINE

## 2021-07-20 RX ORDER — HYDROXYZINE PAMOATE 50 MG/1
CAPSULE ORAL
Qty: 180 CAPSULE | Refills: 0 | Status: SHIPPED | OUTPATIENT
Start: 2021-07-20 | End: 2022-12-19

## 2021-07-20 RX ORDER — QUETIAPINE FUMARATE 100 MG/1
100 TABLET, FILM COATED ORAL AT BEDTIME
Qty: 90 TABLET | Refills: 0 | Status: SHIPPED | OUTPATIENT
Start: 2021-07-20 | End: 2021-09-23

## 2021-07-20 RX ORDER — QUETIAPINE FUMARATE 100 MG/1
100 TABLET, FILM COATED ORAL AT BEDTIME
Qty: 90 TABLET | Refills: 0 | OUTPATIENT
Start: 2021-07-20

## 2021-07-20 RX ORDER — DIPHENHYDRAMINE HCL 50 MG
50 CAPSULE ORAL
Qty: 90 CAPSULE | Refills: 0 | Status: CANCELLED | OUTPATIENT
Start: 2021-07-20

## 2021-07-20 NOTE — PROGRESS NOTES
"Alfa is a 61 year old who is being evaluated via a billable telephone visit.      What phone number would you like to be contacted at? [phone]  How would you like to obtain your AVS? MyChart    Assessment & Plan     Persistent insomnia    - QUEtiapine (SEROQUEL) 100 MG tablet  Dispense: 90 tablet; Refill: 0  - hydrOXYzine (VISTARIL) 50 MG capsule  Dispense: 180 capsule; Refill: 0    Chronic anxiety    - QUEtiapine (SEROQUEL) 100 MG tablet  Dispense: 90 tablet; Refill: 0  - hydrOXYzine (VISTARIL) 50 MG capsule  Dispense: 180 capsule; Refill: 0               Tobacco Cessation:   reports that he has been smoking cigarettes. He has been smoking about 0.50 packs per day. He has never used smokeless tobacco.      BMI:   Estimated body mass index is 30.63 kg/m  as calculated from the following:    Height as of 3/24/21: 1.708 m (5' 7.25\").    Weight as of 7/16/21: 89.4 kg (197 lb).           No follow-ups on file.    Louis Browning DO  [facility]    Subjective   Alfa is a 61 year old who presents for the following health issues     HPI     Anxiety Follow-Up    How are you doing with your anxiety since your last visit? No change    Are you having other symptoms that might be associated with anxiety? Yes:  sleeping    Have you had a significant life event? Grief or Loss     Are you feeling depressed? No    Do you have any concerns with your use of alcohol or other drugs? No    Social History     Tobacco Use     Smoking status: Current Every Day Smoker     Packs/day: 0.50     Types: Cigarettes     Smokeless tobacco: Never Used     Tobacco comment: quit date 10/16/17   Substance Use Topics     Alcohol use: Yes     Alcohol/week: 20.0 standard drinks     Types: 20 Cans of beer per week     Drug use: No     KANDACE-7 SCORE 6/1/2020 3/24/2021   Total Score - 3 (minimal anxiety)   Total Score 1 3     No flowsheet data found.        How many servings of fruits and vegetables do you eat daily?  2-3    On " "average, how many sweetened beverages do you drink each day (Examples: soda, juice, sweet tea, etc.  Do NOT count diet or artificially sweetened beverages)?   1    How many days per week do you exercise enough to make your heart beat faster? 7    How many minutes a day do you exercise enough to make your heart beat faster? 60 or more    How many days per week do you miss taking your medication? 0        Review of Systems   Check in questions and answers reviewed. Patient is seen for chief concern of needing some medication refills to help him sleep.    Patient had difficulty connecting for the video visit, so visit was changed from a video visit to a telephone visit.    Patient would like a refill of his Seroquel, hydroxyzine, and Benadryl.  He states he is taking all 3 of these medications to help him sleep.  He also uses the hydroxyzine periodically to help with anxiety.  I cautioned him that taking all 3 of these medications may be risky, and recommended stopping the Benadryl and potentially increasing the hydroxyzine dosage from 50 mg to 100 mg if needed.  He was agreeable to trying this.    Patient states he completed treatment for alcohol use disorder and is doing well overall at time of telephone visit.    I reviewed the follow-up lab studies and preventive health studies and exams patient is due for.  He states he will try to come in for a visit to address these.        Objective    Vitals - Patient Reported  Weight (Patient Reported): 88.5 kg (195 lb)  Height (Patient Reported): 170.2 cm (5' 7\")  BMI (Based on Pt Reported Ht/Wt): 30.54        Physical Exam   alert and no distress  PSYCH: Alert and oriented times 3; coherent speech, normal   rate and volume, able to articulate logical thoughts, able   to abstract reason, no tangential thoughts, no hallucinations   or delusions  His affect is normal and pleasant  RESP: No cough, no audible wheezing, able to talk in full sentences  Remainder of exam unable to " be completed due to telephone visits                Phone call duration: 20 minutes

## 2021-07-20 NOTE — TELEPHONE ENCOUNTER
Pt has appointment today, routed to PT, please process refill at visit today  Chetna Vu RN, BSN  Message handled by CLINIC NURSE.

## 2021-07-29 ENCOUNTER — TELEPHONE (OUTPATIENT)
Dept: CARDIOLOGY | Facility: CLINIC | Age: 61
End: 2021-07-29

## 2021-07-30 DIAGNOSIS — I21.11 ST ELEVATION MYOCARDIAL INFARCTION INVOLVING RIGHT CORONARY ARTERY (H): Primary | ICD-10-CM

## 2021-07-30 DIAGNOSIS — F17.200 TOBACCO DEPENDENCE SYNDROME: ICD-10-CM

## 2021-07-30 DIAGNOSIS — I10 BENIGN ESSENTIAL HYPERTENSION: ICD-10-CM

## 2021-07-30 DIAGNOSIS — I21.3 ST ELEVATION MYOCARDIAL INFARCTION (STEMI), UNSPECIFIED ARTERY (H): ICD-10-CM

## 2021-08-03 ENCOUNTER — TELEPHONE (OUTPATIENT)
Dept: FAMILY MEDICINE | Facility: CLINIC | Age: 61
End: 2021-08-03

## 2021-08-03 NOTE — TELEPHONE ENCOUNTER
MTM referral from: [facility] visit (referral by provider)    MTM referral outreach attempt #2 on August 3, 2021 at 1:38 PM      Outcome: Patient not reachable after several attempts, will route to MTM Pharmacist/Provider as an FYI. Thank you for the referral.    Wili Arana, MTM coordinator

## 2021-08-13 ENCOUNTER — TELEPHONE (OUTPATIENT)
Dept: CARDIOLOGY | Facility: CLINIC | Age: 61
End: 2021-08-13

## 2021-08-13 NOTE — TELEPHONE ENCOUNTER
[place] [facility] MEASURES BLOOD PRESSURE RECHECK      Last office visit: 3/24/21    Previous blood pressure: 160/82 mm Hg  Previous heart rate: 86 bpm      Home monitor blood pressure: 129/77 mmHg  Home monitor heart rate: n/a bpm      Additional Comments: n/a      Results routed to: n/a

## 2021-09-04 ENCOUNTER — HEALTH MAINTENANCE LETTER (OUTPATIENT)
Age: 61
End: 2021-09-04

## 2021-09-23 ENCOUNTER — OFFICE VISIT (OUTPATIENT)
Dept: FAMILY MEDICINE | Facility: CLINIC | Age: 61
End: 2021-09-23
Payer: COMMERCIAL

## 2021-09-23 VITALS
RESPIRATION RATE: 18 BRPM | TEMPERATURE: 98.4 F | BODY MASS INDEX: 29.82 KG/M2 | OXYGEN SATURATION: 97 % | HEART RATE: 60 BPM | DIASTOLIC BLOOD PRESSURE: 78 MMHG | WEIGHT: 190 LBS | SYSTOLIC BLOOD PRESSURE: 130 MMHG | HEIGHT: 67 IN

## 2021-09-23 DIAGNOSIS — G47.00 PERSISTENT INSOMNIA: ICD-10-CM

## 2021-09-23 DIAGNOSIS — J44.9 CHRONIC OBSTRUCTIVE PULMONARY DISEASE, UNSPECIFIED COPD TYPE (H): ICD-10-CM

## 2021-09-23 DIAGNOSIS — F41.9 CHRONIC ANXIETY: ICD-10-CM

## 2021-09-23 DIAGNOSIS — F10.11 MILD ALCOHOL USE DISORDER, IN SUSTAINED REMISSION: ICD-10-CM

## 2021-09-23 DIAGNOSIS — I42.9 CARDIOMYOPATHY, UNSPECIFIED TYPE (H): Primary | ICD-10-CM

## 2021-09-23 PROCEDURE — 80053 COMPREHEN METABOLIC PANEL: CPT | Performed by: FAMILY MEDICINE

## 2021-09-23 PROCEDURE — 90682 RIV4 VACC RECOMBINANT DNA IM: CPT | Performed by: FAMILY MEDICINE

## 2021-09-23 PROCEDURE — 99214 OFFICE O/P EST MOD 30 MIN: CPT | Mod: 25 | Performed by: FAMILY MEDICINE

## 2021-09-23 PROCEDURE — 36415 COLL VENOUS BLD VENIPUNCTURE: CPT | Performed by: FAMILY MEDICINE

## 2021-09-23 PROCEDURE — G0008 ADMIN INFLUENZA VIRUS VAC: HCPCS | Performed by: FAMILY MEDICINE

## 2021-09-23 RX ORDER — QUETIAPINE FUMARATE 100 MG/1
100 TABLET, FILM COATED ORAL AT BEDTIME
Qty: 90 TABLET | Refills: 1 | Status: SHIPPED | OUTPATIENT
Start: 2021-09-23 | End: 2021-11-08

## 2021-09-23 ASSESSMENT — MIFFLIN-ST. JEOR: SCORE: 1635.46

## 2021-09-23 NOTE — LETTER
Patient:  Alfa Cm  :   1960        2021    Patient Name:  Alfa Cm    Physician: Louisa Hurley MD    Patient is under my care .  Patient is now in remission of alcohol use .  Patient likely has COPD due to tobacco abuse ( pending testing ) .  He is currently on inhaler .  Patient is unable to perform breathing test to start the car due to limited expiration capacity .  Please use alternative means like blood alcohol level to monitor his remission .      Sincerely,       Louisa Hurley MD

## 2021-09-23 NOTE — PROGRESS NOTES
Assessment & Plan     Cardiomyopathy, unspecified type (H)  - last echo in 2018 ejection fraction 35%.  -Currently no new symptoms of shortness of breath or leg edema  -Recommend to continue with lisinopril, carvedilol, aspirin  Will proceed with echocardiogram  We will also schedule physical appointment.  - Echocardiogram Complete; Future    Persistent insomnia  Has noticed improvement with the use of Seroquel, currently denies any side effect we will continue to monitor cholesterol and fasting glucose-  - QUEtiapine (SEROQUEL) 100 MG tablet; Take 1 tablet (100 mg) by mouth At Bedtime    Chronic anxiety  -We will continue to closely monitor fasting glucose, lipid panel.  - QUEtiapine (SEROQUEL) 100 MG tablet; Take 1 tablet (100 mg) by mouth At Bedtime    Chronic obstructive pulmonary disease, unspecified COPD type (H)  -30+ years of smoking, has noticed difficulty with expiration,  -We will obtain pulmonary function test will also start radiology.  - Fluticasone-Umeclidin-Vilanterol (TRELEGY ELLIPTA) 100-62.5-25 MCG/INH oral inhaler; Inhale 1 puff into the lungs daily  - Pulmonary Function Test; Future    Mild alcohol use disorder, in sustained remission  -Patient is currently in remission,    He described he was unable to do breath test every morning before he starts his car due to limited expiration, letter provided to provide another means of monitoring we will also obtain his blood alcohol level today we will continue to monitor kidney function and liver enzymes.  - BLOOD ALCOHOL COLLECTION  - Comprehensive metabolic panel (BMP + Alb, Alk Phos, ALT, AST, Total. Bili, TP)    0956}     Tobacco Cessation:   reports that he has been smoking cigarettes. He has been smoking about 0.50 packs per day. He has never used smokeless tobacco.  Tobacco Cessation Action Plan: Self help information given to patient    BMI:   Estimated body mass index is 29.76 kg/m  as calculated from the following:    Height as of this  "encounter: 1.702 m (5' 7\").    Weight as of this encounter: 86.2 kg (190 lb).   Weight management plan: Discussed healthy diet and exercise guidelines        Return in about 6 weeks (around 11/4/2021) for Routine preventive.    Louisa Hurley MD  [facility] ALESSANDRO Grimm is a 61 year old who presents for the following health issues     History of Present Illness       He eats 2-3 servings of fruits and vegetables daily.He consumes 2 sweetened beverage(s) daily.He exercises with enough effort to increase his heart rate 9 or less minutes per day.  He exercises with enough effort to increase his heart rate 3 or less days per week. He is missing 2 dose(s) of medications per week.       Medication Followup of all medications    Taking Medication as prescribed: yes    Side Effects:  None    Medication Helping Symptoms:  yes         Review of Systems         Objective    /78 (BP Location: Right arm, Patient Position: Sitting, Cuff Size: Adult Large)   Pulse 60   Temp 98.4  F (36.9  C) (Oral)   Resp 18   Ht 1.702 m (5' 7\")   Wt 86.2 kg (190 lb)   SpO2 97%   BMI 29.76 kg/m    Body mass index is 29.76 kg/m .  Physical Exam                   "

## 2021-09-24 DIAGNOSIS — J44.9 CHRONIC OBSTRUCTIVE PULMONARY DISEASE, UNSPECIFIED COPD TYPE (H): Primary | ICD-10-CM

## 2021-09-24 LAB
ALBUMIN SERPL-MCNC: 3.6 G/DL (ref 3.4–5)
ALP SERPL-CCNC: 121 U/L (ref 40–150)
ALT SERPL W P-5'-P-CCNC: 13 U/L (ref 0–70)
ANION GAP SERPL CALCULATED.3IONS-SCNC: 2 MMOL/L (ref 3–14)
AST SERPL W P-5'-P-CCNC: 14 U/L (ref 0–45)
BILIRUB SERPL-MCNC: 1 MG/DL (ref 0.2–1.3)
BUN SERPL-MCNC: 13 MG/DL (ref 7–30)
CALCIUM SERPL-MCNC: 8.9 MG/DL (ref 8.5–10.1)
CHLORIDE BLD-SCNC: 107 MMOL/L (ref 94–109)
CO2 SERPL-SCNC: 28 MMOL/L (ref 20–32)
CREAT SERPL-MCNC: 0.97 MG/DL (ref 0.66–1.25)
GFR SERPL CREATININE-BSD FRML MDRD: 85 ML/MIN/1.73M2
GLUCOSE BLD-MCNC: 106 MG/DL (ref 70–99)
POTASSIUM BLD-SCNC: 3.7 MMOL/L (ref 3.4–5.3)
PROT SERPL-MCNC: 8.2 G/DL (ref 6.8–8.8)
SODIUM SERPL-SCNC: 137 MMOL/L (ref 133–144)

## 2021-09-30 ENCOUNTER — HOSPITAL ENCOUNTER (OUTPATIENT)
Dept: RESPIRATORY THERAPY | Facility: CLINIC | Age: 61
End: 2021-09-30
Attending: FAMILY MEDICINE
Payer: COMMERCIAL

## 2021-09-30 ENCOUNTER — APPOINTMENT (OUTPATIENT)
Dept: LAB | Facility: CLINIC | Age: 61
End: 2021-09-30
Attending: FAMILY MEDICINE
Payer: COMMERCIAL

## 2021-09-30 DIAGNOSIS — J44.9 CHRONIC OBSTRUCTIVE PULMONARY DISEASE, UNSPECIFIED COPD TYPE (H): ICD-10-CM

## 2021-09-30 LAB — HGB BLD-MCNC: 12.9 G/DL (ref 13.3–17.7)

## 2021-09-30 PROCEDURE — 94060 EVALUATION OF WHEEZING: CPT

## 2021-09-30 PROCEDURE — 85018 HEMOGLOBIN: CPT | Performed by: FAMILY MEDICINE

## 2021-09-30 PROCEDURE — 36415 COLL VENOUS BLD VENIPUNCTURE: CPT | Performed by: FAMILY MEDICINE

## 2021-09-30 PROCEDURE — 94726 PLETHYSMOGRAPHY LUNG VOLUMES: CPT

## 2021-09-30 PROCEDURE — 250N000009 HC RX 250

## 2021-09-30 PROCEDURE — 94729 DIFFUSING CAPACITY: CPT

## 2021-09-30 RX ORDER — ALBUTEROL SULFATE 0.83 MG/ML
SOLUTION RESPIRATORY (INHALATION)
Status: COMPLETED
Start: 2021-09-30 | End: 2021-09-30

## 2021-09-30 RX ADMIN — ALBUTEROL SULFATE 2.5 MG: 2.5 SOLUTION RESPIRATORY (INHALATION) at 16:03

## 2021-09-30 NOTE — PROGRESS NOTES
Patient completed pulmonary function testing with pre/post spirometry, lung volumes and diffusion. Good patient effort and cooperation. The results of this test met the ATS standards for acceptability and repeatability, except for DLCO. DLCO did not meet ATS due to IV<90%VC. The average of two consistent results recorded. Albuterol neb 2.5 mg given for bronchodilation.

## 2021-10-01 LAB
DLCOCOR-%PRED-PRE: 49 %
DLCOCOR-PRE: 12.42 ML/MIN/MMHG
DLCOUNC-%PRED-PRE: 46 %
DLCOUNC-PRE: 11.78 ML/MIN/MMHG
DLCOUNC-PRED: 25.31 ML/MIN/MMHG
ERV-%PRED-PRE: 65 %
ERV-PRE: 0.58 L
ERV-PRED: 0.89 L
EXPTIME-PRE: 5.43 SEC
FEF2575-%PRED-POST: 62 %
FEF2575-%PRED-PRE: 60 %
FEF2575-POST: 1.68 L/SEC
FEF2575-PRE: 1.62 L/SEC
FEF2575-PRED: 2.69 L/SEC
FEFMAX-%PRED-PRE: 66 %
FEFMAX-PRE: 5.73 L/SEC
FEFMAX-PRED: 8.63 L/SEC
FEV1-%PRED-PRE: 54 %
FEV1-PRE: 1.69 L
FEV1FEV6-PRE: 82 %
FEV1FEV6-PRED: 79 %
FEV1FVC-PRE: 82 %
FEV1FVC-PRED: 79 %
FEV1SVC-PRE: 80 %
FEV1SVC-PRED: 68 %
FIFMAX-PRE: 3.73 L/SEC
FRCPLETH-%PRED-PRE: 66 %
FRCPLETH-PRE: 2.29 L
FRCPLETH-PRED: 3.42 L
FVC-%PRED-PRE: 53 %
FVC-PRE: 2.06 L
FVC-PRED: 3.88 L
GAW-%PRED-PRE: 83 %
GAW-PRE: 0.86 L/S/CMH2O
GAW-PRED: 1.03 L/S/CMH2O
IC-%PRED-PRE: 42 %
IC-PRE: 1.52 L
IC-PRED: 3.6 L
RVPLETH-%PRED-PRE: 74 %
RVPLETH-PRE: 1.71 L
RVPLETH-PRED: 2.3 L
SGAW-%PRED-PRE: 398 %
SGAW-PRE: 0.33 1/CMH2O*S
SGAW-PRED: 0.08 1/CMH2O*S
SRAW-%PRED-PRE: 63 %
SRAW-PRE: 3.01 CMH2O*S
SRAW-PRED: 4.76 CMH2O*S
TLCPLETH-%PRED-PRE: 58 %
TLCPLETH-PRE: 3.81 L
TLCPLETH-PRED: 6.52 L
VA-%PRED-PRE: 57 %
VA-PRE: 3.36 L
VC-%PRED-PRE: 46 %
VC-PRE: 2.1 L
VC-PRED: 4.49 L

## 2021-10-02 ENCOUNTER — TELEPHONE (OUTPATIENT)
Dept: NURSING | Facility: CLINIC | Age: 61
End: 2021-10-02

## 2021-10-02 ENCOUNTER — MYC MEDICAL ADVICE (OUTPATIENT)
Dept: FAMILY MEDICINE | Facility: CLINIC | Age: 61
End: 2021-10-02

## 2021-10-02 NOTE — LETTER
October 4, 2021      Alfa Cm  [address]   [facility] [address]        To Whom It May Concern,      Please see the attached pulmonary function test result regarding request for accomodation with ignition interlock device.           Sincerely,        Louisa Hurley MD

## 2021-10-02 NOTE — TELEPHONE ENCOUNTER
He was seen yesterday for a breathing test, and he is calling to request a letter from his PCP for his insurance.  I suggested he send her a message on my chart with all the specifics he needs to be in the letter and then give them a few days to work on it.  Call back on Wednesday afternoon to follow up on the letter.    Patient verbalized understanding and agrees with plan.    Zahira Vidal Nurse Triage Advisor 2:13 PM 10/2/2021

## 2021-10-04 NOTE — TELEPHONE ENCOUNTER
Please see patient MyChart message and advise. Last OV w/ A.J. 9/23/21, per notes: Mild alcohol use disorder, in sustained remission  -Patient is currently in remission,  He described he was unable to do breath test every morning before he starts his car due to limited expiration, letter provided to provide another means of monitoring we will also obtain his blood alcohol level today we will continue to monitor kidney function and liver enzymes.    Patient requesting letter from physician verifying extent of patient's diminished lung capacity and forced vital capacity in order to receive special accomodation with ignition interlock device.      Urbano KUO RN

## 2021-10-04 NOTE — TELEPHONE ENCOUNTER
[employer] message marked not read at this time. Mailed letter and PFT result to patient for their records.     Urbano KUO RN

## 2021-10-06 ENCOUNTER — MYC MEDICAL ADVICE (OUTPATIENT)
Dept: FAMILY MEDICINE | Facility: CLINIC | Age: 61
End: 2021-10-06

## 2021-10-07 DIAGNOSIS — Z72.0 TOBACCO ABUSE: Primary | ICD-10-CM

## 2021-10-07 RX ORDER — NICOTINE 21 MG/24HR
1 PATCH, TRANSDERMAL 24 HOURS TRANSDERMAL EVERY 24 HOURS
Qty: 30 PATCH | Refills: 1 | Status: SHIPPED | OUTPATIENT
Start: 2021-10-07 | End: 2021-11-08

## 2021-10-07 NOTE — TELEPHONE ENCOUNTER
Please see pt's my chart message, asking about recommendations to stop smoking.     Please advise    Marnie LOOMIS RN

## 2021-10-07 NOTE — TELEPHONE ENCOUNTER
I did place quit plan order, patient letter to be faxed please fax those smoking cessation clinic orders.   I also sent patches to the pharmacy, hopefully his insurance covers that.   Thanks   Louisa           faxed orders to [facility]. Fax #1 [phone]    Called pt to relay pcp message. Pt expressed understanding and acceptance of plan, with no further questions at the moment. Advised to call back with any questions or concerns.       Marnie LOOMIS RN

## 2021-10-20 ENCOUNTER — MYC MEDICAL ADVICE (OUTPATIENT)
Dept: FAMILY MEDICINE | Facility: CLINIC | Age: 61
End: 2021-10-20

## 2021-10-20 RX ORDER — MULTIVITAMIN
1 TABLET ORAL DAILY
Status: CANCELLED | OUTPATIENT
Start: 2021-10-20

## 2021-10-20 NOTE — TELEPHONE ENCOUNTER
Alfa returned call to me, I gave him number for scheduling echo and he is going to call them right away.     Also scheduled annual preventative. Direct number given to Alfa to call if he has any issues with scheduling or needs any further assistance.     Adry Monae R.N.

## 2021-10-20 NOTE — TELEPHONE ENCOUNTER
Call placed to Alfa to set up annual wellness visit in early November and assist him in getting echo scheduled as it looks like cardiac schedulers reached out x 2 to schedule. They may have used a number he doesn't check--will verify with patient.  Left message to return call to me and also sent [employer] message. Adry Monae R.N.

## 2021-10-20 NOTE — PROGRESS NOTES
Pre-Visit Planning   Next 5 appointments (look out 90 days)    Nov 08, 2021  1:30 PM  (Arrive by 1:10 PM)  Office Visit with Louisa Hurley MD  [facility] ([facility] ) [address] [address] [phone]        Appointment Notes for this encounter:   Annual wellness    Questionnaires Reviewed/Assigned  No additional questionnaires are needed    Patient preferred phone number: [phone]    Contacted patient via phone/MyChart. Are there any additional questions or concerns you'd like to review with your provider during your visit? Yes: rash on top of head, itchy.     Visit is preventive. Reviewed purpose of preventive visit with patient. Reviewed split billing.     Meds  Home Meds reviewed and updated Alfa would like Rx for multivitamin.     Entered patient-preferred pharmacy.     [id number] [id number]      Current Outpatient Medications   Medication     albuterol (PROAIR HFA/PROVENTIL HFA/VENTOLIN HFA) 108 (90 BASE) MCG/ACT Inhaler     alcohol swab prep pads     aspirin 81 MG tablet     blood glucose calibration (NO BRAND SPECIFIED) solution     blood glucose monitoring (NO BRAND SPECIFIED) meter device kit     blood glucose monitoring (NO BRAND SPECIFIED) test strip     carvedilol (COREG) 25 MG tablet     fexofenadine (ALLEGRA) 180 MG tablet     fluticasone (FLONASE) 50 MCG/ACT nasal spray     Fluticasone-Umeclidin-Vilanterol (TRELEGY ELLIPTA) 100-62.5-25 MCG/INH oral inhaler     hydrOXYzine (VISTARIL) 50 MG capsule     lisinopril (ZESTRIL) 20 MG tablet     multivitamin, therapeutic (THERA-VIT) TABS tablet     nicotine (NICODERM CQ) 14 MG/24HR 24 hr patch     order for DME     QUEtiapine (SEROQUEL) 100 MG tablet     thin (NO BRAND SPECIFIED) lancets     No current facility-administered medications for this visit.       Health Maintenance   Health Maintenance Due   Topic Date Due     HF ACTION PLAN  Never  done     TSH W/FREE T4 REFLEX  Never done     ANNUAL REVIEW OF HM ORDERS  Never done     ADVANCE CARE PLANNING  Never done     COPD ACTION PLAN  Never done     COLORECTAL CANCER SCREENING  Never done     HIV SCREENING  Never done     MEDICARE ANNUAL WELLNESS VISIT  Never done     HEPATITIS C SCREENING  Never done     ZOSTER IMMUNIZATION (1 of 2) Never done     LIPID  10/31/2018     CBC  03/04/2020       Health Maintenance reviewed and Health Maintenance orders pended    [employer]  Patient is active on [employer].    Questionnaire Review   Not due  Call Summary  Advised patient to call back at [phone] if needed. Thank you for your time today.   PVP complete.

## 2021-10-26 ENCOUNTER — MYC MEDICAL ADVICE (OUTPATIENT)
Dept: FAMILY MEDICINE | Facility: CLINIC | Age: 61
End: 2021-10-26
Payer: COMMERCIAL

## 2021-10-26 NOTE — TELEPHONE ENCOUNTER
Nanovi message sent to Alfa. He has not yet scheduled echo. I had spoken to him on the phone last week. Will continue to monitor, have offered to schedule this for him if he would prefer.     Of note Alfa also has upcoming appointment in about 2 weeks.     Adry Monae R.N., TINO

## 2021-11-08 ENCOUNTER — OFFICE VISIT (OUTPATIENT)
Dept: FAMILY MEDICINE | Facility: CLINIC | Age: 61
End: 2021-11-08
Payer: COMMERCIAL

## 2021-11-08 VITALS
DIASTOLIC BLOOD PRESSURE: 75 MMHG | RESPIRATION RATE: 14 BRPM | TEMPERATURE: 98.1 F | HEIGHT: 67 IN | BODY MASS INDEX: 29.82 KG/M2 | SYSTOLIC BLOOD PRESSURE: 121 MMHG | WEIGHT: 190 LBS | HEART RATE: 64 BPM

## 2021-11-08 DIAGNOSIS — Z13.220 SCREENING FOR HYPERLIPIDEMIA: ICD-10-CM

## 2021-11-08 DIAGNOSIS — Z12.5 SCREENING FOR PROSTATE CANCER: ICD-10-CM

## 2021-11-08 DIAGNOSIS — I50.22 CHRONIC SYSTOLIC HEART FAILURE (H): Primary | ICD-10-CM

## 2021-11-08 DIAGNOSIS — L30.9 DERMATITIS: ICD-10-CM

## 2021-11-08 DIAGNOSIS — E78.5 HYPERLIPIDEMIA LDL GOAL <100: ICD-10-CM

## 2021-11-08 DIAGNOSIS — Z12.11 COLON CANCER SCREENING: ICD-10-CM

## 2021-11-08 DIAGNOSIS — G47.00 PERSISTENT INSOMNIA: ICD-10-CM

## 2021-11-08 DIAGNOSIS — F41.9 CHRONIC ANXIETY: ICD-10-CM

## 2021-11-08 DIAGNOSIS — D64.9 ANEMIA, UNSPECIFIED TYPE: ICD-10-CM

## 2021-11-08 DIAGNOSIS — I10 BENIGN ESSENTIAL HYPERTENSION: ICD-10-CM

## 2021-11-08 DIAGNOSIS — R19.5 POSITIVE FIT (FECAL IMMUNOCHEMICAL TEST): ICD-10-CM

## 2021-11-08 LAB
CHOLEST SERPL-MCNC: 148 MG/DL
ERYTHROCYTE [DISTWIDTH] IN BLOOD BY AUTOMATED COUNT: 16.7 % (ref 10–15)
FASTING STATUS PATIENT QL REPORTED: YES
HCT VFR BLD AUTO: 38.8 % (ref 40–53)
HDLC SERPL-MCNC: 32 MG/DL
HGB BLD-MCNC: 12.7 G/DL (ref 13.3–17.7)
LDLC SERPL CALC-MCNC: 101 MG/DL
MCH RBC QN AUTO: 27.2 PG (ref 26.5–33)
MCHC RBC AUTO-ENTMCNC: 32.7 G/DL (ref 31.5–36.5)
MCV RBC AUTO: 83 FL (ref 78–100)
NONHDLC SERPL-MCNC: 116 MG/DL
PLATELET # BLD AUTO: 192 10E3/UL (ref 150–450)
PSA SERPL-MCNC: 0.83 UG/L (ref 0–4)
RBC # BLD AUTO: 4.67 10E6/UL (ref 4.4–5.9)
TRIGL SERPL-MCNC: 74 MG/DL
WBC # BLD AUTO: 6.5 10E3/UL (ref 4–11)

## 2021-11-08 PROCEDURE — 99214 OFFICE O/P EST MOD 30 MIN: CPT | Performed by: FAMILY MEDICINE

## 2021-11-08 PROCEDURE — 36415 COLL VENOUS BLD VENIPUNCTURE: CPT | Performed by: FAMILY MEDICINE

## 2021-11-08 PROCEDURE — 80061 LIPID PANEL: CPT | Performed by: FAMILY MEDICINE

## 2021-11-08 PROCEDURE — 85027 COMPLETE CBC AUTOMATED: CPT | Performed by: FAMILY MEDICINE

## 2021-11-08 PROCEDURE — G0103 PSA SCREENING: HCPCS | Performed by: FAMILY MEDICINE

## 2021-11-08 RX ORDER — CARVEDILOL 25 MG/1
25 TABLET ORAL 2 TIMES DAILY WITH MEALS
Qty: 180 TABLET | Refills: 3 | Status: SHIPPED | OUTPATIENT
Start: 2021-11-08 | End: 2022-11-04

## 2021-11-08 RX ORDER — TRIAMCINOLONE ACETONIDE 1 MG/G
CREAM TOPICAL
Qty: 45 G | Refills: 0 | Status: SHIPPED | OUTPATIENT
Start: 2021-11-08 | End: 2022-12-19

## 2021-11-08 RX ORDER — ATORVASTATIN CALCIUM 10 MG/1
10 TABLET, FILM COATED ORAL DAILY
Qty: 90 TABLET | Refills: 1 | Status: SHIPPED | OUTPATIENT
Start: 2021-11-08 | End: 2022-04-20

## 2021-11-08 RX ORDER — QUETIAPINE FUMARATE 100 MG/1
100 TABLET, FILM COATED ORAL AT BEDTIME
Qty: 90 TABLET | Refills: 1 | Status: SHIPPED | OUTPATIENT
Start: 2021-11-08 | End: 2022-01-24

## 2021-11-08 RX ORDER — LISINOPRIL 20 MG/1
20 TABLET ORAL DAILY
Qty: 90 TABLET | Refills: 3 | Status: SHIPPED | OUTPATIENT
Start: 2021-11-08 | End: 2022-01-24

## 2021-11-08 ASSESSMENT — ENCOUNTER SYMPTOMS
SHORTNESS OF BREATH: 0
COUGH: 0
FATIGUE: 0
BACK PAIN: 0
PALPITATIONS: 0
LIGHT-HEADEDNESS: 0
FEVER: 0

## 2021-11-08 ASSESSMENT — MIFFLIN-ST. JEOR: SCORE: 1635.46

## 2021-11-08 NOTE — PROGRESS NOTES
Assessment & Plan     Dermatitis  - Rash on the scalp suggestive of skin dermatitis .  - discussed treatment option   - triamcinolone (KENALOG) 0.1 % external cream; Every other day on affected area for 2 weeks .    - recommend to contact if rash fails to improve .    Screening for hyperlipidemia  - will check lipid panel     Benign essential hypertension  - Blood pressure at goal   - lisinopril (ZESTRIL) 20 MG tablet; Take 1 tablet (20 mg) by mouth daily    Persistent insomnia  - QUEtiapine (SEROQUEL) 100 MG tablet; Take 1 tablet (100 mg) by mouth At Bedtime    Chronic anxiety  - QUEtiapine (SEROQUEL) 100 MG tablet; Take 1 tablet (100 mg) by mouth At Bedtime    Chronic systolic heart failure (H)  - currently no active symptoms   Optimized on medication .  - Echocardiogram Complete; Future    - HEART FAILURE ACTION PLAN    Anemia, unspecified type  - will recheck count mild normocytic anemia   - CBC with Platelets      Hyperlipidemia LDL goal <100  - Lipid panel reflex to direct LDL Fasting  - atorvastatin (LIPITOR) 10 MG tablet; Take 1 tablet (10 mg) by mouth daily    Colon cancer screening  - Fecal colorectal cancer screen (FIT); Future  - Fecal colorectal cancer screen (FIT)    Screening for prostate cancer  - PSA, screen        Return in about 6 months (around 5/8/2022) for Follow up.    Louisa Hurley MD  [facility] is a 61 year [facility] who presents for the following health issues     History of Present Illness       He eats 2-3 servings of fruits and vegetables daily.He consumes 3 sweetened beverage(s) daily.He exercises with enough effort to increase his heart rate 20 to 29 minutes per day.  He exercises with enough effort to increase his heart rate 5 days per week.   He is taking medications regularly.       Rash  Onset/Duration: 2 weeks  Description  Location: top of the head  Character: dark color   Itching: moderate  Intensity:  mild  Progression of Symptoms:   "same  Accompanying signs and symptoms:   Fever: no  Body aches or joint pain: no  Sore throat symptoms: no  Recent cold symptoms: no  History:           Previous episodes of similar rash: None  New exposures:  None  Recent travel: no  Exposure to similar rash: no  Precipitating or alleviating factors: none  Therapies tried and outcome: otc ointment not helping        Review of Systems   Constitutional: Negative for fatigue and fever.   Respiratory: Negative for cough and shortness of breath.    Cardiovascular: Negative for chest pain, palpitations and peripheral edema.   Musculoskeletal: Negative for back pain.   Neurological: Negative for light-headedness.   Psychiatric/Behavioral: Negative for behavioral problems.            Objective    /75 (BP Location: Right arm, Patient Position: Sitting, Cuff Size: Adult Large)   Pulse 64   Temp 98.1  F (36.7  C) (Oral)   Resp 14   Ht 1.702 m (5' 7\")   Wt 86.2 kg (190 lb)   BMI 29.76 kg/m    Body mass index is 29.76 kg/m .  Physical Exam  Vitals and nursing note reviewed.   Constitutional:       Appearance: Normal appearance.   HENT:      Head: Normocephalic.      Nose: No congestion.      Mouth/Throat:      Mouth: Mucous membranes are moist.   Cardiovascular:      Rate and Rhythm: Normal rate.      Pulses: Normal pulses.   Pulmonary:      Effort: Pulmonary effort is normal.   Musculoskeletal:         General: Normal range of motion.   Skin:     General: Skin is warm and dry.      Findings: Rash present.      Comments: Eczematous skin rash on the scalp .   Neurological:      General: No focal deficit present.      Mental Status: He is alert and oriented to person, place, and time.            "

## 2021-11-08 NOTE — TELEPHONE ENCOUNTER
I spoke to Alfa when he was in clinic today and he still has not scheduled echo. He asked me to assist him with this. He says he is available M-W during the day. I called scheduling and needed to leave a message. I will await return from cardiac scheduling.     Adry Monae R.N.

## 2021-11-09 NOTE — TELEPHONE ENCOUNTER
I spoke to cardiology scheduling and appointment secured for Alfa on 11/17/21.     Called and spoke with Alfa, I gave him details for his echo appointment.     Adry Monae R.N.

## 2021-12-21 ENCOUNTER — MYC MEDICAL ADVICE (OUTPATIENT)
Dept: FAMILY MEDICINE | Facility: CLINIC | Age: 61
End: 2021-12-21
Payer: COMMERCIAL

## 2021-12-22 NOTE — TELEPHONE ENCOUNTER
Call to Alfa, scheduled for early January as too soon at this time for booster. Alfa understanding of booster timeline. No further questions or concerns. Adry Monae R.N.

## 2021-12-27 PROCEDURE — 82274 ASSAY TEST FOR BLOOD FECAL: CPT | Performed by: FAMILY MEDICINE

## 2021-12-29 LAB — HEMOCCULT STL QL IA: POSITIVE

## 2021-12-30 ENCOUNTER — TELEPHONE (OUTPATIENT)
Dept: FAMILY MEDICINE | Facility: CLINIC | Age: 61
End: 2021-12-30
Payer: COMMERCIAL

## 2021-12-30 NOTE — TELEPHONE ENCOUNTER
Attempted to reach patient, LVMTCB. May route to triage nurse to relay result note below.     Urbano KUO RN

## 2021-12-30 NOTE — TELEPHONE ENCOUNTER
----- Message from Madalyn Field PA-C sent at 12/30/2021 10:57 AM CST -----  Please call pt and inform of the following:    - FIT test (colon cancer screening test) noted blood in your stool sample.  This is most likely due to innocent reason, (most commonly hemorrhoids) but since we can't rule out the possibility that there may be growth in your colon causing the blood, I'd like you to get a colonoscopy.  I have placed a referral, they will call you to schedule.     Will also send hard copy in the mail.

## 2021-12-31 ENCOUNTER — HOSPITAL ENCOUNTER (OUTPATIENT)
Facility: CLINIC | Age: 61
End: 2021-12-31
Attending: INTERNAL MEDICINE | Admitting: INTERNAL MEDICINE
Payer: COMMERCIAL

## 2021-12-31 NOTE — TELEPHONE ENCOUNTER
Attempted to reach patient, informed patient is sleeping, RN will try back around 11AM today.     Urbano KUO RN

## 2022-01-03 DIAGNOSIS — Z11.59 ENCOUNTER FOR SCREENING FOR OTHER VIRAL DISEASES: ICD-10-CM

## 2022-01-03 NOTE — TELEPHONE ENCOUNTER
Called and spoke with patient, relayed provider result note below. Gave number to call and schedule for colonoscopy. No further questions or concerns at this time.     Urbano KUO RN

## 2022-01-11 ENCOUNTER — IMMUNIZATION (OUTPATIENT)
Dept: FAMILY MEDICINE | Facility: CLINIC | Age: 62
End: 2022-01-11
Payer: COMMERCIAL

## 2022-01-11 PROCEDURE — 0064A COVID-19,PF,MODERNA (18+ YRS BOOSTER .25ML): CPT

## 2022-01-11 PROCEDURE — 91306 COVID-19,PF,MODERNA (18+ YRS BOOSTER .25ML): CPT

## 2022-01-19 NOTE — PROGRESS NOTES
Pre-Visit Planning   Next 5 appointments (look out 90 days)    Jan 24, 2022  5:00 PM  (Arrive by 4:40 PM)  Provider Visit with Louisa Hurley MD  [facility] ([facility] ) [address] [address] [facility] [address] [phone]        Appointment Notes for this encounter:   follow up on blood in stool sample    Questionnaires Reviewed/Assigned  Additional questionnaires assigned: PHQ 2    Patient preferred phone number: [phone]    [employer] message sent to facilitate completion of PVP

## 2022-01-22 ENCOUNTER — LAB (OUTPATIENT)
Dept: FAMILY MEDICINE | Facility: CLINIC | Age: 62
End: 2022-01-22
Payer: COMMERCIAL

## 2022-01-22 DIAGNOSIS — Z20.822 CLOSE EXPOSURE TO 2019 NOVEL CORONAVIRUS: ICD-10-CM

## 2022-01-22 PROCEDURE — U0003 INFECTIOUS AGENT DETECTION BY NUCLEIC ACID (DNA OR RNA); SEVERE ACUTE RESPIRATORY SYNDROME CORONAVIRUS 2 (SARS-COV-2) (CORONAVIRUS DISEASE [COVID-19]), AMPLIFIED PROBE TECHNIQUE, MAKING USE OF HIGH THROUGHPUT TECHNOLOGIES AS DESCRIBED BY CMS-2020-01-R: HCPCS

## 2022-01-22 PROCEDURE — U0005 INFEC AGEN DETEC AMPLI PROBE: HCPCS

## 2022-01-23 LAB — SARS-COV-2 RNA RESP QL NAA+PROBE: NEGATIVE

## 2022-01-24 ENCOUNTER — OFFICE VISIT (OUTPATIENT)
Dept: FAMILY MEDICINE | Facility: CLINIC | Age: 62
End: 2022-01-24
Payer: COMMERCIAL

## 2022-01-24 VITALS
TEMPERATURE: 98.7 F | HEART RATE: 67 BPM | HEIGHT: 67 IN | DIASTOLIC BLOOD PRESSURE: 84 MMHG | SYSTOLIC BLOOD PRESSURE: 130 MMHG | RESPIRATION RATE: 14 BRPM | BODY MASS INDEX: 28.41 KG/M2 | WEIGHT: 181 LBS

## 2022-01-24 DIAGNOSIS — G47.00 PERSISTENT INSOMNIA: ICD-10-CM

## 2022-01-24 DIAGNOSIS — I50.42 CHRONIC COMBINED SYSTOLIC AND DIASTOLIC CONGESTIVE HEART FAILURE (H): Primary | ICD-10-CM

## 2022-01-24 DIAGNOSIS — F41.9 CHRONIC ANXIETY: ICD-10-CM

## 2022-01-24 DIAGNOSIS — I10 BENIGN ESSENTIAL HYPERTENSION: ICD-10-CM

## 2022-01-24 PROCEDURE — 99214 OFFICE O/P EST MOD 30 MIN: CPT | Performed by: FAMILY MEDICINE

## 2022-01-24 RX ORDER — LISINOPRIL 20 MG/1
20 TABLET ORAL DAILY
Qty: 90 TABLET | Refills: 4 | Status: SHIPPED | OUTPATIENT
Start: 2022-01-24 | End: 2022-05-31

## 2022-01-24 RX ORDER — QUETIAPINE FUMARATE 100 MG/1
100 TABLET, FILM COATED ORAL AT BEDTIME
Qty: 90 TABLET | Refills: 1 | Status: SHIPPED | OUTPATIENT
Start: 2022-01-24 | End: 2022-11-08

## 2022-01-24 ASSESSMENT — MIFFLIN-ST. JEOR: SCORE: 1594.64

## 2022-01-24 NOTE — PROGRESS NOTES
"  Assessment & Plan     Benign essential hypertension  Denies any medication side effect   Recommend to continue medication     - lisinopril (ZESTRIL) 20 MG tablet; Take 1 tablet (20 mg) by mouth daily      Persistent insomnia    - QUEtiapine (SEROQUEL) 100 MG tablet; Take 1 tablet (100 mg) by mouth At Bedtime    Chronic anxiety    - QUEtiapine (SEROQUEL) 100 MG tablet; Take 1 tablet (100 mg) by mouth At Bedtime    Chronic combined systolic and diastolic congestive heart failure (H)  Feeling much better   Denies any new symptoms   Will continue to monitor   Has not completed echo will wait for results .    Awaiting colonoscopy for positive FIT test .        Return in about 3 months (around 4/24/2022) for Routine preventive.    Louisa Hurley MD  [facility] ALESSANDRO merritt is a 61 year old who presents for the following health issues     History of Present Illness       He eats 2-3 servings of fruits and vegetables daily.He consumes 2 sweetened beverage(s) daily.He exercises with enough effort to increase his heart rate 10 to 19 minutes per day.    He is taking medications regularly.       Follow up blood in stool   Denies any new concern or complaint .    Review of Systems   Gastrointestinal: Negative for abdominal distention and abdominal pain.   Genitourinary: Negative for difficulty urinating and dysuria.   Musculoskeletal: Negative for arthralgias and back pain.   Neurological: Negative for facial asymmetry.   Psychiatric/Behavioral: Negative for agitation and behavioral problems.            Objective    /84 (BP Location: Right arm, Patient Position: Sitting, Cuff Size: Adult Large)   Pulse 67   Temp 98.7  F (37.1  C) (Oral)   Resp 14   Ht 1.702 m (5' 7\")   Wt 82.1 kg (181 lb)   BMI 28.35 kg/m    Body mass index is 28.35 kg/m .  Physical Exam  Vitals reviewed.   Constitutional:       Appearance: Normal appearance.   HENT:      Head: Normocephalic.   Cardiovascular:      " Pulses: Normal pulses.   Pulmonary:      Effort: Pulmonary effort is normal.      Breath sounds: Normal breath sounds.   Abdominal:      General: Abdomen is flat.      Palpations: Abdomen is soft.   Skin:     General: Skin is warm.   Neurological:      General: No focal deficit present.      Mental Status: He is alert.   Psychiatric:         Mood and Affect: Mood normal.

## 2022-01-25 ASSESSMENT — ENCOUNTER SYMPTOMS
DIFFICULTY URINATING: 0
AGITATION: 0
ARTHRALGIAS: 0
BACK PAIN: 0
ABDOMINAL PAIN: 0
ABDOMINAL DISTENTION: 0
DYSURIA: 0
FACIAL ASYMMETRY: 0

## 2022-02-01 ENCOUNTER — HOSPITAL ENCOUNTER (OUTPATIENT)
Facility: CLINIC | Age: 62
Discharge: HOME | End: 2022-02-01
Attending: INTERNAL MEDICINE | Admitting: INTERNAL MEDICINE
Payer: COMMERCIAL

## 2022-02-01 VITALS
OXYGEN SATURATION: 100 % | TEMPERATURE: 97.4 F | HEART RATE: 62 BPM | DIASTOLIC BLOOD PRESSURE: 89 MMHG | RESPIRATION RATE: 14 BRPM | SYSTOLIC BLOOD PRESSURE: 128 MMHG

## 2022-02-01 LAB — COLONOSCOPY: NORMAL

## 2022-02-01 PROCEDURE — G0500 MOD SEDAT ENDO SERVICE >5YRS: HCPCS | Performed by: INTERNAL MEDICINE

## 2022-02-01 PROCEDURE — 88305 TISSUE EXAM BY PATHOLOGIST: CPT | Mod: TC | Performed by: INTERNAL MEDICINE

## 2022-02-01 PROCEDURE — 250N000011 HC RX IP 250 OP 636: Performed by: INTERNAL MEDICINE

## 2022-02-01 PROCEDURE — 45385 COLONOSCOPY W/LESION REMOVAL: CPT | Performed by: INTERNAL MEDICINE

## 2022-02-01 PROCEDURE — 45380 COLONOSCOPY AND BIOPSY: CPT | Mod: XU | Performed by: INTERNAL MEDICINE

## 2022-02-01 RX ORDER — FLUMAZENIL 0.1 MG/ML
0.2 INJECTION, SOLUTION INTRAVENOUS
Status: DISCONTINUED | OUTPATIENT
Start: 2022-02-01 | End: 2022-02-01 | Stop reason: HOSPADM

## 2022-02-01 RX ORDER — ONDANSETRON 2 MG/ML
4 INJECTION INTRAMUSCULAR; INTRAVENOUS
Status: DISCONTINUED | OUTPATIENT
Start: 2022-02-01 | End: 2022-02-01 | Stop reason: HOSPADM

## 2022-02-01 RX ORDER — FENTANYL CITRATE 0.05 MG/ML
25-50 INJECTION, SOLUTION INTRAMUSCULAR; INTRAVENOUS
Status: DISCONTINUED | OUTPATIENT
Start: 2022-02-01 | End: 2022-02-01 | Stop reason: HOSPADM

## 2022-02-01 RX ORDER — ONDANSETRON 4 MG/1
4 TABLET, ORALLY DISINTEGRATING ORAL EVERY 6 HOURS PRN
Status: DISCONTINUED | OUTPATIENT
Start: 2022-02-01 | End: 2022-02-01 | Stop reason: HOSPADM

## 2022-02-01 RX ORDER — NALOXONE HYDROCHLORIDE 0.4 MG/ML
0.2 INJECTION, SOLUTION INTRAMUSCULAR; INTRAVENOUS; SUBCUTANEOUS
Status: DISCONTINUED | OUTPATIENT
Start: 2022-02-01 | End: 2022-02-01 | Stop reason: HOSPADM

## 2022-02-01 RX ORDER — ATROPINE SULFATE 0.4 MG/ML
0.4 AMPUL (ML) INJECTION
Status: DISCONTINUED | OUTPATIENT
Start: 2022-02-01 | End: 2022-02-01 | Stop reason: HOSPADM

## 2022-02-01 RX ORDER — EPINEPHRINE 1 MG/ML
0.1 INJECTION, SOLUTION INTRAMUSCULAR; SUBCUTANEOUS
Status: DISCONTINUED | OUTPATIENT
Start: 2022-02-01 | End: 2022-02-01 | Stop reason: HOSPADM

## 2022-02-01 RX ORDER — NALOXONE HYDROCHLORIDE 0.4 MG/ML
0.4 INJECTION, SOLUTION INTRAMUSCULAR; INTRAVENOUS; SUBCUTANEOUS
Status: DISCONTINUED | OUTPATIENT
Start: 2022-02-01 | End: 2022-02-01 | Stop reason: HOSPADM

## 2022-02-01 RX ORDER — ONDANSETRON 2 MG/ML
4 INJECTION INTRAMUSCULAR; INTRAVENOUS EVERY 6 HOURS PRN
Status: DISCONTINUED | OUTPATIENT
Start: 2022-02-01 | End: 2022-02-01 | Stop reason: HOSPADM

## 2022-02-01 RX ORDER — SIMETHICONE 40MG/0.6ML
133 SUSPENSION, DROPS(FINAL DOSAGE FORM)(ML) ORAL
Status: DISCONTINUED | OUTPATIENT
Start: 2022-02-01 | End: 2022-02-01 | Stop reason: HOSPADM

## 2022-02-01 RX ORDER — LIDOCAINE 40 MG/G
CREAM TOPICAL
Status: DISCONTINUED | OUTPATIENT
Start: 2022-02-01 | End: 2022-02-01 | Stop reason: HOSPADM

## 2022-02-01 RX ORDER — PROCHLORPERAZINE MALEATE 10 MG
10 TABLET ORAL EVERY 6 HOURS PRN
Status: DISCONTINUED | OUTPATIENT
Start: 2022-02-01 | End: 2022-02-01 | Stop reason: HOSPADM

## 2022-02-01 RX ORDER — FENTANYL CITRATE 0.05 MG/ML
50-100 INJECTION, SOLUTION INTRAMUSCULAR; INTRAVENOUS
Status: COMPLETED | OUTPATIENT
Start: 2022-02-01 | End: 2022-02-01

## 2022-02-01 RX ADMIN — MIDAZOLAM 2 MG: 1 INJECTION INTRAMUSCULAR; INTRAVENOUS at 12:56

## 2022-02-01 RX ADMIN — FENTANYL CITRATE 100 MCG: 0.05 INJECTION, SOLUTION INTRAMUSCULAR; INTRAVENOUS at 12:57

## 2022-02-01 NOTE — LETTER
January 25, 2022      Alfa Cm  [address]   [place] [address]        Dear Alfa,       Please be aware that coverage of these services is subject to the terms and limitations of your health insurance plan.  Call member services at your health plan with any benefit or coverage questions.    Thank you for choosing [facility]. You are scheduled for the following service(s):    Date:   Tuesday February 1, 2022            Procedure:  COLONOSCOPY  Doctor:        Mike Reid MD   Arrival Time:  11:45am *Enter and check in at the Main Hospital Entrance*  Procedure Time:  12:30pm      Location:   [facility] [place]        Endoscopy Department, First Floor         [address] [place], [place] [address]      [phone] or [phone] (Formerly [facility]) to reschedule        MIRALAX -GATORADE  PREP  Colonoscopy is the most accurate test to detect colon polyps and colon cancer; and the only test where polyps can be removed. During this procedure, a doctor examines the lining of your large intestine and rectum through a flexible tube.   Transportation  You must arrange for a ride for the day of your procedure with a responsible adult. A taxi , Uber, etc, is not an option unless you are accompanied by a responsible adult. If you fail to arrange transportation with a responsible adult, your procedure will be cancelled and rescheduled.    Purchase the  following supplies at your local pharmacy:  - 2 (two) bisacodyl tablets: each tablet contains 5 mg.  (Dulcolax  laxative NOT Dulcolax  stool softener)   - 1 (one) 8.3 oz bottle of Polyethylene Glycol (PEG) 3350 Powder   (MiraLAX , Smooth LAX , ClearLAX  or equivalent)  - 64 oz Gatorade    Regular Gatorade, Gatorade G2 , Powerade , Powerade Zero  or Pedialyte  is acceptable. Red colored flavors are not allowed; all other colors (yellow, green, orange, purple and blue) are okay. It is also okay to buy two 2.12 oz  packets of powdered Gatorade that can be mixed with water to a total volume of 64 oz of liquid.  - 1 (one) 10 oz bottle of Magnesium Citrate (Red colored flavors are not allowed)  It is also okay for you to use a 0.5 oz package of powdered magnesium citrate (17 g) mixed with 10 oz of water.      PREPARATION FOR COLONOSCOPY    7 days before:    Discontinue fiber supplements and medications containing iron. This includes Metamucil  and Fibercon ; and multivitamins with iron.    3 days before:    Begin a low-fiber diet. A low-fiber diet helps making the cleanout more effective.     Examples of a low-fiber diet include (but are not limited to): white bread, white rice, pasta, crackers, fish, chicken, eggs, ground beef, creamy peanut butter, cooked/steamed/boiled vegetables, canned fruit, bananas, melons, milk, plain yogurt cheese, salad dressing and other condiments.     The following are not allowed on a low-fiber diet: seeds, nuts, popcorn, bran, whole wheat, corn, quinoa, raw fruits and vegetables, berries and dried fruit, beans and lentils.    For additional details on low-fiber diet, please refer to the table on the last page.    2 days before:    Continue the low-fiber diet.     Drink at least 8 glasses of water throughout the day.     Stop eating solid foods at 11:45 pm.    1 day before:    In the morning: begin a clear liquid diet (liquids you can see through).     Examples of a clear liquid diet include: water, clear broth or bouillon, Gatorade, Pedialyte or Powerade, carbonated and non-carbonated soft drinks (Sprite , 7-Up , ginger ale), strained fruit juices without pulp (apple, white grape, white cranberry), Jell-O  and popsicles.     The following are not allowed on a clear liquid diet: red liquids, alcoholic beverages, dairy products (milk, creamer, and yogurt), protein shakes, creamy broths, juice with pulp and chewing tobacco.    At noon: take 2 (two) bisacodyl tablets     At 4 (and no later than 6pm):  start drinking the Miralax-Gatorade preparation (8.3 oz of Miralax mixed with 64 oz of Gatorade in a large pitcher). Drink 1(one) 8 oz glass every 15 minutes thereafter, until the mixture is gone.    COLON CLEANSING TIPS: drink adequate amounts of fluids before and after your colon cleansing to prevent dehydration. Stay near a toilet because you will have diarrhea. Even if you are sitting on the toilet, continue to drink the cleansing solution every 15 minutes. If you feel nauseous or vomit, rinse your mouth with water, take a 15 to 30-minute-break and then continue drinking the solution. You will be uncomfortable until the stool has flushed from your colon (in about 2 to 4 hours). You may feel chilled.    Day of your procedure  You may take all of your morning medications including blood pressure medications, blood thinners (if you have not been instructed to stop these by our office), methadone, anti-seizure medications with sips of water 3 hours prior to your procedure or earlier. Do not take insulin or vitamins prior to your procedure. Continue the clear liquid diet.       4 hours prior: drink 10 oz of magnesium citrate. It may be easier to drink it with a straw.    STOP consuming all liquids after that.     Do not take anything by mouth during this time.     Allow extra time to travel to your procedure as you may need to stop and use a restroom along the way.    You are ready for the procedure, if you followed all instructions and your stool is no longer formed, but clear or yellow liquid. If you are unsure whether your colon is clean, please call our office at [phone] before you leave for your appointment.    Bring the following to your procedure:  - Insurance Card/Photo ID.   - List of current medications including over-the-counter medications and supplements.   - Your rescue inhaler if you currently use one to control asthma.    Canceling or rescheduling your appointment:   If you must cancel or  reschedule your appointment, please call [phone] as soon as possible.      COLONOSCOPY PRE-PROCEDURE CHECKLIST    If you have diabetes, ask your regular doctor for diet and medication restrictions.  If you take an anticoagulant or anti-platelet medication (such as Coumadin , Lovenox , Pradaxa , Xarelto , Eliquis , etc.), please call your primary doctor for advice on holding this medication.  If you take aspirin you may continue to do so.  If you are or may be pregnant, please discuss the risks and benefits of this procedure with your doctor.        What happens during a colonoscopy?    Plan to spend up to two hours, starting at registration time, at the endoscopy center the day of your procedure. The colonoscopy takes an average of 15 to 30 minutes. Recovery time is about 30 minutes.      Before the exam:    You will change into a gown.    Your medical history and medication list will be reviewed with you, unless that has been done over the phone prior to the procedure.     A nurse will insert an intravenous (IV) line into your hand or arm.    The doctor will meet with you and will give you a consent form to sign.  During the exam:     Medicine will be given through the IV line to help you relax.     Your heart rate and oxygen levels will be monitored. If your blood pressure is low, you may be given fluids through the IV line.     The doctor will insert a flexible hollow tube, called a colonoscope, into your rectum. The scope will be advanced slowly through the large intestine (colon).    You may have a feeling of fullness or pressure.     If an abnormal tissue or a polyp is found, the doctor may remove it through the endoscope for closer examination, or biopsy. Tissue removal is painless    After the exam:           Any tissue samples removed during the exam will be sent to a lab for evaluation. It may take 5-7 working days for you to be notified of the results.     A nurse will provide you with complete  discharge instructions before you leave the endoscopy center. Be sure to ask the nurse for specific instructions if you take blood thinners such as Aspirin, Coumadin or Plavix.     The doctor will prepare a full report for you and for the physician who referred you for the procedure.     Your doctor will talk with you about the initial results of your exam.      Medication given during the exam will prohibit you from driving for the rest of the day.     Following the exam, you may resume your normal diet. Your first meal should be light, no greasy foods. Avoid alcohol until the next day.     You may resume your regular activities the day after the procedure.         LOW-FIBER DIET    Foods RECOMMENDED Foods to AVOID   Breads, Cereal, Rice and Pasta:   White bread, rolls, biscuits, croissant and doyle toast.   Waffles, Niuean toast and pancakes.   White rice, noodles, pasta, macaroni and peeled cooked potatoes.   Plain crackers and saltines.   Cooked cereals: farina, cream of rice.   Cold cereals: Puffed Rice , Rice Krispies , Corn Flakes  and Special K    Breads, Cereal, Rice and Pasta:   Breads or rolls with nuts, seeds or fruit.   Whole wheat, pumpernickel, rye breads and cornbread.   Potatoes with skin, brown or wild rice, and kasha (buckwheat).     Vegetables:   Tender cooked and canned vegetables without seeds: carrots, asparagus tips, green or wax beans, pumpkin, spinach, lima beans. Vegetables:   Raw or steamed vegetables.   Vegetables with seeds.   Sauerkraut.   Winter squash, peas, broccoli, Brussel sprouts, cabbage, onions, cauliflower, baked beans, peas and corn.   Fruits:   Strained fruit juice.   Canned fruit, except pineapple.   Ripe bananas and melon. Fruits:   Prunes and prune juice.   Raw fruits.   Dried fruits: figs, dates and raisins.   Milk/Dairy:   Milk: plain or flavored.   Yogurt, custard and ice cream.   Cheese and cottage cheese Milk/Dairy:     Meat and other proteins:   ground, well-cooked  tender beef, lamb, ham, veal, pork, fish, poultry and organ meats.   Eggs.   Peanut butter without nuts. Meat and other proteins:   Tough, fibrous meats with gristle.   Dry beans, peas and lentils.   Peanut butter with nuts.   Tofu.   Fats, Snack, Sweets, Condiments and Beverages:   Margarine, butter, oils, mayonnaise, sour cream and salad dressing, plain gravy.   Sugar, hard candy, clear jelly, honey and syrup.   Spices, cooked herbs, bouillon, broth and soups made with allowed vegetable, ketchup and mustard.   Coffee, tea and carbonated drinks.   Plain cakes, cookies and pretzels.   Gelatin, plain puddings, custard, ice cream, sherbet and popsicles. Fats, Snack, Sweets, Condiments and Beverages:   Nuts, seeds and coconut.   Jam, marmalade and preserves.   Pickles, olives, relish and horseradish.   All desserts containing nuts, seeds, dried fruit and coconut; or made from whole grains or bran.   Candy made with nuts or seeds.   Popcorn.         DIRECTIONS TO THE ENDOSCOPY DEPARTMENT    From the north ([facility])  Take [address] South, exit on [address] [address]. Get into the left hand marcos, turn left (east), go one-half mile to [address] and turn left. Go north to the second stoplight, take a right on [address] and follow it to the Main Hospital entrance.    From the south ([facility])  Take [address]N to the [address] split and exit on [address] [address]. On [address] [address], turn left (west) to [address]. Turn right (north) on [address]. Go north to the second stoplight, take a right on [address] and follow it to the Main Hospital entrance.    From the east via 35E ([facility])  Take [address] south to [address] [address] exit. Turn right on [address] [address]. Go west to [address]. Turn right (north) on [address]. Go to the second stoplight, take a right on [address] to the Main Hospital entrance.    From the east via [address] ([facility])  [address]. Turn left (south) on [address] to [address], turn left (east) on [address] and follow it to the Main Hospital entrance.    From the west via [address] ([place])  Take [address] east to [address]. Turn right (south) on [address] to [address], turn left (east) on [address] and follow it to the Main Hospital entrance.

## 2022-02-01 NOTE — DISCHARGE INSTRUCTIONS
The patient has received a copy of the Provation  report the doctor has written and discharge instructions have been discussed with the patient and responsible adult.  All questions were addressed and answered prior to patient discharge.      Understanding Colon and Rectal Polyps     The colon has a smooth lining composed of millions of cells.     The colon (also called the large intestine) is a muscular tube that forms the last part of the digestive tract. It absorbs water and stores food waste. The colon is about 4 to 6 feet long. The rectum is the last 6 inches of the colon. The colon and rectum have a smooth lining composed of millions of cells. Changes in these cells can lead to growths in the colon that can become cancerous and should be removed.     When the Colon Lining Changes  Changes that occur in the cells that line the colon or rectum can lead to growths called polyps. Over a period of years, polyps can turn cancerous. Removing polyps early may prevent cancer from ever forming.      Polyps  Polyps are fleshy clumps of tissue that form on the lining of the colon or rectum. Small polyps are usually benign (not cancerous). However, over time, cells in a polyp can change and become cancerous. The larger a polyp grows, the more likely this is to happen. Also, certain types of polyps known as adenomatous polyps are considered premalignant. This means that they will almost always become cancerous if they re not removed.          Cancer  Almost all colorectal cancers start when polyp cells begin growing abnormally. As a cancerous tumor grows, it may involve more and more of the colon or rectum. In time, cancer can also grow beyond the colon or rectum and spread to nearby organs or to glands called lymph nodes. The cells can also travel to other parts of the body. This is known as metastasis. The earlier a cancerous tumor is removed, the better the chance of preventing its spread.        [id number] [employer]  [employer], [address] [place] [address], [place] [address]. All rights reserved. This information is not intended as a substitute for professional medical care. Always follow your healthcare professional's instructions.

## 2022-02-01 NOTE — H&P
Pre-Endoscopy History and Physical     Alfa Cm MRN# [id number]   YOB: 1960 Age: 61 year old     Date of Procedure: 2/1/2022  Primary care provider: Louisa Hurley  Type of Endoscopy: Colonoscopy with possible biopsy, possible polypectomy  Reason for Procedure: positive fit  Type of Anesthesia Anticipated: Conscious Sedation    HPI:    Alfa is a 61 year old male who will be undergoing the above procedure.      A history and physical has been performed. The patient's medications and allergies have been reviewed. The risks and benefits of the procedure and the sedation options and risks were discussed with the patient.  All questions were answered and informed consent was obtained.      He denies a personal or family history of anesthesia complications or bleeding disorders.     Patient Active Problem List   Diagnosis     ST elevation myocardial infarction involving right coronary artery (H)     Hyperlipidemia     Benign essential hypertension     Coronary artery disease involving native coronary artery     Tobacco dependence syndrome     STEMI (ST elevation myocardial infarction) (H)     Sepsis (H)     Coronary artery disease involving native coronary artery without angina pectoris     Iron deficiency anemia, unspecified iron deficiency anemia type     Anemia, unspecified type     Chronic combined systolic and diastolic congestive heart failure (H)     Alcohol dependence with alcohol-induced sleep disorder (H)     Persistent insomnia        Past Medical History:   Diagnosis Date     Alcohol abuse      Coronary artery disease involving native coronary artery without angina pectoris 2011    angiplasty, stent [facility] at [facility]     Hyperlipidemia      Hypertension      Myocardial infarction (H)      Substance abuse (H)      Tobacco abuse         Past Surgical History:   Procedure Laterality Date     [place] PATIENT HAS A CORONARY ARTERY STENT         Social History     Tobacco Use      Smoking status: Current Every Day Smoker     Packs/day: 0.50     Types: Cigarettes     Smokeless tobacco: Never Used     Tobacco comment: quit date 10/16/17   Substance Use Topics     Alcohol use: Not Currently     Alcohol/week: 20.0 standard drinks     Types: 20 Cans of beer per week       Family History   Problem Relation Age of Onset     Diabetes Mother        Prior to Admission medications    Medication Sig Start Date End Date Taking? Authorizing Provider   albuterol (PROAIR HFA/PROVENTIL HFA/VENTOLIN HFA) 108 (90 BASE) MCG/ACT Inhaler Inhale 1-2 puffs into the lungs every 6 hours as needed for shortness of breath / dyspnea or wheezing 1/11/18   Tulio Arango MD   alcohol swab prep pads Use to swab area of injection/kylee as directed. 10/18/17   Jorge L Munoz DO   Aspirin 81 MG CAPS Take 81 mg by mouth daily 11/8/21   Louisa Hurley MD   atorvastatin (LIPITOR) 10 MG tablet Take 1 tablet (10 mg) by mouth daily 11/8/21   Louisa Hurley MD   carvedilol (COREG) 25 MG tablet Take 1 tablet (25 mg) by mouth 2 times daily (with meals) 11/8/21   Louisa Hurley MD   fexofenadine (ALLEGRA) 180 MG tablet Take 1 tablet (180 mg) by mouth daily 3/24/21   Louis Browning DO   fluticasone (FLONASE) 50 MCG/ACT nasal spray Spray 2 sprays into both nostrils daily 3/24/21   Louis Browning DO   Fluticasone-Umeclidin-Vilanterol (TRELEGY ELLIPTA) 100-62.5-25 MCG/INH oral inhaler Inhale 1 puff into the lungs daily 9/23/21   Louisa Hurley MD   hydrOXYzine (VISTARIL) 50 MG capsule Take 1-2 capsules orally every 6 hours as needed for anxiety and insomnia. 7/20/21   Louis Browning DO   lisinopril (ZESTRIL) 20 MG tablet Take 1 tablet (20 mg) by mouth daily 1/24/22   Louisa Hurley MD   multivitamin, therapeutic (THERA-VIT) TABS tablet Take 1 tablet by mouth daily    Unknown, Entered By History   order for DME Equipment being ordered: Other: Nebulizer machine  Treatment Diagnosis: Pneumonia, reactive airway 10/18/17   Alexander,  "Jorge L MELTON DO   QUEtiapine (SEROQUEL) 100 MG tablet Take 1 tablet (100 mg) by mouth At Bedtime 1/24/22   Louisa Hurley MD   thin (NO BRAND SPECIFIED) lancets Use with lanceting device. 10/18/17   Jorge L Munoz DO   triamcinolone (KENALOG) 0.1 % external cream Every other day on affected area for 2 weeks . 11/8/21   Louisa Hurley MD       Allergies   Allergen Reactions     Pollen Extract         REVIEW OF SYSTEMS:   5 point ROS negative except as noted above in HPI, including Gen., Resp., CV, GI &  system review.    PHYSICAL EXAM:   There were no vitals taken for this visit. Estimated body mass index is 28.35 kg/m  as calculated from the following:    Height as of 1/24/22: 1.702 m (5' 7\").    Weight as of 1/24/22: 82.1 kg (181 lb).   GENERAL APPEARANCE: alert, and oriented  MENTAL STATUS: alert  AIRWAY EXAM: Mallampatti Class I (visualization of the soft palate, fauces, uvula, anterior and posterior pillars)  RESP: lungs clear to auscultation - no rales, rhonchi or wheezes  CV: regular rates and rhythm  DIAGNOSTICS:    Not indicated    IMPRESSION   ASA Class 2 - Mild systemic disease    PLAN:   Plan for Colonoscopy with possible biopsy, possible polypectomy. We discussed the risks, benefits and alternatives and the patient wished to proceed.    The above has been forwarded to the consulting provider.      Signed Electronically by: Mike Reid MD  February 1, 2022          "

## 2022-02-04 LAB
PATH REPORT.COMMENTS IMP SPEC: NORMAL
PATH REPORT.COMMENTS IMP SPEC: NORMAL
PATH REPORT.FINAL DX SPEC: NORMAL
PATH REPORT.GROSS SPEC: NORMAL
PATH REPORT.MICROSCOPIC SPEC OTHER STN: NORMAL
PATH REPORT.RELEVANT HX SPEC: NORMAL
PHOTO IMAGE: NORMAL

## 2022-02-04 PROCEDURE — 88305 TISSUE EXAM BY PATHOLOGIST: CPT | Mod: 26 | Performed by: PATHOLOGY

## 2022-04-20 DIAGNOSIS — E78.5 HYPERLIPIDEMIA LDL GOAL <100: ICD-10-CM

## 2022-04-20 RX ORDER — ATORVASTATIN CALCIUM 10 MG/1
10 TABLET, FILM COATED ORAL DAILY
Qty: 90 TABLET | Refills: 2 | Status: SHIPPED | OUTPATIENT
Start: 2022-04-20 | End: 2022-11-04

## 2022-05-04 ENCOUNTER — OFFICE VISIT (OUTPATIENT)
Dept: PODIATRY | Facility: CLINIC | Age: 62
End: 2022-05-04
Payer: COMMERCIAL

## 2022-05-04 VITALS
WEIGHT: 180 LBS | SYSTOLIC BLOOD PRESSURE: 122 MMHG | HEIGHT: 67 IN | DIASTOLIC BLOOD PRESSURE: 76 MMHG | BODY MASS INDEX: 28.25 KG/M2

## 2022-05-04 DIAGNOSIS — M79.674 TOE PAIN, RIGHT: Primary | ICD-10-CM

## 2022-05-04 DIAGNOSIS — L60.2 NAIL HYPERTROPHY: ICD-10-CM

## 2022-05-04 DIAGNOSIS — L85.3 DRY SKIN: ICD-10-CM

## 2022-05-04 DIAGNOSIS — R09.89 DECREASED PEDAL PULSES: ICD-10-CM

## 2022-05-04 DIAGNOSIS — L60.8 NAIL DEFORMITY: ICD-10-CM

## 2022-05-04 DIAGNOSIS — L60.0 INGROWN TOENAIL OF RIGHT FOOT: ICD-10-CM

## 2022-05-04 PROCEDURE — 11721 DEBRIDE NAIL 6 OR MORE: CPT | Mod: GA | Performed by: PODIATRIST

## 2022-05-04 PROCEDURE — 99213 OFFICE O/P EST LOW 20 MIN: CPT | Mod: 25 | Performed by: PODIATRIST

## 2022-05-04 RX ORDER — AMMONIUM LACTATE 12 G/100G
CREAM TOPICAL 2 TIMES DAILY
Qty: 385 G | Refills: 1 | Status: SHIPPED | OUTPATIENT
Start: 2022-05-04 | End: 2023-01-05

## 2022-05-04 NOTE — LETTER
5/4/2022         RE: Alfa Cm  [address]  [place] [facility] [address]        Dear Colleague,    Thank you for referring your patient, Alfa Cm, to the [facility]. Please see a copy of my visit note below.    ASSESSMENT:  Encounter Diagnoses   Name Primary?     Toe pain, right Yes     Ingrown toenail of right foot      Nail deformity      Nail hypertrophy      Decreased pedal pulses      Dry pedal skin      MEDICAL DECISION MAKING:  His right second toe pain is consistent with the nail deformity, and ingrown toenail type scenario.  I did discuss the options of nail avulsion as well as chemical matrixectomy.  However, I am not confident that I can palpate his pedal pulses.  Prior to any nail procedure, to ensure that he can heal, I recommend noninvasive vascular studies.  He understands.    TANA/US ordered    I explained that debridement of the toenails might relieve his pain.  I explained that this done routinely [facility]. I offered to do it on a one-time basis.  The need to sign an ABN waiver form and the possibility of an out-of-pocket expense was discussed.    A list of alternatives for nail care was provided.    Alfa called his insurance. ABN signed.     Using a sterile nail nippers, debridement of 8 nails was performed.  Nails were shortened.  The thickest nails were cut in a fasion to skive off some of the bulk.  Subungual debris was cleared away where needed.      AmLactin 12% cream for his severely dry skin, both feet.    Follow-up on an as-needed basis.  Should he want nail removal, he is advised to schedule a 30-minute appointment for the procedure.    Disclaimer: This note consists of symbols derived from keyboarding, dictation and/or voice recognition software. As a result, there may be errors in the script that have gone undetected. Please consider this when interpreting information found in this chart.    Darshan Tipton, KIERA, FACFAS,  "MS [facility] Department of Podiatry/Foot & Ankle Surgery      ____________________________________________________________________    HPI:       Alfa presents today reporting an \"ingrown\" toenail involving his right second toe.  He is having shooting pain, rated a 7 out of 10.  Pain is worse with footwear.  This kept the area clean.  No other treatment.  He was last evaluated by Dr. Cruz in podiatry on 10/28/2020.  At that time a right second toe nail avulsion was performed.  *  Past Medical History:   Diagnosis Date     Alcohol abuse      Coronary artery disease involving native coronary artery without angina pectoris 2011    angiplasty, stent placed at [facility]     Hyperlipidemia      Hypertension      Myocardial infarction (H)      Substance abuse (H)      Tobacco abuse    *  *  Past Surgical History:   Procedure Laterality Date     COLONOSCOPY N/A 2/1/2022    Procedure: COLONOSCOPY, FLEXIBLE, WITH LESION REMOVAL USING SNARE and biopsy forcep;  Surgeon: Mike Reid MD;  Location:  GI     COLONOSCOPY N/A 2/1/2022    Procedure: COLONOSCOPY, WITH POLYPECTOMies AND BIOPSY;  Surgeon: Mike Reid MD;  Location:  GI     NE PATIENT HAS A CORONARY ARTERY STENT     *  *  Current Outpatient Medications   Medication Sig Dispense Refill     albuterol (PROAIR HFA/PROVENTIL HFA/VENTOLIN HFA) 108 (90 BASE) MCG/ACT Inhaler Inhale 1-2 puffs into the lungs every 6 hours as needed for shortness of breath / dyspnea or wheezing 1 Inhaler 5     alcohol swab prep pads Use to swab area of injection/kylee as directed. 100 each 3     Aspirin 81 MG CAPS Take 81 mg by mouth daily 90 capsule 1     atorvastatin (LIPITOR) 10 MG tablet Take 1 tablet (10 mg) by mouth daily 90 tablet 2     carvedilol (COREG) 25 MG tablet Take 1 tablet (25 mg) by mouth 2 times daily (with meals) 180 tablet 3     fexofenadine (ALLEGRA) 180 MG tablet Take 1 tablet (180 mg) by mouth daily 90 tablet 3     fluticasone (FLONASE) 50 " "MCG/ACT nasal spray Spray 2 sprays into both nostrils daily 16 g 2     Fluticasone-Umeclidin-Vilanterol (TRELEGY ELLIPTA) 100-62.5-25 MCG/INH oral inhaler Inhale 1 puff into the lungs daily 60 each 3     hydrOXYzine (VISTARIL) 50 MG capsule Take 1-2 capsules orally every 6 hours as needed for anxiety and insomnia. 180 capsule 0     lisinopril (ZESTRIL) 20 MG tablet Take 1 tablet (20 mg) by mouth daily 90 tablet 4     multivitamin, therapeutic (THERA-VIT) TABS tablet Take 1 tablet by mouth daily       order for DME Equipment being ordered: Other: Nebulizer machine  Treatment Diagnosis: Pneumonia, reactive airway 1 Device 0     QUEtiapine (SEROQUEL) 100 MG tablet Take 1 tablet (100 mg) by mouth At Bedtime 90 tablet 1     thin (NO BRAND SPECIFIED) lancets Use with lanceting device. 100 each 1     triamcinolone (KENALOG) 0.1 % external cream Every other day on affected area for 2 weeks . 45 g 0         EXAM:    Vitals: /76   Ht 1.702 m (5' 7\")   Wt 81.6 kg (180 lb)   BMI 28.19 kg/m    BMI: Body mass index is 28.19 kg/m .    Constitutional:  Alfa Cm is in no apparent distress, appears well-nourished.  Cooperative with history and physical exam.    Vascular:  Pedal pulses are faintly palpable for both the DP and PT arteries on the left. They are not readily palpable on the right.      Neuro: Light touch sensation is intact to the L4, L5, S1 distributions  No evidence of weakness, spasticity, or contracture in the lower extremities.     Derm: Normal texture and turgor.  No erythema, ecchymosis, or cyanosis.  No open lesions.  Generalized xerosis of both feet with exfoliation of skin.  Toenails are thickened, discolored and deformed, notably the right second toenail.  With axial pressure on the nail plate, he has some discomfort.  No associated clinical signs of infection.    Musculoskeletal:    Lower extremity muscle strength is normal.  Flatfoot structure.  Mild digital contractures.  The right second " toe is the longest toe on that foot.  It has mild contracture.          Again, thank you for allowing me to participate in the care of your patient.        Sincerely,        Darshan Tipton DPM

## 2022-05-04 NOTE — PATIENT INSTRUCTIONS
Thank you for choosing [facility] Podiat[facility] / Foot & Ankle Surgery!    DR. GARVIN'S CLINIC LOCATIONS:     [facility] TRIAGE LINE: [phone]   [address] [place] [address] APPOINTMENTS: [phone]   [place] [address] RADIOLOGY: [phone]    SET UP SURGERY: [phone]    BILLING QUESTIONS: [phone]   [facility] FAX: [phone] [id number]    [place] [address]      Follow up: after TANA    Next steps: TANA, amlactin cream Rx      ROUTINE FOOT CARE (NAIL TRIMMING / CALLUSES)    Go to [email or web address].org ([employer]) and search for providers near you.  Otherwise, this is a list we have gathered of  recommended locations/providers in [place].    [facility]   [phone]   [facility]  [phone]  [email or web address]   [facility]  [phone]  [facility] 15 mile radius [facility]  [phone]  [facility].   [facility]  [address], [place] [address] [phone] Luis Daniel Samson DPM  [address] [place] [address] [phone]   [facility]  [phone] [address] [place] [address]  [facility]  Dr. Too Chung  [phone]  [place], [place]   [facility]  [phone]  [facility]  (does not take BCBS) FYI:  *Some providers accept insurance while others are out of pocket. Please contact them for details*        SIGNS OF INFECTION  expanding redness around the wound   yellow or greenish-colored pus or cloudy wound drainage   red streaking spreading from the wound   increased swelling, tenderness, or pain around the wound   fever  *If you notice any of these signs of infection, call us right away!

## 2022-05-04 NOTE — PROGRESS NOTES
"ASSESSMENT:  Encounter Diagnoses   Name Primary?     Toe pain, right Yes     Ingrown toenail of right foot      Nail deformity      Nail hypertrophy      Decreased pedal pulses      Dry pedal skin      MEDICAL DECISION MAKING:  His right second toe pain is consistent with the nail deformity, and ingrown toenail type scenario.  I did discuss the options of nail avulsion as well as chemical matrixectomy.  However, I am not confident that I can palpate his pedal pulses.  Prior to any nail procedure, to ensure that he can heal, I recommend noninvasive vascular studies.  He understands.    TANA/US ordered    I explained that debridement of the toenails might relieve his pain.  I explained that this done routinely [facility]. I offered to do it on a one-time basis.  The need to sign an ABN waiver form and the possibility of an out-of-pocket expense was discussed.    A list of alternatives for nail care was provided.    Alfa called his insurance. ABN signed.     Using a sterile nail nippers, debridement of 8 nails was performed.  Nails were shortened.  The thickest nails were cut in a fasion to skive off some of the bulk.  Subungual debris was cleared away where needed.      AmLactin 12% cream for his severely dry skin, both feet.    Follow-up on an as-needed basis.  Should he want nail removal, he is advised to schedule a 30-minute appointment for the procedure.    Disclaimer: This note consists of symbols derived from keyboarding, dictation and/or voice recognition software. As a result, there may be errors in the script that have gone undetected. Please consider this when interpreting information found in this chart.    Darshan Tipton, KIERA, FACFAS, MS    [facility] Department of Podiatry/Foot & Ankle Surgery      ____________________________________________________________________    HPI:       Alfa presents today reporting an \"ingrown\" toenail involving his right second toe.  He is having shooting " pain, rated a 7 out of 10.  Pain is worse with footwear.  This kept the area clean.  No other treatment.  He was last evaluated by Dr. Cruz in podiatry on 10/28/2020.  At that time a right second toe nail avulsion was performed.  *  Past Medical History:   Diagnosis Date     Alcohol abuse      Coronary artery disease involving native coronary artery without angina pectoris 2011    angiplasty, stent placed at [facility]     Hyperlipidemia      Hypertension      Myocardial infarction (H)      Substance abuse (H)      Tobacco abuse    *  *  Past Surgical History:   Procedure Laterality Date     COLONOSCOPY N/A 2/1/2022    Procedure: COLONOSCOPY, FLEXIBLE, WITH LESION REMOVAL USING SNARE and biopsy forcep;  Surgeon: Mike Reid MD;  Location:  GI     COLONOSCOPY N/A 2/1/2022    Procedure: COLONOSCOPY, WITH POLYPECTOMies AND BIOPSY;  Surgeon: Mike Reid MD;  Location:  GI     [place] PATIENT HAS A CORONARY ARTERY STENT     *  *  Current Outpatient Medications   Medication Sig Dispense Refill     albuterol (PROAIR HFA/PROVENTIL HFA/VENTOLIN HFA) 108 (90 BASE) MCG/ACT Inhaler Inhale 1-2 puffs into the lungs every 6 hours as needed for shortness of breath / dyspnea or wheezing 1 Inhaler 5     alcohol swab prep pads Use to swab area of injection/kylee as directed. 100 each 3     Aspirin 81 MG CAPS Take 81 mg by mouth daily 90 capsule 1     atorvastatin (LIPITOR) 10 MG tablet Take 1 tablet (10 mg) by mouth daily 90 tablet 2     carvedilol (COREG) 25 MG tablet Take 1 tablet (25 mg) by mouth 2 times daily (with meals) 180 tablet 3     fexofenadine (ALLEGRA) 180 MG tablet Take 1 tablet (180 mg) by mouth daily 90 tablet 3     fluticasone (FLONASE) 50 MCG/ACT nasal spray Spray 2 sprays into both nostrils daily 16 g 2     Fluticasone-Umeclidin-Vilanterol (TRELEGY ELLIPTA) 100-62.5-25 MCG/INH oral inhaler Inhale 1 puff into the lungs daily 60 each 3     hydrOXYzine (VISTARIL) 50 MG capsule Take 1-2  "capsules orally every 6 hours as needed for anxiety and insomnia. 180 capsule 0     lisinopril (ZESTRIL) 20 MG tablet Take 1 tablet (20 mg) by mouth daily 90 tablet 4     multivitamin, therapeutic (THERA-VIT) TABS tablet Take 1 tablet by mouth daily       order for DME Equipment being ordered: Other: Nebulizer machine  Treatment Diagnosis: Pneumonia, reactive airway 1 Device 0     QUEtiapine (SEROQUEL) 100 MG tablet Take 1 tablet (100 mg) by mouth At Bedtime 90 tablet 1     thin (NO BRAND SPECIFIED) lancets Use with lanceting device. 100 each 1     triamcinolone (KENALOG) 0.1 % external cream Every other day on affected area for 2 weeks . 45 g 0         EXAM:    Vitals: /76   Ht 1.702 m (5' 7\")   Wt 81.6 kg (180 lb)   BMI 28.19 kg/m    BMI: Body mass index is 28.19 kg/m .    Constitutional:  Alfa Cm is in no apparent distress, appears well-nourished.  Cooperative with history and physical exam.    Vascular:  Pedal pulses are faintly palpable for both the DP and PT arteries on the left. They are not readily palpable on the right.      Neuro: Light touch sensation is intact to the L4, L5, S1 distributions  No evidence of weakness, spasticity, or contracture in the lower extremities.     Derm: Normal texture and turgor.  No erythema, ecchymosis, or cyanosis.  No open lesions.  Generalized xerosis of both feet with exfoliation of skin.  Toenails are thickened, discolored and deformed, notably the right second toenail.  With axial pressure on the nail plate, he has some discomfort.  No associated clinical signs of infection.    Musculoskeletal:    Lower extremity muscle strength is normal.  Flatfoot structure.  Mild digital contractures.  The right second toe is the longest toe on that foot.  It has mild contracture.      "

## 2022-05-24 NOTE — PROGRESS NOTES
Pre-Visit Planning   Next 5 appointments (look out 90 days)    May 31, 2022  2:00 PM  (Arrive by 1:40 PM)  Provider Visit with Louisa Hurlye MD  [facility] ([facility] ) [address] [facility] [address] [phone]           Appointment Notes for this encounter:   Med check     Questionnaires Reviewed/Assigned  No additional questionnaires are needed     Patient preferred phone number: [phone]     MyChart message sent, and phone message left (return call) to facilitate completion of PVP. Unable to reach.

## 2022-05-31 ENCOUNTER — OFFICE VISIT (OUTPATIENT)
Dept: FAMILY MEDICINE | Facility: CLINIC | Age: 62
End: 2022-05-31
Payer: COMMERCIAL

## 2022-05-31 VITALS
BODY MASS INDEX: 28.2 KG/M2 | DIASTOLIC BLOOD PRESSURE: 88 MMHG | HEART RATE: 85 BPM | TEMPERATURE: 98 F | HEIGHT: 67 IN | RESPIRATION RATE: 16 BRPM | OXYGEN SATURATION: 97 % | WEIGHT: 179.7 LBS | SYSTOLIC BLOOD PRESSURE: 138 MMHG

## 2022-05-31 DIAGNOSIS — I10 BENIGN ESSENTIAL HYPERTENSION: Primary | ICD-10-CM

## 2022-05-31 DIAGNOSIS — I25.10 CORONARY ARTERY DISEASE INVOLVING NATIVE CORONARY ARTERY OF NATIVE HEART WITHOUT ANGINA PECTORIS: ICD-10-CM

## 2022-05-31 DIAGNOSIS — Z13.9 SCREENING FOR CONDITION: ICD-10-CM

## 2022-05-31 DIAGNOSIS — I50.42 CHRONIC COMBINED SYSTOLIC AND DIASTOLIC CONGESTIVE HEART FAILURE (H): ICD-10-CM

## 2022-05-31 LAB
ANION GAP SERPL CALCULATED.3IONS-SCNC: 5 MMOL/L (ref 3–14)
BUN SERPL-MCNC: 9 MG/DL (ref 7–30)
CALCIUM SERPL-MCNC: 9.7 MG/DL (ref 8.5–10.1)
CHLORIDE BLD-SCNC: 107 MMOL/L (ref 94–109)
CO2 SERPL-SCNC: 27 MMOL/L (ref 20–32)
CREAT SERPL-MCNC: 0.85 MG/DL (ref 0.66–1.25)
GFR SERPL CREATININE-BSD FRML MDRD: >90 ML/MIN/1.73M2
GLUCOSE BLD-MCNC: 110 MG/DL (ref 70–99)
POTASSIUM BLD-SCNC: 4 MMOL/L (ref 3.4–5.3)
SODIUM SERPL-SCNC: 139 MMOL/L (ref 133–144)
TSH SERPL DL<=0.005 MIU/L-ACNC: 0.74 MU/L (ref 0.4–4)

## 2022-05-31 PROCEDURE — 84443 ASSAY THYROID STIM HORMONE: CPT | Performed by: FAMILY MEDICINE

## 2022-05-31 PROCEDURE — 36415 COLL VENOUS BLD VENIPUNCTURE: CPT | Performed by: FAMILY MEDICINE

## 2022-05-31 PROCEDURE — 80048 BASIC METABOLIC PNL TOTAL CA: CPT | Performed by: FAMILY MEDICINE

## 2022-05-31 PROCEDURE — 90670 PCV13 VACCINE IM: CPT | Performed by: FAMILY MEDICINE

## 2022-05-31 PROCEDURE — 99214 OFFICE O/P EST MOD 30 MIN: CPT | Mod: 25 | Performed by: FAMILY MEDICINE

## 2022-05-31 PROCEDURE — 90471 IMMUNIZATION ADMIN: CPT | Performed by: FAMILY MEDICINE

## 2022-05-31 RX ORDER — LISINOPRIL 20 MG/1
20 TABLET ORAL DAILY
Qty: 90 TABLET | Refills: 4 | Status: SHIPPED | OUTPATIENT
Start: 2022-05-31 | End: 2022-11-04

## 2022-06-03 ASSESSMENT — ENCOUNTER SYMPTOMS
SHORTNESS OF BREATH: 0
PALPITATIONS: 0
BACK PAIN: 0
COUGH: 0
FATIGUE: 0
HEADACHES: 0
ARTHRALGIAS: 0
FEVER: 0
AGITATION: 0

## 2022-09-06 ENCOUNTER — TELEPHONE (OUTPATIENT)
Dept: CARDIOLOGY | Facility: CLINIC | Age: 62
End: 2022-09-06
Payer: COMMERCIAL

## 2022-09-06 DIAGNOSIS — Z00.6 RESEARCH SUBJECT: Primary | ICD-10-CM

## 2022-09-06 PROCEDURE — 99207 PR NO CHARGE NURSE ONLY: CPT

## 2022-09-06 NOTE — TELEPHONE ENCOUNTER
"  Oban Study Pre-Visit Call      Study description:   [employer] PPG Study. The purpose of this research study is to collect data related to health for the development of mobile technologies. This data will include physiological signal recordings from medical devices and data collection software on [employer] Watches (\"study watches\"). This study is not to provide any treatment nor assess safety and effectiveness, but rather to collect information for research and  purposes.     Alfa Cm a 62 year old male, was called today to discuss participation in the Oban study. The following was reviewed with the patient.       You agree to comply with COVID precautionary measures required by local public health ordinances, workplace health and safety protocols, and/or the Study Team. Such measure may include, for example, wearing a mask, complying with social distancing guidelines, and/ or providing evidence of negative COVID-19 test result Yes       You are fully vaccinated per the most recent CDC guidelines No, will obtain covid swab      You do not have any of the following symptoms: fever or chills; difficulty breathing; sustained loss of taste smell, or appetite. Yes      You do not have any of the following unexplained symptoms: fatigue or nausea; whole body muscle aches; new or unexpected headache; sore throat; congestion or runny nose; diarrhea or vomiting Yes      You have not had close contact with someone who is suspected or confirmed to have active COVID-19 in the last 14 days.Yes    Lyft Scheduled     Reminders    Please come 10 minutes early for your scheduled appointment time.    Bring your vaccination card with you to your scheduled appointment.     No smoking 2 hours prior to your appointment time.    Wear loose shirt.    Eat before you arrive.     Anuj Shi RN       "

## 2022-09-08 ENCOUNTER — ALLIED HEALTH/NURSE VISIT (OUTPATIENT)
Dept: CARDIOLOGY | Facility: CLINIC | Age: 62
End: 2022-09-08

## 2022-09-08 ENCOUNTER — OFFICE VISIT (OUTPATIENT)
Dept: CARDIOLOGY | Facility: CLINIC | Age: 62
End: 2022-09-08
Payer: COMMERCIAL

## 2022-09-08 VITALS
HEIGHT: 68 IN | DIASTOLIC BLOOD PRESSURE: 92 MMHG | WEIGHT: 179.9 LBS | HEART RATE: 71 BPM | BODY MASS INDEX: 27.26 KG/M2 | SYSTOLIC BLOOD PRESSURE: 151 MMHG | TEMPERATURE: 98.2 F | OXYGEN SATURATION: 95 % | RESPIRATION RATE: 16 BRPM

## 2022-09-08 DIAGNOSIS — Z00.6 EXAMINATION OF PARTICIPANT OR CONTROL IN CLINICAL RESEARCH: Primary | ICD-10-CM

## 2022-09-08 DIAGNOSIS — J44.9 CHRONIC OBSTRUCTIVE PULMONARY DISEASE, UNSPECIFIED COPD TYPE (H): Primary | ICD-10-CM

## 2022-09-08 DIAGNOSIS — Z00.6 EXAMINATION OF PARTICIPANT OR CONTROL IN CLINICAL RESEARCH: ICD-10-CM

## 2022-09-08 PROCEDURE — 99207 PR NO CHARGE-RESEARCH SERVICE: CPT

## 2022-09-08 PROCEDURE — 99207 PR NO CHARGE NURSE ONLY: CPT

## 2022-09-08 NOTE — PROGRESS NOTES
At-Home [facility] Inclusion/Exclusion Criteria:     Study Name: [facility]  : Juventino Ramos MD    Protocol version: 4.0 - [id number]    Criteria #  Inclusion Criterita (ALL MUST BE YES)  YES/NO/N/A   1   Male and female subjects at least 18 years old at the time of the screening visit.  Yes   2   Wrist circumference and 120mm-245mm (inclusive).  Yes   3   Ability to understand and provide written informed consent.  Yes   4   Willing and able to comply with study procedures, activities, and duration as described in the ICF.   Yes   5  If not vaccinated and up to date with COVID -19 vaccinations, willing to take a rapid AG test, or PCR test, and produce a negative result within 3 days of the study visit(s).   Yes   6   Didn't smoke at least 2 hours before screening (or study procedures).  Yes   7   Neither subject, nor any individuals living with subject, have had new development in the following within the last 14 days prior to study screening:        a. Have failed to comply with any country, state, and local travel restrictions.         b. Have had any unexpected flu-like symptoms (such as fever, chills, cough, shortness of breath, diarrhea, sore throat, runny nose, or trouble breathing).        c. Have had any contact with people confirmed COVID-19.         d. Have been confirmed to have COVID-19 and have not subsequently received a negative COVID-19 test result.    Yes   8   If Cohort 1 (In-Lab Cardiac Conditions):         a. Indication of a rhythm disorder (dated up to 5 years ago) as outlined in Table A (see Protocol page 9), and be present at the time of screening.     NA   9   If Cohort 2 (In-Lab Respiratory Conditions):          a. Prior diagnosis of one of the following conditions, within 5 years: 1) Moderate (GOLD Stage 2) COPD, 2) Severe or Very Severe (GOLD Stage 3 or 4) COPD, 3) Idiopathic pulmonary fibrosis.          b. Record of spirometry FEV% result (within 5 years) are available.    NA    10   If Cohort 3 (At-Home respiratory Conditions):         a. Prior diagnosis of one of the following conditions, within 5 years: 1) Moderate (GOLD Stage 2) COPD, 2) Severe or Very Severe (GOLD Stage 3 or 4) COPD, 3) Idiopathic pulmonary fibrosis.          b. Record of spirometry FEV% result (within 5 years) are available.          c. Willing and able to use a study provided iPhone and navigate study Rubén flow.          d. Stable WIFI at home and are able to connect it to study iPhones     Yes       Criteria # Exclusion Criteria (ALL MUST BE NO) YES/NO/N/A   1   Individuals with severe contact allergies to standard adhesives, or other materials found in pulse oximetry sensors, ECG electrodes, respiration monitor electrodes, wearables device bands and watch surfaces.    No   2   Individuals that do not have at least 2 intact fingers (excluding thumb, *pinky will be excluded only for cohort 1 and cohort 2) on non-preferred hand to wear a watch.    No   3   Open wound(s) or active infections on wrists at study watch wear locations or where the ECG electrodes may be placed.    No   4   Physical disability that prevents safe and adequate testing.  No   5   Individuals with a pacemaker or an automated implantable cardioverter-defibrillator (AICD).    No   6   Individuals with physical scars, tattoos, or other skin markings on wrists where sensors or finger sensor are to be worn.    No   7   Individuals with clinically significant hand tremors, as judged by a Study Investigator.    No   8   Pregnant women.     No   9   Subjects with any medical history, physical exam, vital sign or any other study procedure finding/assessment that in the opinion of the Investigator could compromise subject safety during study participation or interfere with the study integrity and/or the accurate assessment of the study objectives.    No   10   Presence of skin conditions or disease at the fingers of SpO2% application sites that could  interfere with SpO2% sensor placement or the accuracy of measurement. Such conditions include, but are not limited to: extensive scarring, skin lesions, redness, infection or edema at target measurement sites.     No   11   Presence of long fingernails that interfere with the placement of the SpO2% sensor or nail polish at the fingers of SpO2% application sites.    No   12   Medical history or physical assessment finding that makes the subject inappropriate for participation, according to the investigator.    No     Patient does fulfill study inclusion criteria and no exclusion criteria are found.     Juventino Ramos MD    08-SEP-2022    [facility]

## 2022-09-08 NOTE — PROGRESS NOTES
"[facility] Study Physical     A rapid Covid-19 test was performed on site and was negative prior to study enrollment.     Medical History Reviewed? Yes    Physical Examination  For abnormal findings, please evaluate if the finding is Clinically Significant (by 'CS') or Not Clinically Significant (by 'NCS')  General Appearance   Normal  Head and Neck   Normal  Lungs     Normal  Cardiovascular   Normal  Abdomen    Normal  Musculoskeletal/Extremities  Normal   Lymph Nodes    Normal  Skin     Normal  Neurological    Normal    Tremor (If present document)  Absent           Vitals:  /92 Important   (BP Location: Right arm, Patient Position: Chair, Cuff Size: Adult Regular)   Pulse 71   Temp 98.2  F (36.8  C) (Oral)   Resp 16   Ht 1.737 m (5' 8.39\")   Wt 81.6 kg (179 lb 14.3 oz)   SpO2 95%   BMI 27.05 kg/m    BSA 1.98 m        COVID: No symptoms, chills, shortness of breath, or difficulty breathing, muscle or body aches, headache, loss of taste or smell, sore throat, runny nose, congestion, nausea, vomiting or diarrhea.according to the [place] Department of Health and Human Services based on the CARES Act.     COVID Vaccinations:   Immunization History   Administered Date(s) Administered     COVID-19,PF,Moderna 06/07/2021, 07/02/2021     COVID-19,PF,Moderna Booster 01/11/2022     DTaP, Unspecified 05/23/2012     Influenza (IIV3) PF 11/02/2011, 10/26/2012     Influenza Quad, Recombinant, pf(RIV4) (Flublok) 09/23/2021     Influenza Vaccine IM > 6 months Valent IIV4 (Alfuria,Fluzone) 10/21/2014, 10/01/2016, 10/12/2017, 10/21/2019     Influenza,INJ,MDCK,PF,Quad >6mo(Flucelvax-RMG) 11/04/2020     Mantoux Tuberculin Skin Test 02/16/2018     Pneumo Conj 13-V (2010&after) 05/31/2022     Pneumococcal 23 valent 12/19/2012, 10/12/2017     TDAP Vaccine (Adacel) 05/23/2012       Smoking History  Are you currently smoking or vaping? yes  How Many Years Have You Smoked or Vaped? 35 years  Packs or E-Cigs Per Day: 1/2 pack of " cigarettes    Electrocardiogram   ECG not applicable as subject is in the respiratory cohort.     Respiratory Conditions:   COPD    Spirometer Test Results (FEV%): 54%  Condition Severity: Moderate - Stage 2 (FEV 50-79%)      Karin Singh NP

## 2022-09-08 NOTE — PROGRESS NOTES
[facility] Study At-Home Consent      Study description:   Multiconditions PPG Sub-Study. The purpose of this research study is to collect data related to health for the development of mobile technologies. This data will include physiological signal recordings from medical devices and smart watch data collection software. This study is not to provide any treatment. This study will only collect information for research and  purposes.     Alfa Cm a 62 year old male, was onsite today to discuss participation in the At-Home portion of the [facility] study.   The consent form was reviewed with the patient.     The review of the study included:    Study Purpose     COVID-19 Criteria     At-Home Study Participation    Participant Responsibilities      Study Data and Devices    Benefits and Risks of Participation    Compensation and Costs of Participation    Voluntary Participation    Confidentiality     Injury and Legal Rights    The subject was queried in regards to his willingness to continue and his questions were answered to his satisfaction.     The patient has given his agreement to volunteer to participate in the above noted study.     The At-Home consent form and HIPPA form version 23 Aug 2022 was signed 08-SEP-2022 onsite in the Clinic Research Unit.     A copy of the At-Home [facility] consent will be placed in subject's medical record.  A copy of the consent form was given to the subject today.    Study data is directly entered into Epic per protocol.     No study procedures were done prior to Alfa Cm providing informed consent.       [facility]

## 2022-09-08 NOTE — PROGRESS NOTES
[facility] Study At-Home Note      Study description: Multiconditions PPG Sub-Study. The purpose of this research study is to collect data related to health for the development of mobile technologies. This data will include physiological signal recordings from medical devices and smart [employer] data collection software. This study is not to provide any treatment. This study will only collect information for research and  purposes.     Subject ID:  [id number]       SCREENING     Demographic Info  Alfa Cm   1960          62 year old  male    Past Medical History:   Diagnosis Date    COPD 2021     Alcohol abuse      Coronary artery disease involving native coronary artery without angina pectoris 2011    angiplasty, stent placed at [facility]     Hyperlipidemia      Hypertension      Myocardial infarction (H)      Substance abuse (H)      Tobacco abuse        Current Outpatient Medications:      albuterol (PROAIR HFA/PROVENTIL HFA/VENTOLIN HFA) 108 (90 BASE) MCG/ACT Inhaler, Inhale 1-2 puffs into the lungs every 6 hours as needed for shortness of breath / dyspnea or wheezing, Disp: 1 Inhaler, Rfl: 5     alcohol swab prep pads, Use to swab area of injection/kylee as directed., Disp: 100 each, Rfl: 3     ammonium lactate (AMLACTIN) 12 % external cream, Apply topically 2 times daily, Disp: 385 g, Rfl: 1     Aspirin 81 MG CAPS, Take 81 mg by mouth daily, Disp: 90 capsule, Rfl: 1     atorvastatin (LIPITOR) 10 MG tablet, Take 1 tablet (10 mg) by mouth daily, Disp: 90 tablet, Rfl: 2     carvedilol (COREG) 25 MG tablet, Take 1 tablet (25 mg) by mouth 2 times daily (with meals), Disp: 180 tablet, Rfl: 3     fexofenadine (ALLEGRA) 180 MG tablet, Take 1 tablet (180 mg) by mouth daily, Disp: 90 tablet, Rfl: 3     fluticasone (FLONASE) 50 MCG/ACT nasal spray, Spray 2 sprays into both nostrils daily, Disp: 16 g, Rfl: 2     Fluticasone-Umeclidin-Vilanterol (TRELEGY ELLIPTA) 100-62.5-25 MCG/INH  "oral inhaler, Inhale 1 puff into the lungs daily, Disp: 60 each, Rfl: 3     hydrOXYzine (VISTARIL) 50 MG capsule, Take 1-2 capsules orally every 6 hours as needed for anxiety and insomnia., Disp: 180 capsule, Rfl: 0     lisinopril (ZESTRIL) 20 MG tablet, Take 1 tablet (20 mg) by mouth daily, Disp: 90 tablet, Rfl: 4     multivitamin, therapeutic (THERA-VIT) TABS tablet, Take 1 tablet by mouth daily, Disp: , Rfl:      order for DME, Equipment being ordered: Other: Nebulizer machine Treatment Diagnosis: Pneumonia, reactive airway, Disp: 1 Device, Rfl: 0     QUEtiapine (SEROQUEL) 100 MG tablet, Take 1 tablet (100 mg) by mouth At Bedtime, Disp: 90 tablet, Rfl: 1     thin (NO BRAND SPECIFIED) lancets, Use with lanceting device., Disp: 100 each, Rfl: 1     triamcinolone (KENALOG) 0.1 % external cream, Every other day on affected area for 2 weeks ., Disp: 45 g, Rfl: 0    Allergies   Allergen Reactions     Pollen Extract         Past Surgical History:   Procedure Laterality Date     COLONOSCOPY N/A 2/1/2022     COLONOSCOPY N/A 2/1/2022     CT PATIENT HAS A CORONARY ARTERY STENT              Child-Bearing Potential?: N/A (Male)    Race: Black or   Race (Secondary): N/A    : No    Ethnicity: Non-/     Vitals:  Time Subject Sat: 1301   BP (!) 151/92 (BP Location: Right arm, Patient Position: Chair, Cuff Size: Adult Regular)   Pulse 71   Temp 98.2  F (36.8  C) (Oral)   Resp 16   Ht 1.737 m (5' 8.39\")   Wt 81.6 kg (179 lb 14.3 oz)   SpO2 95%   BMI 27.05 kg/m       Sponsor Expected Values   Blood Pressure: SBP: ; DBP: 40-90  Pulse:  bpm  Temp: 35.5-37.5  C  Respiration: 10-23  Ht: in cm  Wt: in kg  SpO2%: %  BMI: Rounded to nearest whole number      Screening Info:  Respiratory Conditions: COPD    Spirometer Test Results (FEV%): 54    Condition Severity: Moderate - Stage 2 (FEV 50-79%)    Sleep Conditions:  Sleep Apnea Diagnosis: Yes  Use of CPAP at Night: " No  Stop Breathing or Gasping/Gurgling Sounds at Night: No  Snoring at Night: No    SPO2%  ? 95% during 3 minutes? No    Oxygen Therapy: No    Minutes of Exercise per Week: <20  Type of Activity: No activity       Measurements & Preferences:  Dominant Hand: Right   Preferred Watch Hand: Right     Volunteer-Reported Castro Scale: 6  Staff-Recorded Castro Scale: 6    Hairiness Level: A: Thin Hair, Low Density     Wrist Circumference:  Left: 174 mm       Right: 168 mm    Spectrometer Values:            Left:   L*: 38.99    A*: 12.22   B*: 13.93      Right: L*: 37.74    A*: 11.38   B*: 12.86         STUDY PROCEDURE DATA      Study Date: 09/08/22  Study Time (Education Start Time): 13:55:00   Device IDs:  Day Watch ID 1: [id number]    Night Watch ID 2: [id number]    Watch Enclosure 1: Aluminum      Watch Enclosure 2: Aluminum   Watch Size 1: 44 mm  Watch Size 2: 44 mm  Nonin ID 1:  [id number]  Nonin ID 2: [id number]   Lifestyle:  Moisturizing Cream/Lotion applied at wrist? No  Additional Comments (Device/participant issues): N/A     08-SEP-2022  [facility]

## 2022-09-09 ENCOUNTER — VIRTUAL VISIT (OUTPATIENT)
Dept: CARDIOLOGY | Facility: CLINIC | Age: 62
End: 2022-09-09
Payer: COMMERCIAL

## 2022-09-09 DIAGNOSIS — Z00.6 EXAMINATION OF PARTICIPANT OR CONTROL IN CLINICAL RESEARCH: Primary | ICD-10-CM

## 2022-09-09 PROCEDURE — 99207 PR NO CHARGE NURSE ONLY: CPT

## 2022-09-12 NOTE — PROGRESS NOTES
"[facility] Study At-Home Phone Visit    Study description:   Multiconditions PPG Sub-Study. The purpose of this research study is to collect data related to health for the development of mobile technologies. This data will include physiological signal recordings from medical devices and smart watch data collection software. This study is not to provide any treatment. This study will only collect information for research and  purposes.       Subject Number: [id number]    Study Day: Day 4    [facility] study subject was called today to;     Confirm subjects are following the subject instructions     Address any technical difficulties     Answer any questions    Reminders Checklist:   [x]  Unlock the watches: Passcode - [id number]    -When placing them on the  or on themselves   [x]  Visually confirm Wi-Fi connection and charging setup  [x]  Unlock phones   -Unlock before placing on   -Should NOT show lock icon  [x]  Check Flubber calli for incomplete surveys   -Morning surveys: End of Night task on NIGHT phone    -Evening surveys: End of Day task on DAY phone  [x] Take devices off before showering/getting them wet  [x]  Make sure to dismiss any update notifications. -Tap \"Not Now\"  [x]  Good Nonin wear    -While resting/relaxing, not while typing doing anything hand intensive   [x]  Remind them of next appointment with us     Re-explained study procedures to patient who voiced understanding.     Adverse Events & Con Med Assessment Performed?   [x]  (If yes, please generate adverse event report for PI to carl)      12-SEP-2022    [facility]      "

## 2022-09-13 ENCOUNTER — ALLIED HEALTH/NURSE VISIT (OUTPATIENT)
Dept: CARDIOLOGY | Facility: CLINIC | Age: 62
End: 2022-09-13
Payer: COMMERCIAL

## 2022-09-13 DIAGNOSIS — Z00.6 EXAMINATION OF PARTICIPANT OR CONTROL IN CLINICAL RESEARCH: Primary | ICD-10-CM

## 2022-09-13 PROCEDURE — 99207 PR NO CHARGE NURSE ONLY: CPT

## 2022-09-15 ENCOUNTER — TELEPHONE (OUTPATIENT)
Dept: CARDIOLOGY | Facility: CLINIC | Age: 62
End: 2022-09-15
Payer: COMMERCIAL

## 2022-09-15 DIAGNOSIS — Z00.6 EXAMINATION OF PARTICIPANT OR CONTROL IN CLINICAL RESEARCH: Primary | ICD-10-CM

## 2022-09-15 PROCEDURE — 99207 PR NO CHARGE NURSE ONLY: CPT

## 2022-09-19 ENCOUNTER — VIRTUAL VISIT (OUTPATIENT)
Dept: CARDIOLOGY | Facility: CLINIC | Age: 62
End: 2022-09-19
Payer: COMMERCIAL

## 2022-09-19 DIAGNOSIS — Z00.6 EXAMINATION OF PARTICIPANT OR CONTROL IN CLINICAL RESEARCH: Primary | ICD-10-CM

## 2022-09-19 PROCEDURE — 99207 PR NO CHARGE NURSE ONLY: CPT

## 2022-09-22 ENCOUNTER — ALLIED HEALTH/NURSE VISIT (OUTPATIENT)
Dept: CARDIOLOGY | Facility: CLINIC | Age: 62
End: 2022-09-22
Payer: COMMERCIAL

## 2022-09-22 DIAGNOSIS — Z00.6 EXAMINATION OF PARTICIPANT OR CONTROL IN CLINICAL RESEARCH: Primary | ICD-10-CM

## 2022-09-22 PROCEDURE — 99207 PR NO CHARGE NURSE ONLY: CPT

## 2022-09-22 NOTE — PROGRESS NOTES
[facility] At-Home Study End Note    Study Description:   Multiconditions PPG Sub-Study. The purpose of this research study is to collect data related to health for the development of mobile technologies. This data will include physiological signal recordings from medical devices and smart watch data collection software. This study is not to provide any treatment. This study will only collect information for research and  purposes.     Adverse Events & Con Med Assessment Performed?   [x]     Did the Subject Complete the At-Home Session? Yes    If no, Termination Reason: N/A     Of note, participant forgot one of the iPhones. Plan will be to discuss over the phone once he is home on 9/22 to coordinate a [employer] pick-up for 9/23/22.     Study Termination/Completion Date: 22-SEP-2022    Subject returned devices today and this completes this study for the subject.    Fer [facility]

## 2022-10-07 NOTE — TELEPHONE ENCOUNTER
"[facility] Study At-Home Phone Visit    Study description:   Multiconditions PPG Sub-Study. The purpose of this research study is to collect data related to health for the development of mobile technologies. This data will include physiological signal recordings from medical devices and smart watch data collection software. This study is not to provide any treatment. This study will only collect information for research and  purposes.       Subject Number: [id number]    Study Day: Day 8     [facility]ban study subject was called today to;     Confirm subjects are following the subject instructions     Address any technical difficulties     Answer any questions    Reminders Checklist:   [x]  Unlock the watches: Passcode - [id number]    -When placing them on the  or on themselves   [x]  Visually confirm Wi-Fi connection and charging setup  [x]  Unlock phones   -Unlock before placing on   -Should NOT show lock icon  [x]  Check Flubber calli for incomplete surveys   -Morning surveys: End of Night task on NIGHT phone    -Evening surveys: End of Day task on DAY phone  [x]  Take devices off before showering/getting them wet  [x]  Make sure to dismiss any update notifications. -Tap \"Not Now\"  [x]  Good Nonin wear    -While resting/relaxing, not while typing doing anything hand intensive   [x]  Remind them of next appointment with us     Adverse Events & Con Med Assessment Performed?   [x]  (If yes, please generate adverse event report for PI to cosign)      15-SEP-2022    Steph Cherry      "

## 2022-10-22 ENCOUNTER — HEALTH MAINTENANCE LETTER (OUTPATIENT)
Age: 62
End: 2022-10-22

## 2022-11-04 ENCOUNTER — HOSPITAL ENCOUNTER (OUTPATIENT)
Dept: CARDIOLOGY | Facility: CLINIC | Age: 62
Discharge: HOME | End: 2022-11-04
Attending: FAMILY MEDICINE | Admitting: FAMILY MEDICINE
Payer: COMMERCIAL

## 2022-11-04 DIAGNOSIS — I10 BENIGN ESSENTIAL HYPERTENSION: ICD-10-CM

## 2022-11-04 DIAGNOSIS — I50.20 HEART FAILURE WITH REDUCED EJECTION FRACTION, NYHA CLASS III (H): Primary | ICD-10-CM

## 2022-11-04 DIAGNOSIS — E78.5 HYPERLIPIDEMIA LDL GOAL <100: ICD-10-CM

## 2022-11-04 DIAGNOSIS — I50.20 HEART FAILURE WITH REDUCED EJECTION FRACTION, NYHA CLASS II (H): Primary | ICD-10-CM

## 2022-11-04 LAB — LVEF ECHO: NORMAL

## 2022-11-04 PROCEDURE — 255N000002 HC RX 255 OP 636: Performed by: FAMILY MEDICINE

## 2022-11-04 PROCEDURE — 93306 TTE W/DOPPLER COMPLETE: CPT | Mod: 26 | Performed by: INTERNAL MEDICINE

## 2022-11-04 PROCEDURE — 999N000208 ECHOCARDIOGRAM COMPLETE

## 2022-11-04 RX ORDER — CARVEDILOL 25 MG/1
25 TABLET ORAL 2 TIMES DAILY WITH MEALS
Qty: 180 TABLET | Refills: 0 | Status: SHIPPED | OUTPATIENT
Start: 2022-11-04 | End: 2024-01-19

## 2022-11-04 RX ORDER — ATORVASTATIN CALCIUM 10 MG/1
10 TABLET, FILM COATED ORAL DAILY
Qty: 90 TABLET | Refills: 0 | Status: SHIPPED | OUTPATIENT
Start: 2022-11-04 | End: 2022-12-16

## 2022-11-04 RX ORDER — LISINOPRIL 20 MG/1
20 TABLET ORAL DAILY
Qty: 90 TABLET | Refills: 4 | Status: SHIPPED | OUTPATIENT
Start: 2022-11-04 | End: 2022-12-16

## 2022-11-04 RX ADMIN — HUMAN ALBUMIN MICROSPHERES AND PERFLUTREN 3 ML: 10; .22 INJECTION, SOLUTION INTRAVENOUS at 14:11

## 2022-11-09 ENCOUNTER — OFFICE VISIT (OUTPATIENT)
Dept: CARDIOLOGY | Facility: CLINIC | Age: 62
End: 2022-11-09
Attending: FAMILY MEDICINE
Payer: COMMERCIAL

## 2022-11-09 VITALS
DIASTOLIC BLOOD PRESSURE: 88 MMHG | WEIGHT: 183 LBS | HEIGHT: 67 IN | HEART RATE: 70 BPM | SYSTOLIC BLOOD PRESSURE: 128 MMHG | OXYGEN SATURATION: 98 % | BODY MASS INDEX: 28.72 KG/M2

## 2022-11-09 DIAGNOSIS — I50.20 HEART FAILURE WITH REDUCED EJECTION FRACTION, NYHA CLASS III (H): ICD-10-CM

## 2022-11-09 DIAGNOSIS — I50.20 HEART FAILURE WITH REDUCED EJECTION FRACTION, NYHA CLASS II (H): ICD-10-CM

## 2022-11-09 DIAGNOSIS — I25.5 ISCHEMIC CARDIOMYOPATHY: Primary | ICD-10-CM

## 2022-11-09 PROCEDURE — 99204 OFFICE O/P NEW MOD 45 MIN: CPT | Performed by: INTERNAL MEDICINE

## 2022-11-09 RX ORDER — SPIRONOLACTONE 25 MG/1
25 TABLET ORAL DAILY
Qty: 90 TABLET | Refills: 3 | Status: SHIPPED | OUTPATIENT
Start: 2022-11-09 | End: 2024-01-19

## 2022-11-09 NOTE — LETTER
"11/9/2022    Louisa Hurley MD  [address]  [facility] [address]    RE: Alfa Cm       Dear Colleague,     I had the pleasure of seeing Alfa Cm in the [facility].  Cardiology Consultation      Alfa Cm MRN# [id number]   YOB: 1960 Age: 62 year old   Date of Visit 11/09/2022     Reason for consult:  Abnormal echocardiogram           Assessment and Plan:     1. Well compensated ischemic cardiomyopathy with decrease in LV function on recent echocardiogram    Discussed with patient and will add spironolactone 25 mg daily.  Patient will notify us if he gets any lightheadedness or gynecomastia/tenderness.    Will do stress cardiac MRI looking for ischemia and assessing amount of infarct    We will also have him see electrophysiology per his request regarding ICD.  His last QRS in 2019 was narrow.    Follow-up MARGARET in 3 months.      45 minutes spent today in review of past medical record, discussion with patient and postvisit charting    This note was transcribed using electronic voice recognition software, typographical errors may be present.                Chief Complaint:   New Patient (Heart failure with reduced ejection fraction )           History of Present Illness:   This patient is a very pleasant 62 year old -American male followed for ischemic cardiomyopathy and history of PCI and chronic totally occluded obtuse marginal with previous ejection fraction around 30 to 35% had recent echocardiogram down in the 20-25% LV function range.    He feels well without any dyspnea on exertion or exertional chest discomfort.  No recent infarcts or cardiovascular hospitalizations.  No palpitations.           Physical Exam:     Vitals: /88 (BP Location: Right arm, Patient Position: Sitting, Cuff Size: Adult Large)   Pulse 70   Ht 1.702 m (5' 7\")   Wt 83 kg (183 lb)   SpO2 98%   BMI 28.66 kg/m    Constitutional:  cooperative;well developed overweight  "     Skin:  warm and dry to the touch        Head:  normocephalic        Eyes:  pupils equal and round, conjunctivae and lids unremarkable, sclera white, no xanthalasma, EOMS intact, no nystagmus        ENT:  no pallor or cyanosis        Neck:  JVP normal        Chest:  clear to auscultation;normal respiratory excursion        Cardiac: regular rhythm     no presence of murmur            Abdomen:  BS normoactive        Neurological:  no gross motor deficits;affect appropriate                      Past Medical History:   I have reviewed this patient's past medical history  Past Medical History:   Diagnosis Date     Alcohol abuse      Coronary artery disease involving native coronary artery without angina pectoris 2011    angiplasty, stent [facility] at [facility]     Hyperlipidemia      Hypertension      Myocardial infarction (H)      Substance abuse (H)      Tobacco abuse              Past Surgical History:   I have reviewed this patient's past surgical history  Past Surgical History:   Procedure Laterality Date     COLONOSCOPY N/A 2/1/2022    Procedure: COLONOSCOPY, FLEXIBLE, WITH LESION REMOVAL USING SNARE and biopsy forcep;  Surgeon: Mike Reid MD;  Location:  GI     COLONOSCOPY N/A 2/1/2022    Procedure: COLONOSCOPY, WITH POLYPECTOMies AND BIOPSY;  Surgeon: Mike Reid MD;  Location:  GI     NE PATIENT HAS A CORONARY ARTERY STENT                 Social History:   I have reviewed this patient's social history  Social History     Tobacco Use     Smoking status: Every Day     Packs/day: 0.50     Types: Cigarettes     Smokeless tobacco: Never     Tobacco comments:     quit date 10/16/17   Substance Use Topics     Alcohol use: Not Currently     Alcohol/week: 20.0 standard drinks     Types: 20 Cans of beer per week             Family History:   I have reviewed this patient's family history  Family History   Problem Relation Age of Onset     Diabetes Mother      Colon Cancer No family hx of               Allergies:     Allergies   Allergen Reactions     Pollen Extract              Medications:   I have reviewed this patient's current medications  Current Outpatient Medications   Medication Sig Dispense Refill     albuterol (PROAIR HFA/PROVENTIL HFA/VENTOLIN HFA) 108 (90 BASE) MCG/ACT Inhaler Inhale 1-2 puffs into the lungs every 6 hours as needed for shortness of breath / dyspnea or wheezing 1 Inhaler 5     ammonium lactate (AMLACTIN) 12 % external cream Apply topically 2 times daily 385 g 1     Aspirin 81 MG CAPS Take 81 mg by mouth daily 90 capsule 1     atorvastatin (LIPITOR) 10 MG tablet Take 1 tablet (10 mg) by mouth daily 90 tablet 0     carvedilol (COREG) 25 MG tablet Take 1 tablet (25 mg) by mouth 2 times daily (with meals) 180 tablet 0     fexofenadine (ALLEGRA) 180 MG tablet Take 1 tablet (180 mg) by mouth daily 90 tablet 3     fluticasone (FLONASE) 50 MCG/ACT nasal spray Spray 2 sprays into both nostrils daily 16 g 2     Fluticasone-Umeclidin-Vilanterol (TRELEGY ELLIPTA) 100-62.5-25 MCG/INH oral inhaler Inhale 1 puff into the lungs daily 60 each 3     hydrOXYzine (VISTARIL) 50 MG capsule Take 1-2 capsules orally every 6 hours as needed for anxiety and insomnia. 180 capsule 0     lisinopril (ZESTRIL) 20 MG tablet Take 1 tablet (20 mg) by mouth daily 90 tablet 4     multivitamin, therapeutic (THERA-VIT) TABS tablet Take 1 tablet by mouth daily       QUEtiapine (SEROQUEL) 100 MG tablet Take 1 tablet (100 mg) by mouth At Bedtime 30 tablet 0     spironolactone (ALDACTONE) 25 MG tablet Take 1 tablet (25 mg) by mouth daily 90 tablet 3     triamcinolone (KENALOG) 0.1 % external cream Every other day on affected area for 2 weeks . 45 g 0     order for DME Equipment being ordered: Other: Nebulizer machine  Treatment Diagnosis: Pneumonia, reactive airway 1 Device 0               Review of Systems:       Review of Systems:  Skin:  Positive for     Eyes:  Positive for glasses  ENT:  Negative     Respiratory:  Positive for sleep apnea  Cardiovascular:  Negative    Gastroenterology: Negative    Genitourinary:  Negative    Musculoskeletal:  Negative    Neurologic:  Negative    Psychiatric:  Negative    Heme/Lymph/Imm:  Negative    Endocrine:  Negative                       Data:   All available laboratory data reviewed  Lab Results   Component Value Date    CHOL 148 11/08/2021    CHOL 155 10/31/2017     Lab Results   Component Value Date    HDL 32 11/08/2021    HDL 47 10/31/2017     Lab Results   Component Value Date     11/08/2021    LDL 87 10/31/2017     Lab Results   Component Value Date    TRIG 74 11/08/2021    TRIG 105 10/31/2017     No results found for: CHOLHDLRATIO  TSH   Date Value Ref Range Status   05/31/2022 0.74 0.40 - 4.00 mU/L Final     Last Basic Metabolic Panel:  Lab Results   Component Value Date     05/31/2022     03/04/2019      Lab Results   Component Value Date    POTASSIUM 4.0 05/31/2022    POTASSIUM 3.6 03/04/2019     Lab Results   Component Value Date    CHLORIDE 107 05/31/2022    CHLORIDE 103 03/04/2019     Lab Results   Component Value Date    GRANT 9.7 05/31/2022    GRANT 8.7 03/04/2019     Lab Results   Component Value Date    CO2 27 05/31/2022    CO2 23 03/04/2019     Lab Results   Component Value Date    BUN 9 05/31/2022    BUN 5 03/04/2019     Lab Results   Component Value Date    CR 0.85 05/31/2022    CR 0.70 03/04/2019     Lab Results   Component Value Date     05/31/2022     03/04/2019     Lab Results   Component Value Date    WBC 6.5 11/08/2021    WBC 5.4 03/04/2019     Lab Results   Component Value Date    RBC 4.67 11/08/2021    RBC 4.06 03/04/2019     Lab Results   Component Value Date    HGB 12.7 11/08/2021    HGB 13.7 03/04/2019     Lab Results   Component Value Date    HCT 38.8 11/08/2021    HCT 40.2 03/04/2019     Lab Results   Component Value Date    MCV 83 11/08/2021    MCV 99 03/04/2019     Lab Results   Component Value Date    MCH  27.2 11/08/2021    MCH 33.7 03/04/2019     Lab Results   Component Value Date    MCHC 32.7 11/08/2021    MCHC 34.1 03/04/2019     Lab Results   Component Value Date    RDW 16.7 11/08/2021    RDW 15.2 03/04/2019     Lab Results   Component Value Date     11/08/2021     03/04/2019           Thank you for allowing me to participate in the care of your patient.      Sincerely,     Vicente Reaves MD     [facility]  cc:   Louisa Hurley MD  [address] [place] [address]

## 2022-11-09 NOTE — PATIENT INSTRUCTIONS
Will start a small dose of a medication called SPIRONOLACTONE. If you get chest tenderness or lightheadedness, then stop the medication and give me a call or send me a message.    We can do a stress MRI test to look a heart blood flow and see what permanent old damage versus new reversible weakness is there.    Will set you up to see our electrical doctors about the defibrillator (ICD).

## 2022-11-09 NOTE — PROGRESS NOTES
"Cardiology Consultation      Alfa mC MRN# [id number]   YOB: 1960 Age: 62 year old   Date of Visit 11/09/2022     Reason for consult:  Abnormal echocardiogram           Assessment and Plan:     1. Well compensated ischemic cardiomyopathy with decrease in LV function on recent echocardiogram    Discussed with patient and will add spironolactone 25 mg daily.  Patient will notify us if he gets any lightheadedness or gynecomastia/tenderness.    Will do stress cardiac MRI looking for ischemia and assessing amount of infarct    We will also have him see electrophysiology per his request regarding ICD.  His last QRS in 2019 was narrow.    Follow-up MARGARET in 3 months.      45 minutes spent today in review of past medical record, discussion with patient and postvisit charting    This note was transcribed using electronic voice recognition software, typographical errors may be present.                Chief Complaint:   New Patient (Heart failure with reduced ejection fraction )           History of Present Illness:   This patient is a very pleasant 62 year old -American male followed for ischemic cardiomyopathy and history of PCI and chronic totally occluded obtuse marginal with previous ejection fraction around 30 to 35% had recent echocardiogram down in the 20-25% LV function range.    He feels well without any dyspnea on exertion or exertional chest discomfort.  No recent infarcts or cardiovascular hospitalizations.  No palpitations.           Physical Exam:     Vitals: /88 (BP Location: Right arm, Patient Position: Sitting, Cuff Size: Adult Large)   Pulse 70   Ht 1.702 m (5' 7\")   Wt 83 kg (183 lb)   SpO2 98%   BMI 28.66 kg/m    Constitutional:  cooperative;well developed overweight      Skin:  warm and dry to the touch        Head:  normocephalic        Eyes:  pupils equal and round, conjunctivae and lids unremarkable, sclera white, no xanthalasma, EOMS intact, no nystagmus    "     ENT:  no pallor or cyanosis        Neck:  JVP normal        Chest:  clear to auscultation;normal respiratory excursion        Cardiac: regular rhythm     no presence of murmur            Abdomen:  BS normoactive        Neurological:  no gross motor deficits;affect appropriate                      Past Medical History:   I have reviewed this patient's past medical history  Past Medical History:   Diagnosis Date     Alcohol abuse      Coronary artery disease involving native coronary artery without angina pectoris 2011    angiplasty, stent placed at [facility]     Hyperlipidemia      Hypertension      Myocardial infarction (H)      Substance abuse (H)      Tobacco abuse              Past Surgical History:   I have reviewed this patient's past surgical history  Past Surgical History:   Procedure Laterality Date     COLONOSCOPY N/A 2/1/2022    Procedure: COLONOSCOPY, FLEXIBLE, WITH LESION REMOVAL USING SNARE and biopsy forcep;  Surgeon: Mike Reid MD;  Location:  GI     COLONOSCOPY N/A 2/1/2022    Procedure: COLONOSCOPY, WITH POLYPECTOMies AND BIOPSY;  Surgeon: Mike Reid MD;  Location:  GI     ME PATIENT HAS A CORONARY ARTERY STENT                 Social History:   I have reviewed this patient's social history  Social History     Tobacco Use     Smoking status: Every Day     Packs/day: 0.50     Types: Cigarettes     Smokeless tobacco: Never     Tobacco comments:     quit date 10/16/17   Substance Use Topics     Alcohol use: Not Currently     Alcohol/week: 20.0 standard drinks     Types: 20 Cans of beer per week             Family History:   I have reviewed this patient's family history  Family History   Problem Relation Age of Onset     Diabetes Mother      Colon Cancer No family hx of              Allergies:     Allergies   Allergen Reactions     Pollen Extract              Medications:   I have reviewed this patient's current medications  Current Outpatient Medications    Medication Sig Dispense Refill     albuterol (PROAIR HFA/PROVENTIL HFA/VENTOLIN HFA) 108 (90 BASE) MCG/ACT Inhaler Inhale 1-2 puffs into the lungs every 6 hours as needed for shortness of breath / dyspnea or wheezing 1 Inhaler 5     ammonium lactate (AMLACTIN) 12 % external cream Apply topically 2 times daily 385 g 1     Aspirin 81 MG CAPS Take 81 mg by mouth daily 90 capsule 1     atorvastatin (LIPITOR) 10 MG tablet Take 1 tablet (10 mg) by mouth daily 90 tablet 0     carvedilol (COREG) 25 MG tablet Take 1 tablet (25 mg) by mouth 2 times daily (with meals) 180 tablet 0     fexofenadine (ALLEGRA) 180 MG tablet Take 1 tablet (180 mg) by mouth daily 90 tablet 3     fluticasone (FLONASE) 50 MCG/ACT nasal spray Spray 2 sprays into both nostrils daily 16 g 2     Fluticasone-Umeclidin-Vilanterol (TRELEGY ELLIPTA) 100-62.5-25 MCG/INH oral inhaler Inhale 1 puff into the lungs daily 60 each 3     hydrOXYzine (VISTARIL) 50 MG capsule Take 1-2 capsules orally every 6 hours as needed for anxiety and insomnia. 180 capsule 0     lisinopril (ZESTRIL) 20 MG tablet Take 1 tablet (20 mg) by mouth daily 90 tablet 4     multivitamin, therapeutic (THERA-VIT) TABS tablet Take 1 tablet by mouth daily       QUEtiapine (SEROQUEL) 100 MG tablet Take 1 tablet (100 mg) by mouth At Bedtime 30 tablet 0     spironolactone (ALDACTONE) 25 MG tablet Take 1 tablet (25 mg) by mouth daily 90 tablet 3     triamcinolone (KENALOG) 0.1 % external cream Every other day on affected area for 2 weeks . 45 g 0     order for DME Equipment being ordered: Other: Nebulizer machine  Treatment Diagnosis: Pneumonia, reactive airway 1 Device 0               Review of Systems:       Review of Systems:  Skin:  Positive for     Eyes:  Positive for glasses  ENT:  Negative    Respiratory:  Positive for sleep apnea  Cardiovascular:  Negative    Gastroenterology: Negative    Genitourinary:  Negative    Musculoskeletal:  Negative    Neurologic:  Negative    Psychiatric:   Negative    Heme/Lymph/Imm:  Negative    Endocrine:  Negative                       Data:   All available laboratory data reviewed  Lab Results   Component Value Date    CHOL 148 11/08/2021    CHOL 155 10/31/2017     Lab Results   Component Value Date    HDL 32 11/08/2021    HDL 47 10/31/2017     Lab Results   Component Value Date     11/08/2021    LDL 87 10/31/2017     Lab Results   Component Value Date    TRIG 74 11/08/2021    TRIG 105 10/31/2017     No results found for: CHOLHDLRATIO  TSH   Date Value Ref Range Status   05/31/2022 0.74 0.40 - 4.00 mU/L Final     Last Basic Metabolic Panel:  Lab Results   Component Value Date     05/31/2022     03/04/2019      Lab Results   Component Value Date    POTASSIUM 4.0 05/31/2022    POTASSIUM 3.6 03/04/2019     Lab Results   Component Value Date    CHLORIDE 107 05/31/2022    CHLORIDE 103 03/04/2019     Lab Results   Component Value Date    GRANT 9.7 05/31/2022    GRANT 8.7 03/04/2019     Lab Results   Component Value Date    CO2 27 05/31/2022    CO2 23 03/04/2019     Lab Results   Component Value Date    BUN 9 05/31/2022    BUN 5 03/04/2019     Lab Results   Component Value Date    CR 0.85 05/31/2022    CR 0.70 03/04/2019     Lab Results   Component Value Date     05/31/2022     03/04/2019     Lab Results   Component Value Date    WBC 6.5 11/08/2021    WBC 5.4 03/04/2019     Lab Results   Component Value Date    RBC 4.67 11/08/2021    RBC 4.06 03/04/2019     Lab Results   Component Value Date    HGB 12.7 11/08/2021    HGB 13.7 03/04/2019     Lab Results   Component Value Date    HCT 38.8 11/08/2021    HCT 40.2 03/04/2019     Lab Results   Component Value Date    MCV 83 11/08/2021    MCV 99 03/04/2019     Lab Results   Component Value Date    MCH 27.2 11/08/2021    MCH 33.7 03/04/2019     Lab Results   Component Value Date    MCHC 32.7 11/08/2021    MCHC 34.1 03/04/2019     Lab Results   Component Value Date    RDW 16.7 11/08/2021    RDW 15.2  03/04/2019     Lab Results   Component Value Date     11/08/2021     03/04/2019

## 2022-12-07 DIAGNOSIS — G47.00 PERSISTENT INSOMNIA: ICD-10-CM

## 2022-12-07 DIAGNOSIS — F41.9 CHRONIC ANXIETY: ICD-10-CM

## 2022-12-07 RX ORDER — QUETIAPINE FUMARATE 100 MG/1
100 TABLET, FILM COATED ORAL AT BEDTIME
Qty: 30 TABLET | Refills: 0 | Status: SHIPPED | OUTPATIENT
Start: 2022-12-07 | End: 2022-12-16

## 2022-12-07 NOTE — TELEPHONE ENCOUNTER
Routing refill request to provider for review/approval because:  Needs lipid and CBC    Lianne Sanchez RN

## 2022-12-11 ENCOUNTER — HOSPITAL ENCOUNTER (EMERGENCY)
Facility: CLINIC | Age: 62
Discharge: HOME | End: 2022-12-11
Attending: EMERGENCY MEDICINE | Admitting: EMERGENCY MEDICINE
Payer: COMMERCIAL

## 2022-12-11 ENCOUNTER — APPOINTMENT (OUTPATIENT)
Dept: CT IMAGING | Facility: CLINIC | Age: 62
End: 2022-12-11
Attending: EMERGENCY MEDICINE
Payer: COMMERCIAL

## 2022-12-11 VITALS
HEART RATE: 75 BPM | TEMPERATURE: 98.4 F | SYSTOLIC BLOOD PRESSURE: 133 MMHG | BODY MASS INDEX: 27.38 KG/M2 | WEIGHT: 174.82 LBS | RESPIRATION RATE: 20 BRPM | OXYGEN SATURATION: 99 % | DIASTOLIC BLOOD PRESSURE: 84 MMHG

## 2022-12-11 DIAGNOSIS — R31.0 GROSS HEMATURIA: ICD-10-CM

## 2022-12-11 DIAGNOSIS — K74.60 HEPATIC CIRRHOSIS, UNSPECIFIED HEPATIC CIRRHOSIS TYPE, UNSPECIFIED WHETHER ASCITES PRESENT (H): ICD-10-CM

## 2022-12-11 DIAGNOSIS — K80.20 CALCULUS OF GALLBLADDER WITHOUT CHOLECYSTITIS WITHOUT OBSTRUCTION: ICD-10-CM

## 2022-12-11 LAB
ALBUMIN UR-MCNC: 300 MG/DL
ANION GAP SERPL CALCULATED.3IONS-SCNC: 11 MMOL/L (ref 7–15)
APPEARANCE UR: ABNORMAL
BASOPHILS # BLD AUTO: 0 10E3/UL (ref 0–0.2)
BASOPHILS NFR BLD AUTO: 0 %
BILIRUB UR QL STRIP: NEGATIVE
BUN SERPL-MCNC: 14.6 MG/DL (ref 8–23)
CALCIUM SERPL-MCNC: 9 MG/DL (ref 8.8–10.2)
CHLORIDE SERPL-SCNC: 98 MMOL/L (ref 98–107)
COLOR UR AUTO: ABNORMAL
CREAT SERPL-MCNC: 0.88 MG/DL (ref 0.67–1.17)
DEPRECATED HCO3 PLAS-SCNC: 24 MMOL/L (ref 22–29)
EOSINOPHIL # BLD AUTO: 0 10E3/UL (ref 0–0.7)
EOSINOPHIL NFR BLD AUTO: 0 %
ERYTHROCYTE [DISTWIDTH] IN BLOOD BY AUTOMATED COUNT: 14.3 % (ref 10–15)
GFR SERPL CREATININE-BSD FRML MDRD: >90 ML/MIN/1.73M2
GLUCOSE SERPL-MCNC: 100 MG/DL (ref 70–99)
GLUCOSE UR STRIP-MCNC: NEGATIVE MG/DL
HCT VFR BLD AUTO: 43 % (ref 40–53)
HGB BLD-MCNC: 13.8 G/DL (ref 13.3–17.7)
HGB UR QL STRIP: ABNORMAL
IMM GRANULOCYTES # BLD: 0 10E3/UL
IMM GRANULOCYTES NFR BLD: 0 %
INR PPP: 1.08 (ref 0.85–1.15)
KETONES UR STRIP-MCNC: NEGATIVE MG/DL
LEUKOCYTE ESTERASE UR QL STRIP: ABNORMAL
LYMPHOCYTES # BLD AUTO: 1.7 10E3/UL (ref 0.8–5.3)
LYMPHOCYTES NFR BLD AUTO: 15 %
MCH RBC QN AUTO: 31.9 PG (ref 26.5–33)
MCHC RBC AUTO-ENTMCNC: 32.1 G/DL (ref 31.5–36.5)
MCV RBC AUTO: 100 FL (ref 78–100)
MONOCYTES # BLD AUTO: 1.4 10E3/UL (ref 0–1.3)
MONOCYTES NFR BLD AUTO: 13 %
MUCOUS THREADS #/AREA URNS LPF: PRESENT /LPF
NEUTROPHILS # BLD AUTO: 8.2 10E3/UL (ref 1.6–8.3)
NEUTROPHILS NFR BLD AUTO: 72 %
NITRATE UR QL: NEGATIVE
NRBC # BLD AUTO: 0 10E3/UL
NRBC BLD AUTO-RTO: 0 /100
PH UR STRIP: 6.5 [PH] (ref 5–7)
PLATELET # BLD AUTO: 160 10E3/UL (ref 150–450)
POTASSIUM SERPL-SCNC: 4.1 MMOL/L (ref 3.4–5.3)
RBC # BLD AUTO: 4.32 10E6/UL (ref 4.4–5.9)
RBC URINE: >182 /HPF
SODIUM SERPL-SCNC: 133 MMOL/L (ref 136–145)
SP GR UR STRIP: 1.03 (ref 1–1.03)
UROBILINOGEN UR STRIP-MCNC: 6 MG/DL
WBC # BLD AUTO: 11.3 10E3/UL (ref 4–11)
WBC URINE: >182 /HPF

## 2022-12-11 PROCEDURE — 250N000009 HC RX 250: Performed by: EMERGENCY MEDICINE

## 2022-12-11 PROCEDURE — 36415 COLL VENOUS BLD VENIPUNCTURE: CPT | Performed by: EMERGENCY MEDICINE

## 2022-12-11 PROCEDURE — 85025 COMPLETE CBC W/AUTO DIFF WBC: CPT | Performed by: EMERGENCY MEDICINE

## 2022-12-11 PROCEDURE — 81001 URINALYSIS AUTO W/SCOPE: CPT | Performed by: EMERGENCY MEDICINE

## 2022-12-11 PROCEDURE — 74177 CT ABD & PELVIS W/CONTRAST: CPT

## 2022-12-11 PROCEDURE — 85610 PROTHROMBIN TIME: CPT | Performed by: EMERGENCY MEDICINE

## 2022-12-11 PROCEDURE — 99285 EMERGENCY DEPT VISIT HI MDM: CPT | Mod: 25

## 2022-12-11 PROCEDURE — 250N000011 HC RX IP 250 OP 636: Performed by: EMERGENCY MEDICINE

## 2022-12-11 PROCEDURE — 80048 BASIC METABOLIC PNL TOTAL CA: CPT | Performed by: EMERGENCY MEDICINE

## 2022-12-11 PROCEDURE — 96365 THER/PROPH/DIAG IV INF INIT: CPT | Mod: 59

## 2022-12-11 PROCEDURE — 87086 URINE CULTURE/COLONY COUNT: CPT | Performed by: EMERGENCY MEDICINE

## 2022-12-11 RX ORDER — CEPHALEXIN 500 MG/1
500 CAPSULE ORAL 3 TIMES DAILY
Qty: 21 CAPSULE | Refills: 0 | Status: SHIPPED | OUTPATIENT
Start: 2022-12-11 | End: 2022-12-19

## 2022-12-11 RX ORDER — CEFTRIAXONE 1 G/1
1 INJECTION, POWDER, FOR SOLUTION INTRAMUSCULAR; INTRAVENOUS ONCE
Status: COMPLETED | OUTPATIENT
Start: 2022-12-11 | End: 2022-12-11

## 2022-12-11 RX ORDER — IOPAMIDOL 755 MG/ML
500 INJECTION, SOLUTION INTRAVASCULAR ONCE
Status: COMPLETED | OUTPATIENT
Start: 2022-12-11 | End: 2022-12-11

## 2022-12-11 RX ADMIN — SODIUM CHLORIDE 60 ML: 9 INJECTION, SOLUTION INTRAVENOUS at 17:54

## 2022-12-11 RX ADMIN — CEFTRIAXONE 1 G: 1 INJECTION, POWDER, FOR SOLUTION INTRAMUSCULAR; INTRAVENOUS at 18:11

## 2022-12-11 RX ADMIN — IOPAMIDOL 85 ML: 755 INJECTION, SOLUTION INTRAVENOUS at 17:54

## 2022-12-11 ASSESSMENT — ENCOUNTER SYMPTOMS
BACK PAIN: 0
ABDOMINAL PAIN: 1
DYSURIA: 1
FEVER: 0
HEMATURIA: 1

## 2022-12-11 ASSESSMENT — ACTIVITIES OF DAILY LIVING (ADL)
ADLS_ACUITY_SCORE: 37
ADLS_ACUITY_SCORE: 35

## 2022-12-11 NOTE — ED TRIAGE NOTES
A&O x4, ABCs intact. Pt presents with hematuria. Pt states that this started a few days ago. He denies pain. He denies taking blood thinners.        Triage Assessment     Row Name 12/11/22 1454       Triage Assessment (Adult)    Airway WDL WDL       Respiratory WDL    Respiratory WDL WDL       Cardiac WDL    Cardiac WDL WDL

## 2022-12-11 NOTE — ED PROVIDER NOTES
"  History   Chief Complaint:  Hematuria       The history is provided by the patient.      Alfa Cm is a 62 year old male with history of hypertension and CAD who presents with hematuria. The patient reports that 2 days ago, he developed hematuria that ws \"brown and bloody.\" He mentions starting Spironolactone for his heart about 2 weeks ago. Patient endorses dysuria, urgency and abdominal discomfort. Patient denies back pain, fever and blood clots in urine.  Denies unintentional weight loss.  No prior history of malignancy.  On aspirin though no other anticoagulation.  No history of underlying coagulopathy or platelet dysfunction.  No history of easy bruising.    Review of Systems   Constitutional: Negative for fever.   Gastrointestinal: Positive for abdominal pain.   Genitourinary: Positive for dysuria, hematuria and urgency.   Musculoskeletal: Negative for back pain.   All other systems reviewed and are negative.      Allergies:  Pollen Extract    Medications:  Lipitor  Coreg  Allegra  Vistaril  Zestril  Seroquel  Aldactone    Past Medical History:     ST elevation myocardial infarction involving right coronary artery  Hyperlipidemia  Benign essential hypertension  Coronary artery disease involving native coronary artery  Tobacco dependence syndrome  STEMI  Sepsis   Coronary artery disease involving native coronary artery without angina pectoris  Iron deficiency anemia, unspecified iron deficiency anemia type  Anemia, unspecified type  Chronic combined systolic and diastolic congestive heart failure  Alcohol dependence with alcohol-induced sleep disorder  Persistent insomnia     Past Surgical History:    Colonoscopy  Coronary artery stent placement      Family History:    Mother: Diabetes     Social History:  Patient accompanied by sisters.  PCP: Louisa Hurley     Physical Exam     Patient Vitals for the past 24 hrs:   BP Temp Temp src Pulse Resp SpO2 Weight   12/11/22 1945 133/84 -- -- 75 -- 99 % -- "   12/11/22 1453 105/66 98.4  F (36.9  C) Temporal 82 20 99 % 79.3 kg (174 lb 13.2 oz)       Physical Exam  General:              Well-nourished              Speaking in full sentences  Eyes:              Conjunctiva without injection or scleral icterus  ENT:              Moist mucous membranes              Nares patent              Pinnae normal  Neck:              Full ROM              No stiffness appreciated  Resp:              Lungs CTAB              No crackles, wheezing or audible rubs              Good air movement  CV:                    Normal rate, regular rhythm              S1 and S2 present              No murmur, gallop or rub  GI:              BS present              Abdomen soft without distention              Tenderness to palpation to periumbilical region and just left of umbilicus              No guarding or rebound tenderness  Skin:              Warm, dry, well perfused              No rashes or open wounds on exposed skin  MSK:              Moves all extremities              No focal deformities or swelling  Neuro:              Alert              Answers questions appropriately              Moves all extremities equally              Gait stable  Psych:              Normal affect, normal mood    Emergency Department Course     Imaging:  CT Abdomen Pelvis w Contrast   Final Result   IMPRESSION:    1.  Prominent diffuse bladder wall thickening likely inflammatory in nature though diffuse tumor infiltration also possible.   2.  Abundant localized stool within rectum, bowel otherwise unremarkable.   3.  Tiny umbilical hernia contains a very short loop of nonobstructed small bowel.   4.  Cholelithiasis. Question early changes of cirrhosis.        Report per radiology    Laboratory:  Labs Ordered and Resulted from Time of ED Arrival to Time of ED Departure   ROUTINE UA WITH MICROSCOPIC REFLEX TO CULTURE - Abnormal       Result Value    Color Urine Dark Red (*)     Appearance Urine Cloudy (*)      Glucose Urine Negative      Bilirubin Urine Negative      Ketones Urine Negative      Specific Gravity Urine 1.026      Blood Urine Large (*)     pH Urine 6.5      Protein Albumin Urine 300 (*)     Urobilinogen Urine 6.0 (*)     Nitrite Urine Negative      Leukocyte Esterase Urine Moderate (*)     Mucus Urine Present (*)     RBC Urine >182 (*)     WBC Urine >182 (*)    BASIC METABOLIC PANEL - Abnormal    Sodium 133 (*)     Potassium 4.1      Chloride 98      Carbon Dioxide (CO2) 24      Anion Gap 11      Urea Nitrogen 14.6      Creatinine 0.88      Calcium 9.0      Glucose 100 (*)     GFR Estimate >90     CBC WITH PLATELETS AND DIFFERENTIAL - Abnormal    WBC Count 11.3 (*)     RBC Count 4.32 (*)     Hemoglobin 13.8      Hematocrit 43.0            MCH 31.9      MCHC 32.1      RDW 14.3      Platelet Count 160      % Neutrophils 72      % Lymphocytes 15      % Monocytes 13      % Eosinophils 0      % Basophils 0      % Immature Granulocytes 0      NRBCs per 100 WBC 0      Absolute Neutrophils 8.2      Absolute Lymphocytes 1.7      Absolute Monocytes 1.4 (*)     Absolute Eosinophils 0.0      Absolute Basophils 0.0      Absolute Immature Granulocytes 0.0      Absolute NRBCs 0.0     INR - Normal    INR 1.08     URINE CULTURE        Emergency Department Course:     Reviewed:  I reviewed nursing notes, vitals, past medical history and Care Everywhere    Assessments:  1655 I obtained history and examined the patient as noted above.   1745 I rechecked the patient and explained findings.     Interventions:  Medications   cefTRIAXone (ROCEPHIN) 1 g vial to attach to  mL bag for ADULTS or NS 50 mL bag for PEDS (0 g Intravenous Stopped 12/11/22 1926)   sodium chloride for CT scan flush use (60 mLs Intravenous Given 12/11/22 1754)   iopamidol (ISOVUE-370) solution 500 mL (85 mLs Intravenous Given 12/11/22 1754)       Disposition:  The patient was discharged to home.     Impression & Plan     Medical Decision  Making:  Alfa Cm is a 62-year-old male with a PMH significant for known CAD, who presents to the ED accompanied by spouse and family member for evaluation of hematuria.  VS on presentation reveal no acute abnormalities.  Broad differential diagnosis considered regarding patient's presenting symptoms including though not limited to, hemorrhagic cystitis, bladder malignancy, coagulopathy, trauma, among others.  Urinalysis as noted above, reveals findings concerning for hematuria as well as possible superimposed infection.  Culture will be added, and patient provided 1 dose of Rocephin.  We will plan 7-day course of Keflex.  Patient on aspirin, but no other anticoagulation.  Labs reveal normal hemoglobin and platelet count.  Renal function presently within normal limits.  Given concurrent abdominal discomfort, advanced imaging was pursued.  This demonstrates prominent diffuse bladder wall thickening, that may be inflammatory in nature, though cannot exclude underlying tumor with infiltration.  This finding was discussed with the patient, and the importance of close outpatient follow-up with urology was stressed.  An order was placed through EMR to assist with arranging close follow-up.  Patient and spouse verbalized understanding of this.  Incidental findings were also discussed including cholelithiasis, findings of early liver cirrhosis, atherosclerotic disease, and small umbilical hernia which currently shows no signs of obstruction.  Patient is without obstructive symptoms as well including absence of vomiting, obstipation or constipation.  I did stressed the importance of close follow-up with PCP regarding this to ensure appropriate risk factor modification and discussed alcohol cessation as able.  Patient denies obstructive urinary symptoms and continues to be able to void fully.  Anticipate discharge home, though presently awaiting results of postvoid residual bladder scan which will be followed up in my  partner, Dr. Lackey, on the evening shift.  Plan of care discussed with patient and family at bedside.  All comfortable with outlined plan of care.  Return to ED with increased bleeding, inability to urinate, increasing abdominal pain, fevers, vomiting, or any other concerns.  All questions answered prior to discharge.      Diagnosis:    ICD-10-CM    1. Gross hematuria  R31.0 Adult Urology  Referral      2. Hepatic cirrhosis, unspecified hepatic cirrhosis type, unspecified whether ascites present (H)  K74.60       3. Calculus of gallbladder without cholecystitis without obstruction  K80.20           Discharge Medications:  Discharge Medication List as of 12/11/2022  7:42 PM      START taking these medications    Details   cephALEXin (KEFLEX) 500 MG capsule Take 1 capsule (500 mg) by mouth 3 times daily for 7 days, Disp-21 capsule, R-0, E-Prescribe             Scribe Disclosure:  I, Kari Pearce, am serving as a scribe at 5:06 PM on 12/11/2022 to document services personally performed by Juan Luis Dalton MD based on my observations and the provider's statements to me.            Juan Luis Dalton MD  12/12/22 0967

## 2022-12-11 NOTE — ED NOTES
"PIT/Triage Evaluation    Patient presented with hematuria occurring since 2 days ago with notably more blood today. He denies any recent trauma. This has not happened before, and the patient has not had any issues with kidney or bladder infections in the past. He notes that there is a stinging sensation while urinating but denies any genuine pain. He also endorses some flank pain on the left side. He denies any nausea or vomiting. He denies any lesions or cuts on his penis. He notes urinary urgency.    The patient takes Aspirin once a day. He is not anticoagulated otherwise.     While he denies any family history of hematuria, he notes that his nephew has had some \"bleeding issues\", but the patient is unsure of what was specifically the issue.    Exam is notable for     No penile lesions noted  No penile tenderness  Dried bloody urine in underwear.    Appropriate interventions for symptom management were initiated if applicable.  Appropriate diagnostic tests were initiated if indicated.    Important information for subsequent clinician:    DDx: UTI, neoplasm, platelet dysfunction (felt less likely given lack of bruising), etc.    I briefly evaluated the patient and developed an initial plan of care. I discussed this plan and explained that this brief interaction does not constitute a full evaluation. Patient/family understands that they should wait to be fully evaluated and discuss any test results with another clinician prior to leaving the hospital.       Reyes Rossi, DO  12/11/22 3965    "

## 2022-12-12 LAB — BACTERIA UR CULT: ABNORMAL

## 2022-12-12 NOTE — DISCHARGE INSTRUCTIONS
Please begin the antibiotic as discussed for possible urinary tract infection.    Follow-up with urology for further evaluation of the blood in your urine.  They may need to do further imaging of your bladder to ensure underlying tumor or mass causing your blood.    Return to the ER if you develop any difficulties urinating or increasing abdominal pain.    Follow-up with your primary care provider to discuss the incidental findings we discussed today including scarring on your liver, as well as plaque in your blood vessels.

## 2022-12-13 ENCOUNTER — TELEPHONE (OUTPATIENT)
Dept: FAMILY MEDICINE | Facility: CLINIC | Age: 62
End: 2022-12-13

## 2022-12-13 NOTE — TELEPHONE ENCOUNTER
Dr. Hurley would like to see patient for ER follow up. I called and left message to call back to schedule follow up with her. As of now there was a virtual visit available on 12/16 that can be an in person visit per Dr. Hurley.  Kayleen Naidu,

## 2022-12-13 NOTE — RESULT ENCOUNTER NOTE
Final Urine Culture Report on 12/12/22  [facility] Emergency Dept discharge antibiotic prescribed: Cephalexin (Keflex) 500 mg capsule, 1 capsule (500 mg) by mouth 3 times daily for 7 days.  #1. Bacteria, >100,000 CFU/ML Escherichia coli , is SUSCEPTIBLE to Antibiotic.    No change in treatment per [facility] ED lab result Urine Culture protocol.

## 2022-12-14 ENCOUNTER — TELEPHONE (OUTPATIENT)
Dept: EMERGENCY MEDICINE | Facility: CLINIC | Age: 62
End: 2022-12-14

## 2022-12-14 NOTE — TELEPHONE ENCOUNTER
"[facility] Emergency Department/Urgent Care Lab result notification [Positive for uti and bacteria is susceptible to antibiotic]:    Reason for call:   Notify of Final urine culture result, confirm patient is taking antibiotic, assess symptoms, and advise per Emergency Dept/Urgent Care discharge instructions and Emergency Dept urine culture protocol.    Lab Result & Date of Final Report [copied from Result Note]:    Final Urine Culture Report on 12/12/22  [facility] Emergency Dept discharge antibiotic prescribed: Cephalexin (Keflex) 500 mg capsule, 1 capsule (500 mg) by mouth 3 times daily for 7 days.  #1. Bacteria, >100,000 CFU/ML Escherichia coli , is SUSCEPTIBLE to Antibiotic.    No change in treatment per [facility] ED lab result Urine Culture protocol.    Current symptoms (include time patient called):    4PM: Patient states he is doing well. \"It's cleared up.\"     Recommendations/Instructions:   Patient notified of lab result and treatment recommendation.   Take antibiotic as directed by the Emergency Dept/Urgent Care Provider.  Advised to keep his follow up with his PCP which is scheduled in 2 days, return to ED with any worsening symptoms.   The patient is comfortable with the information given and has no further questions.     Please contact you PCP or return to the Emergency Department if your:    Symptoms return      Layne Porter RN  [facility]  Emergency Dept Lab Result RN  Ph# [phone]           "

## 2022-12-16 ENCOUNTER — OFFICE VISIT (OUTPATIENT)
Dept: FAMILY MEDICINE | Facility: CLINIC | Age: 62
End: 2022-12-16
Payer: COMMERCIAL

## 2022-12-16 VITALS
OXYGEN SATURATION: 96 % | RESPIRATION RATE: 16 BRPM | SYSTOLIC BLOOD PRESSURE: 134 MMHG | DIASTOLIC BLOOD PRESSURE: 84 MMHG | BODY MASS INDEX: 26.13 KG/M2 | TEMPERATURE: 97.2 F | HEART RATE: 56 BPM | WEIGHT: 172.4 LBS | HEIGHT: 68 IN

## 2022-12-16 DIAGNOSIS — I50.20 HEART FAILURE WITH REDUCED EJECTION FRACTION, NYHA CLASS III (H): ICD-10-CM

## 2022-12-16 DIAGNOSIS — G47.00 PERSISTENT INSOMNIA: ICD-10-CM

## 2022-12-16 DIAGNOSIS — I10 BENIGN ESSENTIAL HYPERTENSION: ICD-10-CM

## 2022-12-16 DIAGNOSIS — F10.20 ALCOHOLISM (H): ICD-10-CM

## 2022-12-16 DIAGNOSIS — I25.10 CORONARY ARTERY DISEASE INVOLVING NATIVE CORONARY ARTERY OF NATIVE HEART WITHOUT ANGINA PECTORIS: ICD-10-CM

## 2022-12-16 DIAGNOSIS — Z87.891 PERSONAL HISTORY OF NICOTINE DEPENDENCE: ICD-10-CM

## 2022-12-16 DIAGNOSIS — E78.5 HYPERLIPIDEMIA, UNSPECIFIED HYPERLIPIDEMIA TYPE: ICD-10-CM

## 2022-12-16 DIAGNOSIS — R30.0 DYSURIA: Primary | ICD-10-CM

## 2022-12-16 DIAGNOSIS — Z12.2 SCREENING FOR LUNG CANCER: ICD-10-CM

## 2022-12-16 DIAGNOSIS — R31.9 HEMATURIA, UNSPECIFIED TYPE: ICD-10-CM

## 2022-12-16 DIAGNOSIS — J44.9 CHRONIC OBSTRUCTIVE PULMONARY DISEASE, UNSPECIFIED COPD TYPE (H): ICD-10-CM

## 2022-12-16 DIAGNOSIS — F41.9 CHRONIC ANXIETY: ICD-10-CM

## 2022-12-16 PROBLEM — E66.01 MORBID OBESITY (H): Status: ACTIVE | Noted: 2022-12-16

## 2022-12-16 LAB
ALBUMIN SERPL BCG-MCNC: 3.7 G/DL (ref 3.5–5.2)
ALBUMIN UR-MCNC: NEGATIVE MG/DL
ALP SERPL-CCNC: 134 U/L (ref 40–129)
ALT SERPL W P-5'-P-CCNC: 13 U/L (ref 10–50)
ANION GAP SERPL CALCULATED.3IONS-SCNC: 9 MMOL/L (ref 7–15)
APPEARANCE UR: CLEAR
AST SERPL W P-5'-P-CCNC: 25 U/L (ref 10–50)
BACTERIA #/AREA URNS HPF: ABNORMAL /HPF
BILIRUB SERPL-MCNC: 0.4 MG/DL
BILIRUB UR QL STRIP: NEGATIVE
BUN SERPL-MCNC: 14.4 MG/DL (ref 8–23)
CALCIUM SERPL-MCNC: 9.4 MG/DL (ref 8.8–10.2)
CHLORIDE SERPL-SCNC: 103 MMOL/L (ref 98–107)
CHOLEST SERPL-MCNC: 136 MG/DL
COLOR UR AUTO: YELLOW
CREAT SERPL-MCNC: 0.97 MG/DL (ref 0.67–1.17)
DEPRECATED HCO3 PLAS-SCNC: 25 MMOL/L (ref 22–29)
GFR SERPL CREATININE-BSD FRML MDRD: 89 ML/MIN/1.73M2
GLUCOSE SERPL-MCNC: 100 MG/DL (ref 70–99)
GLUCOSE UR STRIP-MCNC: NEGATIVE MG/DL
HDLC SERPL-MCNC: 35 MG/DL
HGB UR QL STRIP: NEGATIVE
HYALINE CASTS #/AREA URNS LPF: ABNORMAL /LPF
KETONES UR STRIP-MCNC: NEGATIVE MG/DL
LDLC SERPL CALC-MCNC: 86 MG/DL
LEUKOCYTE ESTERASE UR QL STRIP: ABNORMAL
MUCOUS THREADS #/AREA URNS LPF: PRESENT /LPF
NITRATE UR QL: NEGATIVE
NONHDLC SERPL-MCNC: 101 MG/DL
PH UR STRIP: 6 [PH] (ref 5–7)
POTASSIUM SERPL-SCNC: 4.4 MMOL/L (ref 3.4–5.3)
PROT SERPL-MCNC: 7.4 G/DL (ref 6.4–8.3)
RBC #/AREA URNS AUTO: ABNORMAL /HPF
SODIUM SERPL-SCNC: 137 MMOL/L (ref 136–145)
SP GR UR STRIP: 1.02 (ref 1–1.03)
SQUAMOUS #/AREA URNS AUTO: ABNORMAL /LPF
TRIGL SERPL-MCNC: 74 MG/DL
UROBILINOGEN UR STRIP-ACNC: 1 E.U./DL
WBC #/AREA URNS AUTO: ABNORMAL /HPF

## 2022-12-16 PROCEDURE — 80053 COMPREHEN METABOLIC PANEL: CPT | Performed by: FAMILY MEDICINE

## 2022-12-16 PROCEDURE — 36415 COLL VENOUS BLD VENIPUNCTURE: CPT | Performed by: FAMILY MEDICINE

## 2022-12-16 PROCEDURE — 81001 URINALYSIS AUTO W/SCOPE: CPT | Performed by: FAMILY MEDICINE

## 2022-12-16 PROCEDURE — 80061 LIPID PANEL: CPT | Performed by: FAMILY MEDICINE

## 2022-12-16 PROCEDURE — 90471 IMMUNIZATION ADMIN: CPT | Performed by: FAMILY MEDICINE

## 2022-12-16 PROCEDURE — 90715 TDAP VACCINE 7 YRS/> IM: CPT | Performed by: FAMILY MEDICINE

## 2022-12-16 PROCEDURE — 99214 OFFICE O/P EST MOD 30 MIN: CPT | Mod: 25 | Performed by: FAMILY MEDICINE

## 2022-12-16 RX ORDER — ATORVASTATIN CALCIUM 10 MG/1
10 TABLET, FILM COATED ORAL DAILY
Qty: 90 TABLET | Refills: 1 | Status: SHIPPED | OUTPATIENT
Start: 2022-12-16 | End: 2023-08-18

## 2022-12-16 RX ORDER — CARVEDILOL 25 MG/1
25 TABLET ORAL 2 TIMES DAILY WITH MEALS
Qty: 180 TABLET | Refills: 0 | Status: CANCELLED | OUTPATIENT
Start: 2022-12-16

## 2022-12-16 RX ORDER — LISINOPRIL 20 MG/1
20 TABLET ORAL DAILY
Qty: 90 TABLET | Refills: 1 | Status: SHIPPED | OUTPATIENT
Start: 2022-12-16 | End: 2024-01-19

## 2022-12-16 RX ORDER — QUETIAPINE FUMARATE 100 MG/1
100 TABLET, FILM COATED ORAL AT BEDTIME
Qty: 90 TABLET | Refills: 1 | Status: SHIPPED | OUTPATIENT
Start: 2022-12-16 | End: 2023-05-30

## 2022-12-16 NOTE — PROGRESS NOTES
Assessment & Plan     Ischemic cardiomyopathy with decreased LV function     -following with cardiology   Scheduled for cardiac MRI scan .  Scheduled to follow up with EP .    Hyperlipidemia, unspecified hyperlipidemia type      - Lipid panel reflex to direct LDL Non-fasting    Coronary artery disease involving native coronary artery of native heart without angina pectoris    - Lipid panel reflex to direct LDL Non-fasting  - atorvastatin (LIPITOR) 10 MG tablet; Take 1 tablet (10 mg) by mouth daily  - Comprehensive metabolic panel (BMP + Alb, Alk Phos, ALT, AST, Total. Bili, TP)    Heart failure with reduced ejection fraction, NYHA class III (H)  Following with cardiology   Awaiting testing .    Persistent insomnia  - QUEtiapine (SEROQUEL) 100 MG tablet; Take 1 tablet (100 mg) by mouth At Bedtime    Chronic anxiety  - QUEtiapine (SEROQUEL) 100 MG tablet; Take 1 tablet (100 mg) by mouth At Bedtime    Dysuria  - UA Macro with Reflex to Micro and Culture - lab collect  - Urine Microscopic    Chronic obstructive pulmonary disease, unspecified COPD type (H)  Stable will closely monitor     Alcoholism (H)-   Discussed cutting down     Screening for lung cancer  - CT Chest Lung Cancer Scrn Low Dose wo; Future    Benign essential hypertensio  - lisinopril (ZESTRIL) 20 MG tablet; Take 1 tablet (20 mg) by mouth daily    Personal history of nicotine dependence  - CT Chest Lung Cancer Scrn Low Dose wo; Future    Hematuria, unspecified type  -abnormal ct Scan could be due to cystitis   - Completing antibiotics   As hematuria persist will obtain consult for consideration of cystoscopy .  - Adult Urology  Referral; Future       MED REC REQUIRE  Post Medication Reconciliation Status: discharge medications reconciled, continue medications without change  Nicotine/Tobacco Cessation:  He reports that he has been smoking cigarettes. He has been smoking an average of .5 packs per day. He has never used smokeless  "tobacco.  Nicotine/Tobacco Cessation Plan:   Self help information given to patient      BMI:   Estimated body mass index is 26.6 kg/m  as calculated from the following:    Height as of this encounter: 1.715 m (5' 7.5\").    Weight as of this encounter: 78.2 kg (172 lb 6.4 oz).   Weight management plan: Discussed healthy diet and exercise guidelines        Return in about 4 months (around 4/16/2023) for Follow up.    Louisa Hurley MD  [facility] ALESSANDRO Grimm is a 62 year old, presenting for the following health issues:  Emergency Department      HPI     ED/UC Followup:    Facility:  [facility]  Date of visit: 12/11/2022  Reason for visit: hematuria  Current Status: feels good    Urine now better     Denies any chest pain or shortness of breath   Cutting down on alcohol .          Objective    /84   Pulse 56   Temp 97.2  F (36.2  C) (Tympanic)   Resp 16   Ht 1.715 m (5' 7.5\")   Wt 78.2 kg (172 lb 6.4 oz)   SpO2 96%   BMI 26.60 kg/m    Body mass index is 26.6 kg/m .  Physical Exam   GENERAL: healthy, alert and no distress  RESP: lungs clear to auscultation - no rales, rhonchi or wheezes  CV: regular rate and rhythm, normal S1 S2, no S3 or S4, no murmur, click or rub, no peripheral edema and peripheral pulses strong  ABDOMEN: soft, nontender, no hepatosplenomegaly, no masses and bowel sounds normal  MS: no gross musculoskeletal defects noted, no edema  SKIN: no suspicious lesions or rashes  NEURO: Normal strength and tone, mentation intact and speech normal  PSYCH: mentation appears normal, affect normal/bright            "

## 2022-12-19 ENCOUNTER — VIRTUAL VISIT (OUTPATIENT)
Dept: UROLOGY | Facility: CLINIC | Age: 62
End: 2022-12-19
Attending: FAMILY MEDICINE
Payer: COMMERCIAL

## 2022-12-19 VITALS — HEIGHT: 67 IN | WEIGHT: 172 LBS | BODY MASS INDEX: 27 KG/M2

## 2022-12-19 DIAGNOSIS — R31.0 GROSS HEMATURIA: Primary | ICD-10-CM

## 2022-12-19 DIAGNOSIS — Z12.5 SCREENING FOR PROSTATE CANCER: ICD-10-CM

## 2022-12-19 PROCEDURE — 99203 OFFICE O/P NEW LOW 30 MIN: CPT | Mod: 95 | Performed by: PHYSICIAN ASSISTANT

## 2022-12-19 ASSESSMENT — ENCOUNTER SYMPTOMS
VOMITING: 0
NAUSEA: 0
DYSURIA: 1
FEVER: 0
SHORTNESS OF BREATH: 0
CHILLS: 0
HEMATURIA: 1
FREQUENCY: 0

## 2022-12-19 ASSESSMENT — PAIN SCALES - GENERAL: PAINLEVEL: NO PAIN (0)

## 2022-12-19 NOTE — PATIENT INSTRUCTIONS
- Urine cytology to look for abnormal cells.  - CT Urogram scan: Please call [phone] to schedule this at the [facility] ([place]) or [facility] ([place]).   - Cystoscopy with the urologist to evaluate the interior of the bladder. Follow up as recommended by the urologist.     -PSA

## 2022-12-19 NOTE — LETTER
12/19/2022       RE: Alfa Cm  [address]  [facility] [address]     Dear Colleague,    Thank you for referring your patient, Alfa Cm, to the [facility]OLOGY [facility] [place] at [facility]. Please see a copy of my visit note below.    SEND LINK TO CELL PHONE   [phone]  OR SEND LINK TO EMAIL    PT LAST HAD GROSS HEM AT HIS DOC  Alfa is a 62 year old who is being evaluated via a billable video visit.      How would you like to obtain your AVS? MyChart  If the video visit is dropped, the invitation should be resent by: Send to e-mail at: [email or web address]  Will anyone else be joining your video visit? No      Video-Visit Details    Video Start Time: 1338    Type of service:  Video Visit    Video End Time:1348    Originating Location (pt. Location): Home        Distant Location (provider location):  On-site    Platform used for Video Visit: Snehal Garibay       REQUESTING PROVIDER   Louisa Hurley     REASON FOR CONSULT   Gross hematuria    HISTORY OF PRESENT ILLNESS   Mr. Cm is a 62 year old male seen today in the urology clinic in consultation for evaluation of gross hematuria and persistent microscopic hematuria.  Patient presented to the emergency department on 12/1/2022 for evaluation of the gross hematuria.  He also was having dysuria, urgency, and abdominal discomfort that had started a day or 2 prior to that.  Patient's urinalysis was suspicious for infection.  Urine culture showed greater than 100,000 CFU per mL of E. coli.  Patient was treated with antibiotics.  He does note the gross hematuria resolved after starting antibiotics.  Patient did have a repeat urinalysis which showed persistent microscopic hematuria.    He is a current everyday smoker.  He smokes typically 1 pack every 2 days.  No history of significant chemical exposure.  Family history of nephrolithiasis.  No personal history of nephrolithiasis.  This  was his first urinary tract infection.  At baseline, patient denies hematuria, dysuria, urgency, frequency, incomplete emptying, and nocturia.  He categorizes his urinary stream is strong.    CT imaging was obtained when patient was in the emergency department.  This does show evidence of bladder wall thickening.  No noted stones or large masses.    PSA up to date: last done 2021, 0.83 11/8/21    Blood thinners: 81 mg aspirin    Retention or clots: Neither    Hematuria Risk Factors:  Age >40: Yes   Smoking history: Yes  Occupational exposure to chemicals or dyes (ie, benzenes, aromatic amines): no  History of urologic disorder or disease: no  History of irritative voiding symptoms: no  History of urinary tract infection: yes-one time  Analgesic abuse: no  History of pelvic irradiation: no    The following portions of the patient's history were reviewed and updated as appropriate: allergies, current medications, past family history, past medical history, past social history, past surgical history and problem list.     REVIEW OF SYSTEMS   Review of Systems   Constitutional: Negative for chills and fever.   Respiratory: Negative for shortness of breath.    Cardiovascular: Negative for chest pain.   Gastrointestinal: Negative for nausea and vomiting.   Genitourinary: Positive for dysuria (Resolved) and hematuria (Resolved). Negative for frequency and urgency.      Per HPI.     Patient Active Problem List   Diagnosis     ST elevation myocardial infarction involving right coronary artery (H)     Hyperlipidemia     Benign essential hypertension     Coronary artery disease involving native coronary artery     Tobacco dependence syndrome     STEMI (ST elevation myocardial infarction) (H)     Sepsis (H)     Coronary artery disease involving native coronary artery without angina pectoris     Iron deficiency anemia, unspecified iron deficiency anemia type     Anemia, unspecified type     Chronic combined systolic and diastolic  "congestive heart failure (H)     Alcohol dependence with alcohol-induced sleep disorder (H)     Persistent insomnia     Chronic obstructive pulmonary disease, unspecified COPD type (H)     Morbid obesity (H)      Past Medical History:   Diagnosis Date     Alcohol abuse      Coronary artery disease involving native coronary artery without angina pectoris 2011    angiplasty, stent placed at [facility]     Hyperlipidemia      Hypertension      Myocardial infarction (H)      Substance abuse (H)      Tobacco abuse       Past Surgical History:   Procedure Laterality Date     COLONOSCOPY N/A 2/1/2022    Procedure: COLONOSCOPY, FLEXIBLE, WITH LESION REMOVAL USING SNARE and biopsy forcep;  Surgeon: Mike Reid MD;  Location:  GI     COLONOSCOPY N/A 2/1/2022    Procedure: COLONOSCOPY, WITH POLYPECTOMies AND BIOPSY;  Surgeon: Mike Reid MD;  Location:  GI     [facility] PATIENT HAS A CORONARY ARTERY STENT        Social History:   Current everyday smoker.  He has smoked for approximately 10 years, 1 pack every 2 days.    Family History:   No known family history of  malignancy.  Family history of nephrolithiasis in his grandmother and sister.    Objective       PHYSICAL EXAM   Ht 1.702 m (5' 7\")   Wt 78 kg (172 lb)   BMI 26.94 kg/m     GENERAL: Healthy, alert and no distress  EYES: Eyes grossly normal to inspection.  No discharge or erythema, or obvious scleral/conjunctival abnormalities.  HENT: Normal cephalic/atraumatic.  External ears, nose and mouth without ulcers or lesions.  No nasal drainage visible.  NECK: No asymmetry, visible masses or scars  RESP: No audible wheeze, cough, or visible cyanosis.  No visible retractions or increased work of breathing.    MS: No gross musculoskeletal defects noted.  Normal range of motion.  No visible edema.  SKIN: Visible skin clear. No significant rash, abnormal pigmentation or lesions.  NEURO: Cranial nerves grossly intact.  Mentation and speech " appropriate for age.  PSYCH: Mentation appears normal, affect normal/bright, judgement and insight intact, normal speech and appearance well-groomed.    LABORATORY   Recent Labs   Lab Test 12/16/22  1425   COLOR Yellow   APPEARANCE Clear   URINEGLC Negative   URINEBILI Negative   URINEKETONE Negative   SG 1.020   UBLD Negative   URINEPH 6.0   PROTEIN Negative   UROBILINOGEN 1.0   NITRITE Negative   LEUKEST Trace*   RBCU 2-5*   WBCU 0-5     Lab Results   Component Value Date    PSA 0.83 11/08/2021      IMAGING     I personally reviewed the images.     CT Abdomen Pelvis w Contrast    Result Date: 12/11/2022  EXAM: CT ABDOMEN PELVIS W CONTRAST LOCATION: [facility] DATE/TIME: 12/11/2022 5:55 PM INDICATION: tyson umbilical abdominal pain, gross hematuria COMPARISON: None. TECHNIQUE: CT scan of the abdomen and pelvis was performed following injection of IV contrast. Multiplanar reformats were obtained. Dose reduction techniques were used. CONTRAST: 85mL Isovue 370 FINDINGS: LOWER CHEST: Cardiomegaly. HEPATOBILIARY: Tiny layering gallstones are present, no inflammatory gallbladder wall thickening. Bile ducts normal. Liver demonstrates minimal nodularity to its surface suggesting early changes of cirrhosis. No focal lesion. PANCREAS: Normal. SPLEEN: Normal. ADRENAL GLANDS: Normal. KIDNEYS/BLADDER: Benign right renal cyst needs no follow-up. No stone or hydronephrosis. There is prominent diffuse bladder wall thickening. BOWEL: There is a tiny umbilical hernia containing short loop of small bowel. No obstruction or inflammatory change. Abundant formed stool present within the rectum. Appendix normal. LYMPH NODES: Normal. VASCULATURE: Moderately heavy diffuse atherosclerotic calcifications. PELVIC ORGANS: Normal. MUSCULOSKELETAL: SI joint ankylosis.     IMPRESSION: 1.  Prominent diffuse bladder wall thickening likely inflammatory in nature though diffuse tumor infiltration also possible. 2.  Abundant  localized stool within rectum, bowel otherwise unremarkable. 3.  Tiny umbilical hernia contains a very short loop of nonobstructed small bowel. 4.  Cholelithiasis. Question early changes of cirrhosis.     Assessment & Plan    1. Gross hematuria    2. Screening for prostate cancer      I had the pleasure today of meeting with Mr. Cm to discuss his gross hematuria and persistent microscopic hematuria.  The differential diagnosis at this point includes stone disease, infection, BPH, prostatitis, urothelial malignancy, renal disorder versus another yet unknown diagnosis.  Many times, we do not find a cause, but we should rule out bad causes.      I did discuss with the patient that the radiologist commented on possible underlying infiltrative tumor based upon bladder wall thickening.  This also could be due to the infection that he had at that time.    At this time, recommend proceeding with comprehensive hematuria evaluation to include:    - Urine cytology to look for cells concerning for malignancy.  - CT urogram for upper tract imaging.  - Cystoscopy with the first available urologist to evaluate the interior of the bladder. Follow up for hematuria as recommended by urologist performing cystoscopic evaluation.    -PSA     We also discussed that it may be reasonable to consider postvoid residual at the time of his cystoscopy due to him getting urinary tract infection.  We discussed that these are less common in men.    Signed by:     Ava Killian PA-C 12/19/2022 1:38 PM

## 2022-12-19 NOTE — PROGRESS NOTES
SEND LINK TO CELL PHONE   [phone]  OR SEND LINK TO EMAIL    PT LAST HAD GROSS HEM AT HIS DOC  Alfa is a 62 year old who is being evaluated via a billable video visit.      How would you like to obtain your AVS? MyChart  If the video visit is dropped, the invitation should be resent by: Send to e-mail at: [email or web address]  Will anyone else be joining your video visit? No      Video-Visit Details    Video Start Time: 1338    Type of service:  Video Visit    Video End Time:1348    Originating Location (pt. Location): Home        Distant Location (provider location):  On-site    Platform used for Video Visit: Snehal Garibay      REQUESTING PROVIDER   Louisa Hurley     REASON FOR CONSULT   Gross hematuria    HISTORY OF PRESENT ILLNESS   Mr. Cm is a 62 year old male seen today in the urology clinic in consultation for evaluation of gross hematuria and persistent microscopic hematuria.  Patient presented to the emergency department on 12/1/2022 for evaluation of the gross hematuria.  He also was having dysuria, urgency, and abdominal discomfort that had started a day or 2 prior to that.  Patient's urinalysis was suspicious for infection.  Urine culture showed greater than 100,000 CFU per mL of E. coli.  Patient was treated with antibiotics.  He does note the gross hematuria resolved after starting antibiotics.  Patient did have a repeat urinalysis which showed persistent microscopic hematuria.    He is a current everyday smoker.  He smokes typically 1 pack every 2 days.  No history of significant chemical exposure.  Family history of nephrolithiasis.  No personal history of nephrolithiasis.  This was his first urinary tract infection.  At baseline, patient denies hematuria, dysuria, urgency, frequency, incomplete emptying, and nocturia.  He categorizes his urinary stream is strong.    CT imaging was obtained when patient was in the emergency department.  This does show evidence of bladder wall  thickening.  No noted stones or large masses.    PSA up to date: last done 2021, 0.83 11/8/21    Blood thinners: 81 mg aspirin    Retention or clots: Neither    Hematuria Risk Factors:  Age >40: Yes   Smoking history: Yes  Occupational exposure to chemicals or dyes (ie, benzenes, aromatic amines): no  History of urologic disorder or disease: no  History of irritative voiding symptoms: no  History of urinary tract infection: yes-one time  Analgesic abuse: no  History of pelvic irradiation: no    The following portions of the patient's history were reviewed and updated as appropriate: allergies, current medications, past family history, past medical history, past social history, past surgical history and problem list.     REVIEW OF SYSTEMS   Review of Systems   Constitutional: Negative for chills and fever.   Respiratory: Negative for shortness of breath.    Cardiovascular: Negative for chest pain.   Gastrointestinal: Negative for nausea and vomiting.   Genitourinary: Positive for dysuria (Resolved) and hematuria (Resolved). Negative for frequency and urgency.      Per HPI.     Patient Active Problem List   Diagnosis     ST elevation myocardial infarction involving right coronary artery (H)     Hyperlipidemia     Benign essential hypertension     Coronary artery disease involving native coronary artery     Tobacco dependence syndrome     STEMI (ST elevation myocardial infarction) (H)     Sepsis (H)     Coronary artery disease involving native coronary artery without angina pectoris     Iron deficiency anemia, unspecified iron deficiency anemia type     Anemia, unspecified type     Chronic combined systolic and diastolic congestive heart failure (H)     Alcohol dependence with alcohol-induced sleep disorder (H)     Persistent insomnia     Chronic obstructive pulmonary disease, unspecified COPD type (H)     Morbid obesity (H)      Past Medical History:   Diagnosis Date     Alcohol abuse      Coronary artery disease  "involving native coronary artery without angina pectoris 2011    angiplasty, stent [facility] at [facility]     Hyperlipidemia      Hypertension      Myocardial infarction (H)      Substance abuse (H)      Tobacco abuse       Past Surgical History:   Procedure Laterality Date     COLONOSCOPY N/A 2/1/2022    Procedure: COLONOSCOPY, FLEXIBLE, WITH LESION REMOVAL USING SNARE and biopsy forcep;  Surgeon: Mike Reid MD;  Location:  GI     COLONOSCOPY N/A 2/1/2022    Procedure: COLONOSCOPY, WITH POLYPECTOMies AND BIOPSY;  Surgeon: Mike Reid MD;  Location:  GI     [facility] PATIENT HAS A CORONARY ARTERY STENT        Social History:   Current everyday smoker.  He has smoked for approximately 10 years, 1 pack every 2 days.    Family History:   No known family history of  malignancy.  Family history of nephrolithiasis in his grandmother and sister.    Objective      PHYSICAL EXAM   Ht 1.702 m (5' 7\")   Wt 78 kg (172 lb)   BMI 26.94 kg/m     GENERAL: Healthy, alert and no distress  EYES: Eyes grossly normal to inspection.  No discharge or erythema, or obvious scleral/conjunctival abnormalities.  HENT: Normal cephalic/atraumatic.  External ears, nose and mouth without ulcers or lesions.  No nasal drainage visible.  NECK: No asymmetry, visible masses or scars  RESP: No audible wheeze, cough, or visible cyanosis.  No visible retractions or increased work of breathing.    MS: No gross musculoskeletal defects noted.  Normal range of motion.  No visible edema.  SKIN: Visible skin clear. No significant rash, abnormal pigmentation or lesions.  NEURO: Cranial nerves grossly intact.  Mentation and speech appropriate for age.  PSYCH: Mentation appears normal, affect normal/bright, judgement and insight intact, normal speech and appearance well-groomed.    LABORATORY   Recent Labs   Lab Test 12/16/22  1425   COLOR Yellow   APPEARANCE Clear   URINEGLC Negative   URINEBILI Negative   URINEKETONE Negative   SG " 1.020   UBLD Negative   URINEPH 6.0   PROTEIN Negative   UROBILINOGEN 1.0   NITRITE Negative   LEUKEST Trace*   RBCU 2-5*   WBCU 0-5     Lab Results   Component Value Date    PSA 0.83 11/08/2021      IMAGING     I personally reviewed the images.     CT Abdomen Pelvis w Contrast    Result Date: 12/11/2022  EXAM: CT ABDOMEN PELVIS W CONTRAST LOCATION: [facility] DATE/TIME: 12/11/2022 5:55 PM INDICATION: tyson umbilical abdominal pain, gross hematuria COMPARISON: None. TECHNIQUE: CT scan of the abdomen and pelvis was performed following injection of IV contrast. Multiplanar reformats were obtained. Dose reduction techniques were used. CONTRAST: 85mL Isovue 370 FINDINGS: LOWER CHEST: Cardiomegaly. HEPATOBILIARY: Tiny layering gallstones are present, no inflammatory gallbladder wall thickening. Bile ducts normal. Liver demonstrates minimal nodularity to its surface suggesting early changes of cirrhosis. No focal lesion. PANCREAS: Normal. SPLEEN: Normal. ADRENAL GLANDS: Normal. KIDNEYS/BLADDER: Benign right renal cyst needs no follow-up. No stone or hydronephrosis. There is prominent diffuse bladder wall thickening. BOWEL: There is a tiny umbilical hernia containing short loop of small bowel. No obstruction or inflammatory change. Abundant formed stool present within the rectum. Appendix normal. LYMPH NODES: Normal. VASCULATURE: Moderately heavy diffuse atherosclerotic calcifications. PELVIC ORGANS: Normal. MUSCULOSKELETAL: SI joint ankylosis.     IMPRESSION: 1.  Prominent diffuse bladder wall thickening likely inflammatory in nature though diffuse tumor infiltration also possible. 2.  Abundant localized stool within rectum, bowel otherwise unremarkable. 3.  Tiny umbilical hernia contains a very short loop of nonobstructed small bowel. 4.  Cholelithiasis. Question early changes of cirrhosis.     Assessment & Plan    1. Gross hematuria    2. Screening for prostate cancer      I had the pleasure today  of meeting with Mr. Cm to discuss his gross hematuria and persistent microscopic hematuria.  The differential diagnosis at this point includes stone disease, infection, BPH, prostatitis, urothelial malignancy, renal disorder versus another yet unknown diagnosis.  Many times, we do not find a cause, but we should rule out bad causes.      I did discuss with the patient that the radiologist commented on possible underlying infiltrative tumor based upon bladder wall thickening.  This also could be due to the infection that he had at that time.    At this time, recommend proceeding with comprehensive hematuria evaluation to include:    - Urine cytology to look for cells concerning for malignancy.  - CT urogram for upper tract imaging.  - Cystoscopy with the first available urologist to evaluate the interior of the bladder. Follow up for hematuria as recommended by urologist performing cystoscopic evaluation.    -PSA     We also discussed that it may be reasonable to consider postvoid residual at the time of his cystoscopy due to him getting urinary tract infection.  We discussed that these are less common in men.    Signed by:     Ava Killian PA-C 12/19/2022 1:38 PM

## 2022-12-28 ENCOUNTER — TELEPHONE (OUTPATIENT)
Dept: FAMILY MEDICINE | Facility: CLINIC | Age: 62
End: 2022-12-28

## 2022-12-28 DIAGNOSIS — R30.0 DYSURIA: Primary | ICD-10-CM

## 2022-12-28 NOTE — TELEPHONE ENCOUNTER
S-(situation): Patient calls for medication     B-(background): Requesting prescription for cephalexin for urinary symptoms. Initially treated in ER visit.    A-(assessment): Cephalexin ordered at 12/11/22 ER visit. Patient states he finished antibiotic on Sunday and symptoms returned last night with increased urinary frequency and burning with urination. Patient denies any other symptoms. Questioned patient about length of antibiotic treatment as he was given 7 day course of treatment in ER, but patient repeats that he finished it on Sunday. Patient had follow-up appointment with Dr. Hurley on 12/16/22 and referred to Urology for blood in the urine.     R-(recommendations): Routed to provider to review, informed patient he may need follow-up appointment and repeat urine testing for return of symptoms.     Monika Causey RN  [facility]

## 2022-12-29 ENCOUNTER — LAB (OUTPATIENT)
Dept: LAB | Facility: CLINIC | Age: 62
End: 2022-12-29
Payer: COMMERCIAL

## 2022-12-29 DIAGNOSIS — R30.0 DYSURIA: ICD-10-CM

## 2022-12-29 LAB
ALBUMIN UR-MCNC: >=300 MG/DL
APPEARANCE UR: CLEAR
BACTERIA #/AREA URNS HPF: ABNORMAL /HPF
BILIRUB UR QL STRIP: ABNORMAL
COLOR UR AUTO: ABNORMAL
GLUCOSE UR STRIP-MCNC: NEGATIVE MG/DL
HGB UR QL STRIP: ABNORMAL
KETONES UR STRIP-MCNC: NEGATIVE MG/DL
LEUKOCYTE ESTERASE UR QL STRIP: ABNORMAL
NITRATE UR QL: POSITIVE
PH UR STRIP: 5.5 [PH] (ref 5–7)
RBC #/AREA URNS AUTO: ABNORMAL /HPF
SP GR UR STRIP: 1.02 (ref 1–1.03)
UROBILINOGEN UR STRIP-ACNC: 2 E.U./DL
WBC #/AREA URNS AUTO: >100 /HPF

## 2022-12-29 PROCEDURE — 87186 SC STD MICRODIL/AGAR DIL: CPT

## 2022-12-29 PROCEDURE — 81001 URINALYSIS AUTO W/SCOPE: CPT

## 2022-12-29 PROCEDURE — 87086 URINE CULTURE/COLONY COUNT: CPT

## 2022-12-29 NOTE — TELEPHONE ENCOUNTER
Called pt and relayed below, verbalized understanding and agreeable to plan. Pt scheduled for today, states he is moving today. Reminded pt that he is scheduled for stress test, states he needs to reschedule appt, pt will call. No further questions.     Torey OCAMPO RN

## 2022-12-30 DIAGNOSIS — R31.9 URINARY TRACT INFECTION WITH HEMATURIA, SITE UNSPECIFIED: Primary | ICD-10-CM

## 2022-12-30 DIAGNOSIS — N39.0 URINARY TRACT INFECTION WITH HEMATURIA, SITE UNSPECIFIED: Primary | ICD-10-CM

## 2022-12-30 RX ORDER — NITROFURANTOIN 25; 75 MG/1; MG/1
100 CAPSULE ORAL 2 TIMES DAILY
Qty: 14 CAPSULE | Refills: 0 | Status: SHIPPED | OUTPATIENT
Start: 2022-12-30 | End: 2023-03-16

## 2022-12-31 LAB — BACTERIA UR CULT: ABNORMAL

## 2023-01-02 ENCOUNTER — LAB (OUTPATIENT)
Dept: LAB | Facility: CLINIC | Age: 63
End: 2023-01-02
Payer: COMMERCIAL

## 2023-01-02 DIAGNOSIS — R31.0 GROSS HEMATURIA: ICD-10-CM

## 2023-01-02 DIAGNOSIS — Z12.5 SCREENING FOR PROSTATE CANCER: ICD-10-CM

## 2023-01-02 LAB — PSA SERPL-MCNC: 4.68 NG/ML (ref 0–4.5)

## 2023-01-02 PROCEDURE — 84153 ASSAY OF PSA TOTAL: CPT

## 2023-01-02 PROCEDURE — 36415 COLL VENOUS BLD VENIPUNCTURE: CPT

## 2023-01-02 PROCEDURE — 88112 CYTOPATH CELL ENHANCE TECH: CPT | Performed by: PATHOLOGY

## 2023-01-03 LAB
PATH REPORT.COMMENTS IMP SPEC: NORMAL
PATH REPORT.FINAL DX SPEC: NORMAL
PATH REPORT.GROSS SPEC: NORMAL
PATH REPORT.MICROSCOPIC SPEC OTHER STN: NORMAL
PATH REPORT.RELEVANT HX SPEC: NORMAL

## 2023-01-05 ENCOUNTER — TELEPHONE (OUTPATIENT)
Dept: CARDIOLOGY | Facility: CLINIC | Age: 63
End: 2023-01-05

## 2023-01-05 ENCOUNTER — OFFICE VISIT (OUTPATIENT)
Dept: FAMILY MEDICINE | Facility: CLINIC | Age: 63
End: 2023-01-05
Payer: COMMERCIAL

## 2023-01-05 VITALS
OXYGEN SATURATION: 98 % | RESPIRATION RATE: 14 BRPM | HEART RATE: 61 BPM | TEMPERATURE: 97.2 F | DIASTOLIC BLOOD PRESSURE: 69 MMHG | BODY MASS INDEX: 27 KG/M2 | WEIGHT: 172 LBS | HEIGHT: 67 IN | SYSTOLIC BLOOD PRESSURE: 103 MMHG

## 2023-01-05 DIAGNOSIS — I50.20 HEART FAILURE WITH REDUCED EJECTION FRACTION, NYHA CLASS III (H): ICD-10-CM

## 2023-01-05 DIAGNOSIS — J44.9 CHRONIC OBSTRUCTIVE PULMONARY DISEASE, UNSPECIFIED COPD TYPE (H): ICD-10-CM

## 2023-01-05 DIAGNOSIS — Z72.0 TOBACCO ABUSE: ICD-10-CM

## 2023-01-05 DIAGNOSIS — F10.21 ALCOHOL DEPENDENCE IN REMISSION (H): Primary | ICD-10-CM

## 2023-01-05 DIAGNOSIS — Z23 HIGH PRIORITY FOR 2019-NCOV VACCINE: ICD-10-CM

## 2023-01-05 DIAGNOSIS — E66.01 MORBID OBESITY (H): ICD-10-CM

## 2023-01-05 PROBLEM — F10.282 ALCOHOL DEPENDENCE WITH ALCOHOL-INDUCED SLEEP DISORDER (H): Status: RESOLVED | Noted: 2020-06-05 | Resolved: 2023-01-05

## 2023-01-05 PROBLEM — F10.20 ALCOHOL DEPENDENCE (H): Status: ACTIVE | Noted: 2023-01-05

## 2023-01-05 PROCEDURE — 99214 OFFICE O/P EST MOD 30 MIN: CPT | Mod: 25 | Performed by: FAMILY MEDICINE

## 2023-01-05 PROCEDURE — 0134A COVID-19 VACCINE BIVALENT BOOSTER 18+ (MODERNA): CPT | Performed by: FAMILY MEDICINE

## 2023-01-05 PROCEDURE — 91313 COVID-19 VACCINE BIVALENT BOOSTER 18+ (MODERNA): CPT | Performed by: FAMILY MEDICINE

## 2023-01-05 RX ORDER — NICOTINE 21 MG/24HR
1 PATCH, TRANSDERMAL 24 HOURS TRANSDERMAL EVERY 24 HOURS
Qty: 30 PATCH | Refills: 0 | Status: SHIPPED | OUTPATIENT
Start: 2023-01-05 | End: 2023-08-04

## 2023-01-05 ASSESSMENT — ENCOUNTER SYMPTOMS
ACTIVITY CHANGE: 0
HEADACHES: 0
APPETITE CHANGE: 0
AGITATION: 0
SHORTNESS OF BREATH: 0
COUGH: 0
ABDOMINAL PAIN: 0
ARTHRALGIAS: 0
BACK PAIN: 0
ABDOMINAL DISTENTION: 0

## 2023-01-05 NOTE — TELEPHONE ENCOUNTER
Reviewed visit with Dr Sebastian and he wants cMRI completed prior to appt.  Scheduling (Olga) left message to discuss this with patient.  Waiting for call back from patient.  Will send my chart message to patient as well.  MADELYN Miller

## 2023-01-05 NOTE — PROGRESS NOTES
Assessment & Plan     Chronic obstructive pulmonary disease, unspecified COPD type (H)  - discussed quitting smoking   - discussed to continue with trelegy inhaler .    Heart failure with reduced ejection fraction, NYHA class III (H)  Seen by cardiologist .  - awaiting mri   -Recommend to continue aspirin , atorvastatin , lisinopril ,coreg .    Morbid obesity (H)  -discussed diet modification     Alcohol dependence in remission (H)  - recommend to continue to abstain .    Hematuria   - follow up cystoscopy is scheduled .    Tobacco abuse  - nicotine (NICODERM CQ) 14 MG/24HR 24 hr patch; Place 1 patch onto the skin every 24 hours    High priority for 2019-nCoV vaccine  - COVID-19,PF,MODERNA BIVALENT 18+Yrs        Return in about 4 months (around 5/5/2023) for Routine preventive.    Louisa Hurley MD  [facility]imm is a 62 year old, presenting for the following health issues:  RECHECK and Imm/Inj (COVID-19 VACCINE)      History of Present Illness       Reason for visit:  Follow up appontment    He eats 2-3 servings of fruits and vegetables daily.He consumes 3 sweetened beverage(s) daily.He exercises with enough effort to increase his heart rate 10 to 19 minutes per day.  He exercises with enough effort to increase his heart rate 4 days per week.   He is taking medications regularly.           Review of Systems   Constitutional: Negative for activity change and appetite change.   Respiratory: Negative for cough and shortness of breath.    Cardiovascular: Negative for chest pain.   Gastrointestinal: Negative for abdominal distention and abdominal pain.   Musculoskeletal: Negative for arthralgias and back pain.   Neurological: Negative for headaches.   Psychiatric/Behavioral: Negative for agitation and behavioral problems.            Objective    /69 (BP Location: Right arm, Patient Position: Sitting, Cuff Size: Adult Large)   Pulse 61   Temp 97.2  F (36.2  C) (Pulmonary  "Artery)   Resp 14   Ht 1.702 m (5' 7\")   Wt 78 kg (172 lb)   SpO2 98%   BMI 26.94 kg/m    Body mass index is 26.94 kg/m .  Physical Exam  HENT:      Head: Normocephalic.      Right Ear: Tympanic membrane normal.   Cardiovascular:      Rate and Rhythm: Normal rate.      Pulses: Normal pulses.   Pulmonary:      Effort: Pulmonary effort is normal.   Abdominal:      General: Abdomen is flat.   Musculoskeletal:         General: Normal range of motion.   Skin:     General: Skin is warm.   Neurological:      General: No focal deficit present.      Mental Status: He is alert.   Psychiatric:         Mood and Affect: Mood normal.                  "

## 2023-01-06 ENCOUNTER — HOSPITAL ENCOUNTER (OUTPATIENT)
Dept: CT IMAGING | Facility: CLINIC | Age: 63
Discharge: HOME | End: 2023-01-06
Attending: FAMILY MEDICINE | Admitting: FAMILY MEDICINE
Payer: COMMERCIAL

## 2023-01-06 DIAGNOSIS — Z12.2 SCREENING FOR LUNG CANCER: ICD-10-CM

## 2023-01-06 DIAGNOSIS — Z87.891 PERSONAL HISTORY OF NICOTINE DEPENDENCE: ICD-10-CM

## 2023-01-06 DIAGNOSIS — N28.89 RENAL MASS: Primary | ICD-10-CM

## 2023-01-06 PROCEDURE — 71271 CT THORAX LUNG CANCER SCR C-: CPT

## 2023-01-09 ENCOUNTER — TELEPHONE (OUTPATIENT)
Dept: FAMILY MEDICINE | Facility: CLINIC | Age: 63
End: 2023-01-09

## 2023-01-09 NOTE — TELEPHONE ENCOUNTER
Ava Killian PA-C Jain, Aparna, MD; P Lv Sb3/5 Santos Segura (Rn)  He actually needs a CT Urogram for his gross hematuria work up, which he needs to schedule, so he could hold off on the US.

## 2023-01-09 NOTE — TELEPHONE ENCOUNTER
Alfa is calling back. Gave number for him to schedule CT urogram.      Said needs more medication. He completed therapy but says his symptoms have come back. Is feelingnurinary urgency--no other symptoms.  He is asking for more Macrobid. I did inform him he has been treated but will have provider review. Informed him Dr. Hurley is out of office this week.     Adry Monae R.N.

## 2023-01-09 NOTE — TELEPHONE ENCOUNTER
LM for call back to triage -  See below     Cc: Ava Killian PA-C  Team     Please inform patient his ct chest suggest presence of 1.7 cm exophytic renal mass .   Recommend follow up renal ultrasound scan .   I have ordered the follow up renal scan .     Thanks   Louisa Hurley MD.       Lianne Sanchez RN

## 2023-01-10 NOTE — TELEPHONE ENCOUNTER
"Called pt and relayed message below. Pt states, \"I am fine I don't need a medication any more I feel fine.\" No further questions.    Torey OCAMPO RN    "

## 2023-01-10 NOTE — TELEPHONE ENCOUNTER
Spoke to patient to discuss that appointment for 1/11 needs to be rescheduled.  cMRI not completed yet.  Discussed with patient that scheduling has been trying to get a hold of him to move procedure sooner and change appt.  Patient unable to speak with scheduling at this time.  He asked that he be called around 1pm.  Sent message to scheduling to call patient this afternoon.  MADELYN Miller

## 2023-01-10 NOTE — TELEPHONE ENCOUNTER
May require another visit for review, I did look at the chart and looks like he was prescribed Macrobid, appropriate sensitivities on urine culture for urinary tract infection.  If he has ongoing symptoms, he may need to drop off another urine sample so we can see what is going on.    Jose Alfredo Neff MD

## 2023-01-25 ENCOUNTER — HOSPITAL ENCOUNTER (OUTPATIENT)
Dept: CARDIOLOGY | Facility: CLINIC | Age: 63
Discharge: HOME | End: 2023-01-25
Attending: INTERNAL MEDICINE | Admitting: INTERNAL MEDICINE
Payer: COMMERCIAL

## 2023-01-25 VITALS — DIASTOLIC BLOOD PRESSURE: 71 MMHG | SYSTOLIC BLOOD PRESSURE: 105 MMHG | HEART RATE: 100 BPM

## 2023-01-25 DIAGNOSIS — I25.5 ISCHEMIC CARDIOMYOPATHY: ICD-10-CM

## 2023-01-25 DIAGNOSIS — I10 BENIGN ESSENTIAL HYPERTENSION: ICD-10-CM

## 2023-01-25 DIAGNOSIS — I25.10 CORONARY ARTERY DISEASE INVOLVING NATIVE CORONARY ARTERY: ICD-10-CM

## 2023-01-25 DIAGNOSIS — I50.20 HEART FAILURE WITH REDUCED EJECTION FRACTION, NYHA CLASS III (H): ICD-10-CM

## 2023-01-25 DIAGNOSIS — I21.11 ST ELEVATION MYOCARDIAL INFARCTION INVOLVING RIGHT CORONARY ARTERY (H): Primary | ICD-10-CM

## 2023-01-25 DIAGNOSIS — E78.5 HYPERLIPIDEMIA: ICD-10-CM

## 2023-01-25 DIAGNOSIS — I50.20 HEART FAILURE WITH REDUCED EJECTION FRACTION, NYHA CLASS II (H): ICD-10-CM

## 2023-01-25 DIAGNOSIS — I21.3 STEMI (ST ELEVATION MYOCARDIAL INFARCTION) (H): ICD-10-CM

## 2023-01-25 PROCEDURE — A9585 GADOBUTROL INJECTION: HCPCS | Performed by: INTERNAL MEDICINE

## 2023-01-25 PROCEDURE — 93018 CV STRESS TEST I&R ONLY: CPT | Performed by: INTERNAL MEDICINE

## 2023-01-25 PROCEDURE — 93016 CV STRESS TEST SUPVJ ONLY: CPT | Performed by: INTERNAL MEDICINE

## 2023-01-25 PROCEDURE — 255N000002 HC RX 255 OP 636: Performed by: INTERNAL MEDICINE

## 2023-01-25 PROCEDURE — 75563 CARD MRI W/STRESS IMG & DYE: CPT | Mod: 26 | Performed by: INTERNAL MEDICINE

## 2023-01-25 PROCEDURE — 250N000011 HC RX IP 250 OP 636: Performed by: INTERNAL MEDICINE

## 2023-01-25 PROCEDURE — 93017 CV STRESS TEST TRACING ONLY: CPT

## 2023-01-25 RX ORDER — GADOBUTROL 604.72 MG/ML
20 INJECTION INTRAVENOUS ONCE
Status: COMPLETED | OUTPATIENT
Start: 2023-01-25 | End: 2023-01-25

## 2023-01-25 RX ORDER — AMINOPHYLLINE 25 MG/ML
100 INJECTION, SOLUTION INTRAVENOUS ONCE
Status: DISCONTINUED | OUTPATIENT
Start: 2023-01-25 | End: 2023-01-26 | Stop reason: HOSPADM

## 2023-01-25 RX ORDER — CAFFEINE CITRATE 20 MG/ML
60 SOLUTION INTRAVENOUS
Status: DISCONTINUED | OUTPATIENT
Start: 2023-01-25 | End: 2023-01-26 | Stop reason: HOSPADM

## 2023-01-25 RX ORDER — ACYCLOVIR 200 MG/1
0-1 CAPSULE ORAL
Status: DISCONTINUED | OUTPATIENT
Start: 2023-01-25 | End: 2023-01-26 | Stop reason: HOSPADM

## 2023-01-25 RX ORDER — ONDANSETRON 2 MG/ML
4 INJECTION INTRAMUSCULAR; INTRAVENOUS
Status: DISCONTINUED | OUTPATIENT
Start: 2023-01-25 | End: 2023-01-26 | Stop reason: HOSPADM

## 2023-01-25 RX ORDER — METHYLPREDNISOLONE SODIUM SUCCINATE 125 MG/2ML
125 INJECTION, POWDER, LYOPHILIZED, FOR SOLUTION INTRAMUSCULAR; INTRAVENOUS
Status: DISCONTINUED | OUTPATIENT
Start: 2023-01-25 | End: 2023-01-26 | Stop reason: HOSPADM

## 2023-01-25 RX ORDER — DIAZEPAM 5 MG
5 TABLET ORAL EVERY 30 MIN PRN
Status: DISCONTINUED | OUTPATIENT
Start: 2023-01-25 | End: 2023-01-26 | Stop reason: HOSPADM

## 2023-01-25 RX ORDER — DIPHENHYDRAMINE HCL 25 MG
25 CAPSULE ORAL
Status: DISCONTINUED | OUTPATIENT
Start: 2023-01-25 | End: 2023-01-26 | Stop reason: HOSPADM

## 2023-01-25 RX ORDER — ALBUTEROL SULFATE 90 UG/1
2 AEROSOL, METERED RESPIRATORY (INHALATION) EVERY 5 MIN PRN
Status: DISCONTINUED | OUTPATIENT
Start: 2023-01-25 | End: 2023-01-26 | Stop reason: HOSPADM

## 2023-01-25 RX ORDER — DIPHENHYDRAMINE HYDROCHLORIDE 50 MG/ML
25-50 INJECTION INTRAMUSCULAR; INTRAVENOUS
Status: DISCONTINUED | OUTPATIENT
Start: 2023-01-25 | End: 2023-01-26 | Stop reason: HOSPADM

## 2023-01-25 RX ORDER — REGADENOSON 0.08 MG/ML
0.4 INJECTION, SOLUTION INTRAVENOUS ONCE
Status: COMPLETED | OUTPATIENT
Start: 2023-01-25 | End: 2023-01-25

## 2023-01-25 RX ADMIN — REGADENOSON 0.4 MG: 0.08 INJECTION, SOLUTION INTRAVENOUS at 13:05

## 2023-01-25 RX ADMIN — GADOBUTROL 20 ML: 604.72 INJECTION INTRAVENOUS at 14:34

## 2023-01-26 NOTE — PROGRESS NOTES
Stress MR plus t2* completed per Dr Haines. Patient has a difficult time holding his breath and following instructions.

## 2023-01-27 ENCOUNTER — HOSPITAL ENCOUNTER (OUTPATIENT)
Dept: ULTRASOUND IMAGING | Facility: CLINIC | Age: 63
Discharge: HOME | End: 2023-01-27
Attending: FAMILY MEDICINE
Payer: COMMERCIAL

## 2023-01-27 ENCOUNTER — TELEPHONE (OUTPATIENT)
Dept: CARDIOLOGY | Facility: CLINIC | Age: 63
End: 2023-01-27
Payer: COMMERCIAL

## 2023-01-27 ENCOUNTER — HOSPITAL ENCOUNTER (OUTPATIENT)
Dept: CT IMAGING | Facility: CLINIC | Age: 63
Discharge: HOME | End: 2023-01-27
Attending: PHYSICIAN ASSISTANT
Payer: COMMERCIAL

## 2023-01-27 DIAGNOSIS — N28.89 RENAL MASS: ICD-10-CM

## 2023-01-27 DIAGNOSIS — R31.0 GROSS HEMATURIA: ICD-10-CM

## 2023-01-27 PROCEDURE — 250N000011 HC RX IP 250 OP 636: Performed by: PHYSICIAN ASSISTANT

## 2023-01-27 PROCEDURE — 250N000009 HC RX 250: Performed by: PHYSICIAN ASSISTANT

## 2023-01-27 PROCEDURE — 76775 US EXAM ABDO BACK WALL LIM: CPT

## 2023-01-27 PROCEDURE — 74178 CT ABD&PLV WO CNTR FLWD CNTR: CPT

## 2023-01-27 RX ORDER — IOPAMIDOL 755 MG/ML
500 INJECTION, SOLUTION INTRAVASCULAR ONCE
Status: COMPLETED | OUTPATIENT
Start: 2023-01-27 | End: 2023-01-27

## 2023-01-27 RX ADMIN — SODIUM CHLORIDE 95 ML: 9 INJECTION, SOLUTION INTRAVENOUS at 15:06

## 2023-01-27 RX ADMIN — IOPAMIDOL 81 ML: 755 INJECTION, SOLUTION INTRAVENOUS at 15:06

## 2023-01-27 NOTE — TELEPHONE ENCOUNTER
01/27/23 Attempted to call pt, no answer and no VM set up. Mailed ICD decision making guide to pt in [employer] Mail, will go out on 1/30   KHerroRN 325 pm

## 2023-02-10 ENCOUNTER — OFFICE VISIT (OUTPATIENT)
Dept: UROLOGY | Facility: CLINIC | Age: 63
End: 2023-02-10
Payer: COMMERCIAL

## 2023-02-10 DIAGNOSIS — N28.1 ACQUIRED CYST OF KIDNEY: ICD-10-CM

## 2023-02-10 DIAGNOSIS — R31.0 GROSS HEMATURIA: Primary | ICD-10-CM

## 2023-02-10 DIAGNOSIS — N30.01 ACUTE CYSTITIS WITH HEMATURIA: ICD-10-CM

## 2023-02-10 DIAGNOSIS — N32.89 BLADDER WALL THICKENING: ICD-10-CM

## 2023-02-10 LAB
ALBUMIN UR-MCNC: >=300 MG/DL
APPEARANCE UR: ABNORMAL
BILIRUB UR QL STRIP: ABNORMAL
COLOR UR AUTO: ABNORMAL
GLUCOSE UR STRIP-MCNC: NEGATIVE MG/DL
HGB UR QL STRIP: ABNORMAL
KETONES UR STRIP-MCNC: ABNORMAL MG/DL
LEUKOCYTE ESTERASE UR QL STRIP: ABNORMAL
NITRATE UR QL: POSITIVE
PH UR STRIP: 6 [PH] (ref 5–7)
SP GR UR STRIP: 1.02 (ref 1–1.03)
UROBILINOGEN UR STRIP-ACNC: 4 E.U./DL

## 2023-02-10 PROCEDURE — 87086 URINE CULTURE/COLONY COUNT: CPT | Performed by: STUDENT IN AN ORGANIZED HEALTH CARE EDUCATION/TRAINING PROGRAM

## 2023-02-10 PROCEDURE — 81003 URINALYSIS AUTO W/O SCOPE: CPT | Mod: QW | Performed by: STUDENT IN AN ORGANIZED HEALTH CARE EDUCATION/TRAINING PROGRAM

## 2023-02-10 PROCEDURE — 99214 OFFICE O/P EST MOD 30 MIN: CPT | Performed by: STUDENT IN AN ORGANIZED HEALTH CARE EDUCATION/TRAINING PROGRAM

## 2023-02-10 RX ORDER — LIDOCAINE HYDROCHLORIDE 20 MG/ML
JELLY TOPICAL ONCE
Status: DISCONTINUED | OUTPATIENT
Start: 2023-02-10 | End: 2023-02-10

## 2023-02-10 RX ORDER — NITROFURANTOIN 25; 75 MG/1; MG/1
100 CAPSULE ORAL 2 TIMES DAILY
Qty: 20 CAPSULE | Refills: 0 | Status: SHIPPED | OUTPATIENT
Start: 2023-02-10 | End: 2023-02-20

## 2023-02-10 NOTE — NURSING NOTE
Chief Complaint   Patient presents with     Hematuria     Cystoscopy     Pt's UA was Nitrite positive today.  Will be sent for culture.    Lurdes Albert, EMT

## 2023-02-10 NOTE — PROGRESS NOTES
CHIEF COMPLAINT   Alfa Cm who is a 62 year old male returns today for follow-up of gross hematuria, recurrent UTI    HPI   Alfa Cm is a 62 year old male returns today for follow-up of gross hematuria, recurrent UTI.    Had previously taken 1 week of nitrofurantoin prescribed 12/30/2022 but now has urgency, dysuria again    PHYSICAL EXAM  Patient is a 62 year old  male   Vitals: There were no vitals taken for this visit.  There is no height or weight on file to calculate BMI.  General Appearance Adult:   Alert, no acute distress, oriented  HENT: throat/mouth:normal, good dentition  Lungs: no respiratory distress, or pursed lip breathing  Heart: No obvious jugular venous distension present  Abdomen: nondistended  Musculoskeltal: extremities normal, no peripheral edema  Skin: no suspicious lesions or rashes  Neuro: Alert, oriented, speech and mentation normal  Psych: affect and mood normal  Gait: Normal  : deferred    All pertinent imaging reviewed:    Ct urogram 1/27/2023  IMPRESSION:   1.  Abnormal diffuse wall thickening of the urinary bladder appears  similar to 12/11/2022. Correlate with cystitis, neurogenic bladder,  and neoplasm is also not excluded.  2.  No hydronephrosis or urolithiasis. Mild bilateral renal edema  noted.  3.  Increased density but nonenhancing lesion at the upper right  kidney compatible with a hemorrhagic cyst.  4.  Tiny cholelithiasis.    Reviewed personally. Appears to have thickened bladder    ASSESSMENT and PLAN  62 year old male returns today for follow-up of gross hematuria, recurrent UTI, renal cyst    Was to have had cystoscopy to complete hematuria workup today but urine concerning for infection    No concerning source of hematuria on CT urogram. Has a small right renal cyst which appears benign. Bladder wall thickening is nonspecific    - Nitrofurantoin bid x 10 days, will need to follow urine culture, reschedule scope    Jeremiah Prieto MD   [facility]  Urology  [facility] Phone: [phone]

## 2023-02-10 NOTE — LETTER
2/10/2023       RE: Alfa Cm  [address]  [facility] [address]     Dear Colleague,    Thank you for referring your patient, Alfa Cm, to the [facility] UROLOGY [facility] [place] at [facility]. Please see a copy of my visit note below.    CHIEF COMPLAINT   Alfa Cm who is a 62 year old male returns today for follow-up of gross hematuria, recurrent UTI    HPI   Alfa Cm is a 62 year old male returns today for follow-up of gross hematuria, recurrent UTI.    Had previously taken 1 week of nitrofurantoin prescribed 12/30/2022 but now has urgency, dysuria again    PHYSICAL EXAM  Patient is a 62 year old  male   Vitals: There were no vitals taken for this visit.  There is no height or weight on file to calculate BMI.  General Appearance Adult:   Alert, no acute distress, oriented  HENT: throat/mouth:normal, good dentition  Lungs: no respiratory distress, or pursed lip breathing  Heart: No obvious jugular venous distension present  Abdomen: nondistended  Musculoskeltal: extremities normal, no peripheral edema  Skin: no suspicious lesions or rashes  Neuro: Alert, oriented, speech and mentation normal  Psych: affect and mood normal  Gait: Normal  : deferred    All pertinent imaging reviewed:    Ct urogram 1/27/2023  IMPRESSION:   1.  Abnormal diffuse wall thickening of the urinary bladder appears  similar to 12/11/2022. Correlate with cystitis, neurogenic bladder,  and neoplasm is also not excluded.  2.  No hydronephrosis or urolithiasis. Mild bilateral renal edema  noted.  3.  Increased density but nonenhancing lesion at the upper right  kidney compatible with a hemorrhagic cyst.  4.  Tiny cholelithiasis.    Reviewed personally. Appears to have thickened bladder    ASSESSMENT and PLAN  62 year old male returns today for follow-up of gross hematuria, recurrent UTI, renal cyst    Was to have had cystoscopy to complete hematuria workup today  but urine concerning for infection    No concerning source of hematuria on CT urogram. Has a small right renal cyst which appears benign. Bladder wall thickening is nonspecific    - Nitrofurantoin bid x 10 days, will need to follow urine culture, reschedule scope    Jeremiah Prieto MD   [facility] Urology  [facility] Phone: [phone]

## 2023-02-12 LAB — BACTERIA UR CULT: NORMAL

## 2023-02-22 DIAGNOSIS — N39.0 UTI (URINARY TRACT INFECTION): Primary | ICD-10-CM

## 2023-03-01 DIAGNOSIS — Z87.440 HISTORY OF RECURRENT UTIS: ICD-10-CM

## 2023-03-01 DIAGNOSIS — R35.0 URINARY FREQUENCY: Primary | ICD-10-CM

## 2023-03-03 ENCOUNTER — LAB (OUTPATIENT)
Dept: LAB | Facility: CLINIC | Age: 63
End: 2023-03-03
Payer: COMMERCIAL

## 2023-03-03 DIAGNOSIS — R35.0 URINARY FREQUENCY: ICD-10-CM

## 2023-03-03 DIAGNOSIS — Z87.440 HISTORY OF RECURRENT UTIS: ICD-10-CM

## 2023-03-03 LAB
ALBUMIN UR-MCNC: 30 MG/DL
APPEARANCE UR: ABNORMAL
BILIRUB UR QL STRIP: NEGATIVE
COLOR UR AUTO: YELLOW
GLUCOSE UR STRIP-MCNC: NEGATIVE MG/DL
HGB UR QL STRIP: ABNORMAL
KETONES UR STRIP-MCNC: NEGATIVE MG/DL
LEUKOCYTE ESTERASE UR QL STRIP: ABNORMAL
NITRATE UR QL: POSITIVE
PH UR STRIP: 6 [PH] (ref 5–7)
SP GR UR STRIP: 1.01 (ref 1–1.03)
UROBILINOGEN UR STRIP-ACNC: 4 E.U./DL

## 2023-03-03 PROCEDURE — 81003 URINALYSIS AUTO W/O SCOPE: CPT | Mod: QW

## 2023-03-03 PROCEDURE — 87186 SC STD MICRODIL/AGAR DIL: CPT

## 2023-03-03 PROCEDURE — 87086 URINE CULTURE/COLONY COUNT: CPT

## 2023-03-04 ENCOUNTER — APPOINTMENT (OUTPATIENT)
Dept: ULTRASOUND IMAGING | Facility: CLINIC | Age: 63
End: 2023-03-04
Attending: EMERGENCY MEDICINE
Payer: COMMERCIAL

## 2023-03-04 ENCOUNTER — HOSPITAL ENCOUNTER (EMERGENCY)
Facility: CLINIC | Age: 63
Discharge: HOME | End: 2023-03-04
Admitting: EMERGENCY MEDICINE
Payer: COMMERCIAL

## 2023-03-04 VITALS
RESPIRATION RATE: 20 BRPM | HEART RATE: 91 BPM | SYSTOLIC BLOOD PRESSURE: 94 MMHG | OXYGEN SATURATION: 98 % | TEMPERATURE: 98.4 F | DIASTOLIC BLOOD PRESSURE: 56 MMHG

## 2023-03-04 DIAGNOSIS — N43.3 HYDROCELE, UNSPECIFIED HYDROCELE TYPE: ICD-10-CM

## 2023-03-04 DIAGNOSIS — N45.3 EPIDIDYMOORCHITIS: ICD-10-CM

## 2023-03-04 LAB
ALBUMIN UR-MCNC: 30 MG/DL
AMORPH CRY #/AREA URNS HPF: ABNORMAL /HPF
APPEARANCE UR: ABNORMAL
BACTERIA #/AREA URNS HPF: ABNORMAL /HPF
BILIRUB UR QL STRIP: NEGATIVE
COLOR UR AUTO: YELLOW
GLUCOSE UR STRIP-MCNC: NEGATIVE MG/DL
HGB UR QL STRIP: ABNORMAL
KETONES UR STRIP-MCNC: NEGATIVE MG/DL
LEUKOCYTE ESTERASE UR QL STRIP: ABNORMAL
MUCOUS THREADS #/AREA URNS LPF: PRESENT /LPF
NITRATE UR QL: NEGATIVE
PH UR STRIP: 6 [PH] (ref 5–7)
RBC URINE: 5 /HPF
SP GR UR STRIP: 1.02 (ref 1–1.03)
SQUAMOUS EPITHELIAL: <1 /HPF
UROBILINOGEN UR STRIP-MCNC: 4 MG/DL
WBC CLUMPS #/AREA URNS HPF: PRESENT /HPF
WBC URINE: >182 /HPF

## 2023-03-04 PROCEDURE — 87086 URINE CULTURE/COLONY COUNT: CPT | Performed by: EMERGENCY MEDICINE

## 2023-03-04 PROCEDURE — 96372 THER/PROPH/DIAG INJ SC/IM: CPT | Performed by: EMERGENCY MEDICINE

## 2023-03-04 PROCEDURE — 250N000011 HC RX IP 250 OP 636: Performed by: EMERGENCY MEDICINE

## 2023-03-04 PROCEDURE — 81001 URINALYSIS AUTO W/SCOPE: CPT | Performed by: EMERGENCY MEDICINE

## 2023-03-04 PROCEDURE — 76870 US EXAM SCROTUM: CPT

## 2023-03-04 PROCEDURE — 250N000013 HC RX MED GY IP 250 OP 250 PS 637: Performed by: EMERGENCY MEDICINE

## 2023-03-04 PROCEDURE — 250N000009 HC RX 250: Performed by: EMERGENCY MEDICINE

## 2023-03-04 PROCEDURE — 99285 EMERGENCY DEPT VISIT HI MDM: CPT | Mod: 25

## 2023-03-04 RX ORDER — LEVOFLOXACIN 500 MG/1
500 TABLET, FILM COATED ORAL ONCE
Status: COMPLETED | OUTPATIENT
Start: 2023-03-04 | End: 2023-03-04

## 2023-03-04 RX ORDER — LEVOFLOXACIN 500 MG/1
500 TABLET, FILM COATED ORAL DAILY
Qty: 10 TABLET | Refills: 0 | Status: SHIPPED | OUTPATIENT
Start: 2023-03-04 | End: 2023-03-14

## 2023-03-04 RX ADMIN — LIDOCAINE HYDROCHLORIDE 1 G: 10 INJECTION, SOLUTION EPIDURAL; INFILTRATION; INTRACAUDAL; PERINEURAL at 19:30

## 2023-03-04 RX ADMIN — LEVOFLOXACIN 500 MG: 500 TABLET, FILM COATED ORAL at 19:30

## 2023-03-04 ASSESSMENT — ACTIVITIES OF DAILY LIVING (ADL): ADLS_ACUITY_SCORE: 35

## 2023-03-04 NOTE — ED PROVIDER NOTES
Radiology called with results.  Left testicle pain per tech.  Flow is increased on the left, suggestive of ependymitis orchitis.      Patient had previously reported right sided pain but when asked in triage and by US tech reports left sided pain.       Haley Cavanaugh MD  03/04/23 3504

## 2023-03-05 LAB — BACTERIA UR CULT: ABNORMAL

## 2023-03-05 NOTE — DISCHARGE INSTRUCTIONS
As we discussed, the ultrasound does show increased blood flow to both the epididymis and the testicle itself, which likely means it is dealing with an infection.  We gave you a dose of antibiotics here in the ER with a shot, but you do need to also take your oral antibiotics the next 10 days as well.  Please follow-up with your urologist, as we have included here in the paperwork in the next 1 week.  Come back to the ER with any other concerns you have, any fevers or with any new symptoms.  Please also let your primary care doctor know what is happened today

## 2023-03-05 NOTE — ED PROVIDER NOTES
History     Chief Complaint:  Testicular/scrotal Pain (/)       HPI   Alfa Cm is a 62 year old male with a history of a recently treated UTI that does not seem to be responding to nitrofurantoin who presents with left testicular pain and swelling that is been present for 3 days.  He states it is tender to palpation, swelling, and his pain is constant.  Denies any waxing and waning or colicky like symptoms, denies any vomiting, but does endorse significant pain.  States he was treated for a urine infection on and off since December and also had hematuria, but the hematuria has resolved.    No trauma, no discharge, no abnormal skin findings to his own penis only to the left testicle that seems to be swollen.      Independent Historian:   Spouse/Partner - They report Confirms the above history    Review of External Notes: Yes, appreciate history of hematuria and treatment with nitrofurantoin on and off with clinic    ROS:  Review of Systems   All other systems reviewed and are negative.      Allergies:  Pollen Extract     Medications:    levofloxacin (LEVAQUIN) 500 MG tablet  Aspirin 81 MG CAPS  atorvastatin (LIPITOR) 10 MG tablet  carvedilol (COREG) 25 MG tablet  fexofenadine (ALLEGRA) 180 MG tablet  Fluticasone-Umeclidin-Vilanterol (TRELEGY ELLIPTA) 100-62.5-25 MCG/INH oral inhaler  lisinopril (ZESTRIL) 20 MG tablet  multivitamin, therapeutic (THERA-VIT) TABS tablet  nicotine (NICODERM CQ) 14 MG/24HR 24 hr patch  nitroFURantoin macrocrystal-monohydrate (MACROBID) 100 MG capsule  QUEtiapine (SEROQUEL) 100 MG tablet  spironolactone (ALDACTONE) 25 MG tablet        Past Medical History:    Past Medical History:   Diagnosis Date     Alcohol abuse      Coronary artery disease involving native coronary artery without angina pectoris 2011     Hyperlipidemia      Hypertension      Myocardial infarction (H)      Substance abuse (H)      Tobacco abuse        Past Surgical History:    Past Surgical History:    Procedure Laterality Date     COLONOSCOPY N/A 2/1/2022    Procedure: COLONOSCOPY, FLEXIBLE, WITH LESION REMOVAL USING SNARE and biopsy forcep;  Surgeon: Mike Reid MD;  Location: [facility]     COLONOSCOPY N/A 2/1/2022    Procedure: COLONOSCOPY, WITH POLYPECTOMies AND BIOPSY;  Surgeon: Mike Reid MD;  Location: [facility]     IN PATIENT HAS A CORONARY ARTERY STENT          Family History:    family history includes Diabetes in his mother.    Social History:   reports that he has been smoking cigarettes. He has been smoking an average of .5 packs per day. He has never used smokeless tobacco. He reports that he does not currently use alcohol after a past usage of about 20.0 standard drinks per week. He reports that he does not use drugs.  PCP: Louisa Hurley     Physical Exam     Patient Vitals for the past 24 hrs:   BP Temp Temp src Pulse Resp SpO2   03/04/23 1454 94/56 98.4  F (36.9  C) Temporal 91 20 98 %        Physical Exam  Vitals: reviewed by me  General: Pt seen on [facility], cooperative, and alert to conversation  Eyes: Tracking well, clear conjunctiva BL  ENT: MMM, midline trachea.   Lungs: No tachypnea, no accessory muscle use. No respiratory distress.   CV: Rate as above  Abd: Soft, non tender, no guarding, no rebound. Non distended  Genitourinary exam, penis is unremarkable, no discharge or inflammation noted.  Nontender.  Left testicle is swollen and warm especially over the epididymis.  Testicle is in a vertical lie.  Right testicle unremarkable, also vertical lie, nontender, no swelling noted.  MSK: no joint effusion.  No evidence of trauma  Skin: No rash  Neuro: Clear speech and no facial droop.  Psych: Not RIS, no e/o AH/VH      Emergency Department Course       Imaging:  US Testicular & Scrotum w Doppler Ltd   Final Result   IMPRESSION:   1.  Hyperemia of the left testicle and epididymis, as compared to the right, can be seen with acute epididymoorchitis.   2.  Small to  moderate volume mildly complex left hydrocele.   3.  There are 2 right epididymal head cysts measuring up to 0.8 cm.      Findings were discussed with Dr. Cavanaugh at 4:15 PM on 03/04/2023.         Report per radiology    Laboratory:  Labs Ordered and Resulted from Time of ED Arrival to Time of ED Departure   ROUTINE UA WITH MICROSCOPIC REFLEX TO CULTURE - Abnormal       Result Value    Color Urine Yellow      Appearance Urine Slightly Cloudy (*)     Glucose Urine Negative      Bilirubin Urine Negative      Ketones Urine Negative      Specific Gravity Urine 1.016      Blood Urine Small (*)     pH Urine 6.0      Protein Albumin Urine 30 (*)     Urobilinogen Urine 4.0 (*)     Nitrite Urine Negative      Leukocyte Esterase Urine Large (*)     Bacteria Urine Few (*)     WBC Clumps Urine Present (*)     Mucus Urine Present (*)     Amorphous Crystals Urine Few (*)     RBC Urine 5 (*)     WBC Urine >182 (*)     Squamous Epithelials Urine <1     URINE CULTURE   URINE CULTURE          Emergency Department Course & Assessments:      Interventions:  Medications   cefTRIAXone (ROCEPHIN) 1 g in lidocaine injection (has no administration in time range)   levofloxacin (LEVAQUIN) tablet 500 mg (has no administration in time range)         Disposition:  The patient was discharged to home.     Impression & Plan      Medical Decision Making:  This is a pleasant 62-year-old male who presents emergency room with what appears to be epididymal orchitis.  Does not like he had hematuria and bladder thickening as well as recent course of nitrofurantoin that was prolonged, but does not seem to be helping with symptoms.  Therefore we have broadened coverage per up-to-date, he will get 1 shot of ceftriaxone here, and go home with levofloxacin for 10 days, and I have asked him to follow with his urologist or our own [facility] urology team in the next 1 week.  He has no abdominal pain here, no evidence of overwhelming sepsis, symptoms are localized  to left testicle.  He also has a hydrocele for which need to see urology as well.  All of his questions were answered, red flags when to come back to the ER were discussed in detail, chemical profile and ultrasound findings not consistent with torsion, and we have a more compelling alternative diagnosis that we are treating    Diagnosis:    ICD-10-CM    1. Epididymoorchitis  N45.3       2. Hydrocele, unspecified hydrocele type  N43.3            Discharge Medications:  Discharge Medication List as of 3/4/2023  7:24 PM      START taking these medications    Details   levofloxacin (LEVAQUIN) 500 MG tablet Take 1 tablet (500 mg) by mouth daily for 10 days, Disp-10 tablet, R-0, Local Print                  3/4/2023   No att. providers found        Darshan Martins MD  03/04/23 6678

## 2023-03-06 LAB — BACTERIA UR CULT: NORMAL

## 2023-03-16 ENCOUNTER — OFFICE VISIT (OUTPATIENT)
Dept: FAMILY MEDICINE | Facility: CLINIC | Age: 63
End: 2023-03-16
Payer: COMMERCIAL

## 2023-03-16 VITALS
DIASTOLIC BLOOD PRESSURE: 66 MMHG | BODY MASS INDEX: 26.68 KG/M2 | HEIGHT: 67 IN | TEMPERATURE: 97.9 F | OXYGEN SATURATION: 98 % | RESPIRATION RATE: 18 BRPM | HEART RATE: 64 BPM | SYSTOLIC BLOOD PRESSURE: 100 MMHG | WEIGHT: 170 LBS

## 2023-03-16 DIAGNOSIS — I50.20 HEART FAILURE WITH REDUCED EJECTION FRACTION, NYHA CLASS III (H): ICD-10-CM

## 2023-03-16 DIAGNOSIS — I50.42 CHRONIC COMBINED SYSTOLIC AND DIASTOLIC CONGESTIVE HEART FAILURE (H): ICD-10-CM

## 2023-03-16 DIAGNOSIS — Z00.00 MEDICARE ANNUAL WELLNESS VISIT, SUBSEQUENT: Primary | ICD-10-CM

## 2023-03-16 DIAGNOSIS — E78.5 HYPERLIPIDEMIA LDL GOAL <130: ICD-10-CM

## 2023-03-16 PROBLEM — E66.01 MORBID OBESITY (H): Status: RESOLVED | Noted: 2022-12-16 | Resolved: 2023-03-16

## 2023-03-16 PROCEDURE — G0438 PPPS, INITIAL VISIT: HCPCS | Performed by: FAMILY MEDICINE

## 2023-03-16 PROCEDURE — 99213 OFFICE O/P EST LOW 20 MIN: CPT | Mod: 25 | Performed by: FAMILY MEDICINE

## 2023-03-16 ASSESSMENT — ENCOUNTER SYMPTOMS
AGITATION: 0
COUGH: 0
ACTIVITY CHANGE: 0
SHORTNESS OF BREATH: 0
BACK PAIN: 0
ARTHRALGIAS: 0
APPETITE CHANGE: 0
DIZZINESS: 0
FACIAL ASYMMETRY: 0

## 2023-03-16 NOTE — PROGRESS NOTES
Assessment & Plan     Medicare annual wellness visit, subsequent  Discussed healthy eating   Discussed maintaining ideal weight   Discussed regular exercise .    Morbid obesity (H)  Discussed healthy eating   Discussed maintaining ideal weight   Discussed regular exercise .    Chronic combined systolic and diastolic congestive heart failure (H    Heart failure with reduced ejection fraction, NYHA class III (H)  Following with cardiology   Missed appointment with EP   Optimized on medication   Denies any leg edema     Will monitor .    Hyperlipidemia LDL goal <130  - Comprehensive metabolic panel (BMP + Alb, Alk Phos, ALT, AST, Total. Bili, TP)  - Lipid panel reflex to direct LDL Fasting      Not ready for quitting smoking   Self help discussed .    Return in about 6 months (around 9/16/2023) for Follow up.    Louisa Hurley MD  [facility] ALESSANDRO Grimm is a 62 year old accompanied by his, presenting for the following health issues:  Heart Problem (Follow up CAD) and Wellness Visit      Heart Problem  Pertinent negatives include no arthralgias, congestion or coughing.   History of Present Illness       Reason for visit:  Physical    He eats 2-3 servings of fruits and vegetables daily.He consumes 3 sweetened beverage(s) daily.He exercises with enough effort to increase his heart rate 10 to 19 minutes per day.  He exercises with enough effort to increase his heart rate 5 days per week.   He is taking medications regularly.       Annual Wellness Visit    Patient has been advised of split billing requirements and indicates understanding: Yes     Are you in the first 12 months of your Medicare Part B coverage?  No    Physical Health:    In general, how would you rate your overall physical health? fair    Outside of work, how many days during the week do you exercise?6-7 days/week    Outside of work, approximately how many minutes a day do you exercise?greater than 60 minutes    If you drink  "alcohol do you typically have >3 drinks per day or >7 drinks per week? No    Do you usually eat at least 4 servings of fruit and vegetables a day, include whole grains & fiber and avoid regularly eating high fat or \"junk\" foods? No    Do you have any problems taking medications regularly? No    Do you have any side effects from medications? none    Needs assistance for the following daily activities: no assistance needed    Which of the following safety concerns are present in your home?  throw rugs in the hallway     Hearing impairment: No    In the past 6 months, have you been bothered by leaking of urine? no    Mental Health:    In general, how would you rate your overall mental or emotional health? excellent  PHQ-2 Score:      Do you feel safe in your environment? Yes    Have you ever done Advance Care Planning? (For example, a Health Directive, POLST, or a discussion with a medical provider or your loved ones about your wishes)? Yes, advance care planning is on file.    Fall risk:  Fallen 2 or more times in the past year?: No  Any fall with injury in the past year?: No    Cognitive Screenin) Repeat 3 items (Leader, Season, Table)  Yes   2) Clock draw: NORMAL  3) 3 item recall: Recalls 2 objects   Results: NORMAL clock, 1-2 items recalled: COGNITIVE IMPAIRMENT LESS LIKELY    Mini-CogTM Copyright CARLEE Zuleta. Licensed by the author for use in [facility]; reprinted with permission (phylicia@.[facility]). All rights reserved.      Do you have sleep apnea, excessive snoring or daytime drowsiness?: yes- nothing new    Current providers sharing in care for this patient include:   Patient Care Team:  Louisa Hurley MD as PCP - General (Family Medicine)  Louisa Hurley MD as Assigned PCP  Vicente Reaves MD as MD (Cardiovascular Disease)  Vicente Reaves MD as Assigned Heart and Vascular Provider  Jeremiah Prieto MD as Assigned Surgical Provider    Patient has been advised of split billing requirements " "and indicates understanding: Yes      Review of Systems   Constitutional: Negative for activity change and appetite change.   HENT: Negative for congestion and dental problem.    Respiratory: Negative for cough and shortness of breath.    Musculoskeletal: Negative for arthralgias and back pain.   Neurological: Negative for dizziness and facial asymmetry.   Psychiatric/Behavioral: Negative for agitation and behavioral problems.            Objective    /66   Pulse 64   Temp 97.9  F (36.6  C) (Oral)   Resp 18   Ht 1.702 m (5' 7\")   Wt 77.1 kg (170 lb)   SpO2 98%   BMI 26.63 kg/m    Body mass index is 26.63 kg/m .  Physical Exam  Vitals and nursing note reviewed.   HENT:      Head: Normocephalic.      Right Ear: Tympanic membrane normal.      Nose: Nose normal.      Mouth/Throat:      Mouth: Mucous membranes are moist.   Eyes:      Pupils: Pupils are equal, round, and reactive to light.   Cardiovascular:      Rate and Rhythm: Normal rate.   Pulmonary:      Effort: Pulmonary effort is normal.   Abdominal:      General: Abdomen is flat.   Musculoskeletal:         General: Normal range of motion.      Cervical back: Normal range of motion.   Skin:     General: Skin is warm.   Neurological:      General: No focal deficit present.      Mental Status: He is alert.   Psychiatric:         Mood and Affect: Mood normal.                "

## 2023-03-16 NOTE — Clinical Note
Multiple uti , multiple er visit  Plan for cystoscopy to rule out .  Following with cariology possble pace maker . Non COMPLAINT. Will be 3 for now .

## 2023-03-31 ENCOUNTER — LAB (OUTPATIENT)
Dept: LAB | Facility: CLINIC | Age: 63
End: 2023-03-31
Payer: COMMERCIAL

## 2023-03-31 DIAGNOSIS — N39.0 UTI (URINARY TRACT INFECTION): ICD-10-CM

## 2023-03-31 LAB
ALBUMIN UR-MCNC: NEGATIVE MG/DL
APPEARANCE UR: CLEAR
BILIRUB UR QL STRIP: NEGATIVE
COLOR UR AUTO: YELLOW
GLUCOSE UR STRIP-MCNC: NEGATIVE MG/DL
HGB UR QL STRIP: ABNORMAL
KETONES UR STRIP-MCNC: NEGATIVE MG/DL
LEUKOCYTE ESTERASE UR QL STRIP: NEGATIVE
NITRATE UR QL: NEGATIVE
PH UR STRIP: 6 [PH] (ref 5–7)
SP GR UR STRIP: 1.01 (ref 1–1.03)
UROBILINOGEN UR STRIP-ACNC: 0.2 E.U./DL

## 2023-03-31 PROCEDURE — 87086 URINE CULTURE/COLONY COUNT: CPT

## 2023-03-31 PROCEDURE — 81003 URINALYSIS AUTO W/O SCOPE: CPT | Mod: QW

## 2023-04-02 LAB — BACTERIA UR CULT: NORMAL

## 2023-04-21 ENCOUNTER — TELEPHONE (OUTPATIENT)
Dept: CARDIOLOGY | Facility: CLINIC | Age: 63
End: 2023-04-21
Payer: COMMERCIAL

## 2023-04-21 NOTE — TELEPHONE ENCOUNTER
Left message for patient to discuss appt on 4/24.  He will need to come in for appt. Sent my chart message to patient as well informing him he needs to change appt.  MADELYN Miller

## 2023-05-30 DIAGNOSIS — G47.00 PERSISTENT INSOMNIA: ICD-10-CM

## 2023-05-30 DIAGNOSIS — F41.9 CHRONIC ANXIETY: ICD-10-CM

## 2023-05-30 RX ORDER — QUETIAPINE FUMARATE 100 MG/1
100 TABLET, FILM COATED ORAL AT BEDTIME
Qty: 90 TABLET | Refills: 0 | Status: SHIPPED | OUTPATIENT
Start: 2023-05-30 | End: 2023-08-31

## 2023-05-30 NOTE — TELEPHONE ENCOUNTER
Prescription approved per [facility] Refill Protocol.  Alexandra MAJANO RN, BSN

## 2023-06-09 ENCOUNTER — MYC MEDICAL ADVICE (OUTPATIENT)
Dept: FAMILY MEDICINE | Facility: CLINIC | Age: 63
End: 2023-06-09
Payer: COMMERCIAL

## 2023-06-13 NOTE — TELEPHONE ENCOUNTER
ANTONIA for call back -  See my chart     Pt has not scheduled with cardiology     Lianne Sanchez RN

## 2023-06-21 NOTE — TELEPHONE ENCOUNTER
LM again for call back pt has read my chart about scheduling with specialist    Lianne Sanchez RN

## 2023-08-04 ENCOUNTER — OFFICE VISIT (OUTPATIENT)
Dept: FAMILY MEDICINE | Facility: CLINIC | Age: 63
End: 2023-08-04
Payer: COMMERCIAL

## 2023-08-04 ENCOUNTER — TELEPHONE (OUTPATIENT)
Dept: FAMILY MEDICINE | Facility: CLINIC | Age: 63
End: 2023-08-04

## 2023-08-04 VITALS
HEART RATE: 69 BPM | TEMPERATURE: 98.2 F | SYSTOLIC BLOOD PRESSURE: 112 MMHG | WEIGHT: 157.6 LBS | OXYGEN SATURATION: 97 % | DIASTOLIC BLOOD PRESSURE: 68 MMHG | RESPIRATION RATE: 18 BRPM | BODY MASS INDEX: 23.89 KG/M2 | HEIGHT: 68 IN

## 2023-08-04 DIAGNOSIS — J44.9 CHRONIC OBSTRUCTIVE PULMONARY DISEASE, UNSPECIFIED COPD TYPE (H): Primary | ICD-10-CM

## 2023-08-04 DIAGNOSIS — I25.10 CORONARY ARTERY DISEASE INVOLVING NATIVE CORONARY ARTERY OF NATIVE HEART WITHOUT ANGINA PECTORIS: ICD-10-CM

## 2023-08-04 PROCEDURE — 99213 OFFICE O/P EST LOW 20 MIN: CPT | Performed by: NURSE PRACTITIONER

## 2023-08-04 ASSESSMENT — PAIN SCALES - GENERAL: PAINLEVEL: NO PAIN (0)

## 2023-08-04 NOTE — TELEPHONE ENCOUNTER
Patient has Unemployment papers that he is requesting to have filled out.      He states the forms  faxed 2 days ago.-advised  do not see if system  Patient has not seen his pcp for concerns pertaining to unemployment.      Patient will bring forms with to appointment.     Advised a visit is needed to fill paperwork.   Scheduled patient for today, advised of LV location, arrival and appointment time.     Stephania KIMBROUGH RN   [facility] Triage

## 2023-08-04 NOTE — PROGRESS NOTES
Assessment & Plan     Chronic obstructive pulmonary disease, unspecified COPD type (H)  Form completed and returned to Alfa, form also faxed and copy made to place in chart:  Restricted hours of work to 5 hours per day, 25 hours per week, limit standing to 3 hours per day.    Coronary artery disease involving native coronary artery of native heart without angina pectoris         FUTURE APPOINTMENTS:       - Follow-up visit on 9/18 with PCP    Susan Haase, APRN CNP [facility]    Subjective   Alfa is a 63 year old, presenting for the following health issues:  Forms (Unemployment Paperwork)        8/4/2023     2:08 PM   Additional Questions   Roomed by LETICIA Orellana   Accompanied by Self       History of Present Illness       Reason for visit:  Medical paper signed    He eats 0-1 servings of fruits and vegetables daily.He consumes 2 sweetened beverage(s) daily.He exercises with enough effort to increase his heart rate 9 or less minutes per day.    He is taking medications regularly.     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  Due to the heat, odor he was not able to tolerate the job due to his COPD and CAD, he had increased cough, wheezing, shortness of breath, VITAL.  He has a letter today that he would like completed that would restrict his hours of work and level of activity.    He reports that he is on long term disability and plans to work as a  in the fall when school starts.      Review of Systems   CONSTITUTIONAL: NEGATIVE for fever, chills, change in weight  ENT/MOUTH: NEGATIVE for ear, mouth and throat problems  RESP: NEGATIVE for significant cough or SOB  CV: NEGATIVE for chest pain, palpitations or peripheral edema  NEURO: NEGATIVE for weakness, dizziness or paresthesias      Objective    /68 (BP Location: Right arm, Patient Position: Sitting, Cuff Size:  "Adult Regular)   Pulse 69   Temp 98.2  F (36.8  C) (Oral)   Resp 18   Ht 1.715 m (5' 7.5\")   Wt 71.5 kg (157 lb 9.6 oz)   SpO2 97%   BMI 24.32 kg/m    Body mass index is 24.32 kg/m .  Physical Exam   GENERAL: healthy, alert and no distress  NECK: no adenopathy, no asymmetry, masses, or scars and thyroid normal to palpation  RESP: lungs clear to auscultation - no rales, rhonchi or wheezes  CV: regular rate and rhythm, normal S1 S2,  no peripheral edema   NEURO:  mentation intact and speech normal                  "

## 2023-08-18 DIAGNOSIS — E78.5 HYPERLIPIDEMIA: Primary | ICD-10-CM

## 2023-08-18 RX ORDER — ATORVASTATIN CALCIUM 10 MG/1
10 TABLET, FILM COATED ORAL DAILY
Qty: 30 TABLET | Refills: 0 | Status: SHIPPED | OUTPATIENT
Start: 2023-08-18 | End: 2024-01-19

## 2023-08-25 ENCOUNTER — OFFICE VISIT (OUTPATIENT)
Dept: CARDIOLOGY | Facility: CLINIC | Age: 63
End: 2023-08-25
Payer: COMMERCIAL

## 2023-08-25 VITALS
HEIGHT: 67 IN | OXYGEN SATURATION: 98 % | BODY MASS INDEX: 26.06 KG/M2 | DIASTOLIC BLOOD PRESSURE: 87 MMHG | HEART RATE: 66 BPM | WEIGHT: 166 LBS | SYSTOLIC BLOOD PRESSURE: 147 MMHG

## 2023-08-25 DIAGNOSIS — I21.11 ST ELEVATION MYOCARDIAL INFARCTION INVOLVING RIGHT CORONARY ARTERY (H): Primary | ICD-10-CM

## 2023-08-25 DIAGNOSIS — E78.5 HYPERLIPIDEMIA: ICD-10-CM

## 2023-08-25 DIAGNOSIS — Z00.6 EXAMINATION OF PARTICIPANT IN CLINICAL TRIAL: ICD-10-CM

## 2023-08-25 PROCEDURE — 99207 PR NO CHARGE-RESEARCH SERVICE: CPT

## 2023-08-25 NOTE — PROGRESS NOTES
A Double-blind, Randomized, Placebo-controlled, Multicenter Study Assessing the impact of Olpasiran on Major Cardiovascular Events in Patients with Atherosclerotic cardiovascular Disease and Elevated Lipoprotein (a)    Alfa Cm was seen in clinic today for the screening visit.    Research nurse met with subject to discuss consent and participation in the above noted study.    The study discussion included the following:   Study purpose  Qualifications for participation  Length of study participation  Study procedures  Risks and side effects  Benefits (if any)  Voluntary nature of participation  Alternatives to participation  Confidentiality of records  Financial considerations     Subject asked questions and agreed that he received answers that satisfied him.    Consent form [Version 08JUN 2023 - Advarra version [id number]] signed on 08/25/2013 . Patient verbalized understanding. A signed copy was given to the subject & forwarded to medical records. No study procedures were done prior to obtaining informed consent.      Did Subject meet all inclusion criteria? Pending Central Labs  Did Subject meet any Exclusion criteria? No      MRS - 0    Central Labs obtained at 10:50 and sent to central lab  Serum Pregnancy for WOCBP NA    Past Medical History:   Diagnosis Date    Alcohol abuse     Coronary artery disease involving native coronary artery without angina pectoris 2011    angiplasty, stent placed at [facility]    Hyperlipidemia     Hypertension     Myocardial infarction (H)     Substance abuse (H)     Tobacco abuse        Current Outpatient Medications:     Aspirin 81 MG CAPS, Take 81 mg by mouth daily, Disp: 90 capsule, Rfl: 1    atorvastatin (LIPITOR) 10 MG tablet, Take 1 tablet (10 mg) by mouth daily, Disp: 30 tablet, Rfl: 0    carvedilol (COREG) 25 MG tablet, Take 1 tablet (25 mg) by mouth 2 times daily (with meals), Disp: 180 tablet, Rfl: 0    Fluticasone-Umeclidin-Vilanterol (TRELEGY  "ELLIPTA) 100-62.5-25 MCG/INH oral inhaler, Inhale 1 puff into the lungs daily, Disp: 60 each, Rfl: 3    lisinopril (ZESTRIL) 20 MG tablet, Take 1 tablet (20 mg) by mouth daily, Disp: 90 tablet, Rfl: 1    multivitamin, therapeutic (THERA-VIT) TABS tablet, Take 1 tablet by mouth daily, Disp: , Rfl:     QUEtiapine (SEROQUEL) 100 MG tablet, Take 1 tablet (100 mg) by mouth At Bedtime, Disp: 90 tablet, Rfl: 0    spironolactone (ALDACTONE) 25 MG tablet, Take 1 tablet (25 mg) by mouth daily, Disp: 90 tablet, Rfl: 3     Vitals:    08/25/23 0957   BP: (!) 147/87   BP Location: Left arm   Patient Position: Supine   Cuff Size: Adult Regular   Pulse: 66   SpO2: 98%   Weight: 75.3 kg (166 lb)   Height: 1.702 m (5' 7\")      Patient reports not having taken his am meds today.    Hips Circumference 96 cm  Waist Circumference 94 cm       Demographics     [x]Not  or   [] or    []Not Reported     [] Unknown         Race:   [] or    []   [x]Black or    [] or Other   []White   []Other, specify       .      Tiffany Boyce RN   "

## 2023-08-25 NOTE — LETTER
8/25/2023    Louisa Hurley MD  [address]  [facility] [address]    RE: Alfa PEÑALOZA Soila       Dear Colleague,     I had the pleasure of seeing Alfa Cm in the [facility].    A Double-blind, Randomized, Placebo-controlled, Multicenter Study Assessing the impact of Olpasiran on Major Cardiovascular Events in Patients with Atherosclerotic cardiovascular Disease and Elevated Lipoprotein (a)    Alfa Cm was seen in clinic today for the screening visit.    Research nurse met with subject to discuss consent and participation in the above noted study.    The study discussion included the following:   Study purpose  Qualifications for participation  Length of study participation  Study procedures  Risks and side effects  Benefits (if any)  Voluntary nature of participation  Alternatives to participation  Confidentiality of records  Financial considerations     Subject asked questions and agreed that he received answers that satisfied him.    Consent form [Version 08JUN 2023 - Advarra version [id number]] signed on 08/25/2013 . Patient verbalized understanding. A signed copy was given to the subject & forwarded to medical records. No study procedures were done prior to obtaining informed consent.      Did Subject meet all inclusion criteria? Pending Central Labs  Did Subject meet any Exclusion criteria? No    Central Labs obtained at 10:50 and sent to central lab  Serum Pregnancy for WOCBP NA    Past Medical History:   Diagnosis Date    Alcohol abuse     Coronary artery disease involving native coronary artery without angina pectoris 2011    angiplasty, stent placed at [facility]    Hyperlipidemia     Hypertension     Myocardial infarction (H)     Substance abuse (H)     Tobacco abuse        Current Outpatient Medications:     Aspirin 81 MG CAPS, Take 81 mg by mouth daily, Disp: 90 capsule, Rfl: 1    atorvastatin (LIPITOR) 10 MG tablet, Take 1 tablet (10 mg) by mouth daily,  "Disp: 30 tablet, Rfl: 0    carvedilol (COREG) 25 MG tablet, Take 1 tablet (25 mg) by mouth 2 times daily (with meals), Disp: 180 tablet, Rfl: 0    Fluticasone-Umeclidin-Vilanterol (TRELEGY ELLIPTA) 100-62.5-25 MCG/INH oral inhaler, Inhale 1 puff into the lungs daily, Disp: 60 each, Rfl: 3    lisinopril (ZESTRIL) 20 MG tablet, Take 1 tablet (20 mg) by mouth daily, Disp: 90 tablet, Rfl: 1    multivitamin, therapeutic (THERA-VIT) TABS tablet, Take 1 tablet by mouth daily, Disp: , Rfl:     QUEtiapine (SEROQUEL) 100 MG tablet, Take 1 tablet (100 mg) by mouth At Bedtime, Disp: 90 tablet, Rfl: 0    spironolactone (ALDACTONE) 25 MG tablet, Take 1 tablet (25 mg) by mouth daily, Disp: 90 tablet, Rfl: 3     Vitals:    08/25/23 0957   BP: (!) 147/87   BP Location: Left arm   Patient Position: Supine   Cuff Size: Adult Regular   Pulse: 66   SpO2: 98%   Weight: 75.3 kg (166 lb)   Height: 1.702 m (5' 7\")      Patient reports not having taken his am meds today.    Hips Circumference 96 cm  Waist Circumference 94 cm       Demographics     [x]Not  or   [] or    []Not Reported     [] Unknown         Race:   [] or    []   [x]Black or    [] or Other   []White   []Other, specify       .      Tiffany Boyce RN       Thank you for allowing me to participate in the care of your patient.      Sincerely,     Tiffany Boyce RN     [facility]  cc:   No referring provider defined for this encounter.      "

## 2023-08-31 DIAGNOSIS — F41.9 CHRONIC ANXIETY: ICD-10-CM

## 2023-08-31 DIAGNOSIS — G47.00 PERSISTENT INSOMNIA: ICD-10-CM

## 2023-09-01 RX ORDER — QUETIAPINE FUMARATE 100 MG/1
100 TABLET, FILM COATED ORAL AT BEDTIME
Qty: 30 TABLET | Refills: 0 | Status: SHIPPED | OUTPATIENT
Start: 2023-09-01 | End: 2023-09-25

## 2023-09-06 ENCOUNTER — DOCUMENTATION ONLY (OUTPATIENT)
Dept: CARDIOLOGY | Facility: CLINIC | Age: 63
End: 2023-09-06
Payer: COMMERCIAL

## 2023-09-06 DIAGNOSIS — E78.5 HYPERLIPIDEMIA: ICD-10-CM

## 2023-09-06 DIAGNOSIS — Z00.6 EXAMINATION OF PARTICIPANT IN CLINICAL TRIAL: ICD-10-CM

## 2023-09-06 DIAGNOSIS — I21.11 ST ELEVATION MYOCARDIAL INFARCTION INVOLVING RIGHT CORONARY ARTERY (H): Primary | ICD-10-CM

## 2023-09-06 PROCEDURE — 99207 PR NO CHARGE-RESEARCH SERVICE: CPT | Performed by: INTERNAL MEDICINE

## 2023-09-06 NOTE — PROGRESS NOTES
A Double-blind, Randomized, Placebo-controlled, Multicenter Study Assessing the impact of Olpasiran on Major Cardiovascular Events in Patients with Atherosclerotic cardiovascular Disease and Elevated Lipoprotein (a)    Dr Padgett to review      Olpasiran-CVOT INCLUSION/EXCLUSION CRITERIA     Criteria #   Inclusion Criteria: All must be answered  yes  to be eligible for study   Yes/No   101   Subject has provided informed consent prior to initiation of any study specific activities/procedures    Yes   102 Age 18 to 85 years (or locally applicable legal adult age, whichever is older) at signing of informed consent    Yes   103 Lp (a) ? 200 nmol/L during screening by central laboratory       Lp (a) to be assessed after > 2 weeks of stable, optimized lipid-lowering therapy    Yes   104 History of ASCVD as evidenced by history of either:    Myocardial infarction (MI) (presumed type 1 event due to plaque rupture)     and/or   Coronary revascularization by percutaneous coronary intervention (PCI) with stenting AND any of the following:    Age ? 65 years    Residual coronary artery stenosis >50% in any major vessel (including major branches)   Multivessel PCI (ie, ? 2 distinct vessels including branch arteries)   Symptomatic peripheral arterial disease with either (a) intermittent claudication with ankle-brachial index (TANA) < 0.85, or (b) peripheral arterial revascularization or amputation due to atherosclerotic disease   Ischemic stroke (presumed atherosclerotic in origin)   Diabetes mellitus (type 1 or type 2)      Yes             EXCLUSION CRITERIA  Disease Related  201 Severe renal dysfunction, defined as an estimated glomerular filtration rate (eGFR) < 30 mL/min/1.73 m2 by central laboratory during screening    No   202 Active liver disease, known hepatitis, or any hepatic dysfunction, defined as aspartate aminotransferase (AST) or alanine aminotransferase (ALT) > 3 x upper limit of normal (ULN), or total bilirubin  (TBL) > 2 x ULN during screening    No   203 History of hemorrhagic stroke    No   204 History of major bleeding disorder (for example: hemophilia, von Willebrand disease, coagulation disorders, blood clotting disorders, clotting factor deficiencies, etc.)    No   205 Major cardiovascular event (eg, myocardial infarction, unstable angina, PCI, coronary artery bypass graft, or stroke) within 4 weeks prior to Lp(a) screening or during screening    No   206  Planned cardiac surgery or arterial revascularization    No     Other Medical Condition  207 Malignancy (except non-melanoma skin cancers, cervical in-situ carcinoma, breast ductal carcinoma in situ, or stage 1 prostate carcinoma) within the last 5 years prior to study day 1    No   208 Severe heart failure (New York Heart Association Functional Classification IV) or last known left ventricular ejection fraction < 30%    No   209 Uncontrolled or recurrent ventricular tachycardia in the past 3 months prior to study day 1    No   210 Atrial fibrillation or flutter not on therapeutic anticoagulation or having had placement of left atrial appendage closure device    No   211 Uncontrolled hypertension at screening, defined as systolic blood pressure of > 180 mmHg or diastolic blood pressure of >110 mmHg at rest despite antihypertensive therapy    No   212 Fasting triglycerides > 400 mg/dL (4.5 mmol/L) during screening    No   213 Poorly controlled diabetes mellitus (type 1 or type 2), defined as HbA1c > 10% by central laboratory at screening    No   214 Known major active infection, or a chronic disease or infection (eg, human immunodeficiency virus) that is not currently stable and appropriately managed in the judgment of the investigator    No     Prior/Concomitant Therapy  215 Current or planned lipoprotein apheresis or < 3 months since last apheresis treatment prior to study day 1    No   216 Subject has taken lomitapide in the last 12 months prior to study day 1     No   217 Changes in lipid-lowering therapy within 4 weeks prior to day 1 or between Lp(a) screening and day 1 (see Sections 6.1.6 and 6.7.2 for concomitant therapy requirements during study participation)    No   218 Previously received olpasiran or pelacarsen    No     Prior/Concurrent Clinical Study Experience  219 Currently receiving treatment in another investigational device or drug study, or less than 30 days since ending treatment on another investigational device or drug study(ies).  Other investigational procedures while participating in this study are excluded.   No         Other Exclusions  220 Female subjects of childbearing potential unwilling to use protocol specified method of contraception see Appendix 5 (Section 11.5) during treatment and for an additional 90 days after the last dose of investigational product.    No   221 Female subjects who are breastfeeding or who plan to breastfeed while on study through 90 days after the last dose of investigational product.    No   222 Female subjects planning to become pregnant while on study through 90 days after the last dose of investigational product.    No   223 Female subjects of childbearing potential with a positive pregnancy test assessed at day 1 by a highly sensitive urine or serum pregnancy test.    No   224 Subject has known sensitivity to any of the products to be administered during dosing.    No   225 Subject likely to not be available to complete all protocol-required study visits or procedures, and/or to comply with all required study procedures (eg, Clinical Outcome Assessments) to the best of the subject and investigator's knowledge.    No   226 History or evidence of any other clinically significant disorder, condition or disease (with the exception of those outlined above) that, in the opinion of the investigator or [facility] physician, if consulted, would pose a risk to subject safety or interfere with the study evaluation, procedures, or  completion.      No     Patients LPa returned @ 249.00 nmol/L. Patient qualifies for the study. Patient has been notified and will be scheduled for randomization visit.     I have reviewed Alfa Cm's inclusions and exclusion criteria.   He has met all the inclusion criteria and none of the exclusion criteria.  They are ready to randomize in the Olpasiran CVOT ([facility] [id number]) trial.  Ky Padgett MD

## 2023-09-07 ENCOUNTER — TRANSFERRED RECORDS (OUTPATIENT)
Dept: CARDIOLOGY | Facility: CLINIC | Age: 63
End: 2023-09-07
Payer: COMMERCIAL

## 2023-09-13 DIAGNOSIS — Z00.6 EXAMINATION OF PARTICIPANT IN CLINICAL TRIAL: ICD-10-CM

## 2023-09-13 DIAGNOSIS — E78.5 HYPERLIPIDEMIA: ICD-10-CM

## 2023-09-13 DIAGNOSIS — E78.5 HYPERLIPIDEMIA: Primary | ICD-10-CM

## 2023-09-19 ENCOUNTER — TELEPHONE (OUTPATIENT)
Dept: FAMILY MEDICINE | Facility: CLINIC | Age: 63
End: 2023-09-19
Payer: COMMERCIAL

## 2023-09-19 NOTE — LETTER
September 19, 2023      Alfa Cm  [address]  [facility] [address]        Dear Alfa,     You missed your appointment with Dr Hurley on         Sincerely,        Louisa Hurley MD

## 2023-09-25 DIAGNOSIS — G47.00 PERSISTENT INSOMNIA: ICD-10-CM

## 2023-09-25 DIAGNOSIS — F41.9 CHRONIC ANXIETY: ICD-10-CM

## 2023-09-25 RX ORDER — QUETIAPINE FUMARATE 100 MG/1
100 TABLET, FILM COATED ORAL AT BEDTIME
Qty: 30 TABLET | Refills: 0 | Status: SHIPPED | OUTPATIENT
Start: 2023-09-25 | End: 2023-10-17

## 2023-10-17 DIAGNOSIS — G47.00 PERSISTENT INSOMNIA: ICD-10-CM

## 2023-10-17 DIAGNOSIS — F41.9 CHRONIC ANXIETY: ICD-10-CM

## 2023-10-17 RX ORDER — QUETIAPINE FUMARATE 100 MG/1
100 TABLET, FILM COATED ORAL AT BEDTIME
Qty: 30 TABLET | Refills: 0 | Status: SHIPPED | OUTPATIENT
Start: 2023-10-17 | End: 2023-11-06

## 2023-10-17 NOTE — LETTER
October 19, 2023      Alfa Cm  [address]  [facility] [address]        Alfa Cm      We recently received a refill request for your QUEtiapine (SEROQUEL) 100 MG tablet . We have sent in a refill for you to your pharmacy.    Our records show you are due for a follow up visit with your provider.     Please call the clinic at [phone] to schedule as the providers are booking out.        Sincerely,        Libby Hdz/

## 2023-10-18 ENCOUNTER — DOCUMENTATION ONLY (OUTPATIENT)
Dept: CARDIOLOGY | Facility: CLINIC | Age: 63
End: 2023-10-18

## 2023-10-18 DIAGNOSIS — I21.11 ST ELEVATION MYOCARDIAL INFARCTION INVOLVING RIGHT CORONARY ARTERY (H): ICD-10-CM

## 2023-10-18 DIAGNOSIS — Z00.6 EXAMINATION OF PARTICIPANT IN CLINICAL TRIAL: Primary | ICD-10-CM

## 2023-10-18 PROCEDURE — 99207 PR NO CHARGE-RESEARCH SERVICE: CPT | Performed by: INTERNAL MEDICINE

## 2023-10-18 NOTE — PROGRESS NOTES
Research labs from August 25 reviewed, these results are not clinically significant, this includes high RDW, high MCV, low MCHC, mildly high alkaline phosphatase, increased LP(a),  LF

## 2023-11-06 DIAGNOSIS — F41.9 CHRONIC ANXIETY: ICD-10-CM

## 2023-11-06 DIAGNOSIS — G47.00 PERSISTENT INSOMNIA: ICD-10-CM

## 2023-11-06 RX ORDER — QUETIAPINE FUMARATE 100 MG/1
100 TABLET, FILM COATED ORAL AT BEDTIME
Qty: 30 TABLET | Refills: 0 | Status: SHIPPED | OUTPATIENT
Start: 2023-11-06 | End: 2023-12-15

## 2023-11-15 ENCOUNTER — DOCUMENTATION ONLY (OUTPATIENT)
Dept: CARDIOLOGY | Facility: CLINIC | Age: 63
End: 2023-11-15
Payer: COMMERCIAL

## 2023-11-15 DIAGNOSIS — E78.5 HYPERLIPIDEMIA: ICD-10-CM

## 2023-11-15 DIAGNOSIS — Z00.6 EXAMINATION OF PARTICIPANT IN CLINICAL TRIAL: Primary | ICD-10-CM

## 2023-11-15 DIAGNOSIS — I21.11 ST ELEVATION MYOCARDIAL INFARCTION INVOLVING RIGHT CORONARY ARTERY (H): ICD-10-CM

## 2023-11-15 PROCEDURE — 99207 PR NO CHARGE-RESEARCH SERVICE: CPT | Performed by: INTERNAL MEDICINE

## 2023-12-11 ENCOUNTER — TELEPHONE (OUTPATIENT)
Dept: FAMILY MEDICINE | Facility: CLINIC | Age: 63
End: 2023-12-11
Payer: COMMERCIAL

## 2023-12-11 NOTE — LETTER
December 11, 2023      Alfa Cm  [address]  [facility] [address]        Dear Alfa,     You are due for in clinic follow up and lab work.  You can make an appointment through my chart or by calling the clinic at [phone].      Sincerely,        Louisa Hurley MD/amp

## 2023-12-15 DIAGNOSIS — F41.9 CHRONIC ANXIETY: ICD-10-CM

## 2023-12-15 DIAGNOSIS — G47.00 PERSISTENT INSOMNIA: ICD-10-CM

## 2023-12-15 NOTE — TELEPHONE ENCOUNTER
Medication Question or Refill    Contacts         Type Contact Phone/Fax    12/15/2023 04:18 PM CST Phone (Incoming) Alfa Cm (Self) [phone] (H)            What medication are you calling about (include dose and sig)?: quetiapine 100 mg     Preferred Pharmacy:[facility]/pharmacy [facility][id number] - [place] [facility] [address]  [facility]  [address] [facility] [address]  Phone: [phone] Fax: [phone]        Controlled Substance Agreement on file:   CSA -- Patient Level:    CSA: None found at the patient level.       Who prescribed the medication?: pcp    Do you need a refill? Yes    When did you use the medication last? N/A    Patient offered an appointment? No    Do you have any questions or concerns?  No      Could we send this information to you in [facility] or would you prefer to receive a phone call?:   Patient would prefer a phone call   Okay to leave a detailed message?: Yes at Cell number on file:    Telephone Information:   Mobile [phone]

## 2023-12-19 RX ORDER — QUETIAPINE FUMARATE 100 MG/1
100 TABLET, FILM COATED ORAL AT BEDTIME
Qty: 30 TABLET | Refills: 0 | Status: SHIPPED | OUTPATIENT
Start: 2023-12-19 | End: 2024-01-17

## 2024-01-11 NOTE — PROGRESS NOTES
Patient was scheduled for a randomization visit this am. I spoke with him yesterday and he stated he would be here today for the appt. He did not show up or call to say he would not be here for his appt. The clinic and myself called several times and did leave messages. Patient did not call back. He will not be randomized to the study. Will send patient a letter.

## 2024-01-15 ENCOUNTER — TELEPHONE (OUTPATIENT)
Dept: FAMILY MEDICINE | Facility: CLINIC | Age: 64
End: 2024-01-15
Payer: COMMERCIAL

## 2024-01-15 DIAGNOSIS — F41.9 CHRONIC ANXIETY: ICD-10-CM

## 2024-01-15 DIAGNOSIS — G47.00 PERSISTENT INSOMNIA: ICD-10-CM

## 2024-01-15 NOTE — LETTER
January 15, 2024      Alfa Cm  [address]  [facility] [address]        Dear Alfa,     We received a refill request for you Seroquel last month and Dr Hurley did give you a one time refill as you are over due for follow up.   You will need to be seen for on going refills.   We have been trying to reach you by phone and my chart to help you schedule an appointment.     You can make an appointment through my chart or by calling the clinic at [phone]       Sincerely,        [facility] Staff

## 2024-01-16 RX ORDER — QUETIAPINE FUMARATE 100 MG/1
100 TABLET, FILM COATED ORAL AT BEDTIME
Qty: 30 TABLET | Refills: 0 | OUTPATIENT
Start: 2024-01-16

## 2024-01-17 DIAGNOSIS — F41.9 CHRONIC ANXIETY: ICD-10-CM

## 2024-01-17 DIAGNOSIS — G47.00 PERSISTENT INSOMNIA: ICD-10-CM

## 2024-01-17 NOTE — TELEPHONE ENCOUNTER
Medication Question or Refill    Contacts         Type Contact Phone/Fax    01/17/2024 03:15 PM CST Phone (Incoming) Alfa Cm (Self) [phone] (H)            What medication are you calling about (include dose and sig)?: Seroquel    Preferred Pharmacy:  [facility]/pharmacy #[id number] - [place] - [address]  [facility]  [address]  [facility] [address]  Phone: [phone] Fax: [phone]    Controlled Substance Agreement on file:   CSA -- Patient Level:    CSA: None found at the patient level.       Who prescribed the medication?: Dr. Hurley    Do you need a refill? Yes    When did you use the medication last? Last night    Patient offered an appointment? Yes: Scheduled 1/19/2 but pt is out of medication now    Do you have any questions or concerns?  No      Could we send this information to you in [facility] or would you prefer to receive a phone call?:   Patient would prefer a phone call   Okay to leave a detailed message?: Yes at Home number on file [phone] (home)

## 2024-01-18 RX ORDER — QUETIAPINE FUMARATE 100 MG/1
100 TABLET, FILM COATED ORAL AT BEDTIME
Qty: 30 TABLET | Refills: 0 | Status: SHIPPED | OUTPATIENT
Start: 2024-01-18 | End: 2024-01-19

## 2024-01-19 ENCOUNTER — TELEPHONE (OUTPATIENT)
Dept: FAMILY MEDICINE | Facility: CLINIC | Age: 64
End: 2024-01-19

## 2024-01-19 ENCOUNTER — VIRTUAL VISIT (OUTPATIENT)
Dept: FAMILY MEDICINE | Facility: CLINIC | Age: 64
End: 2024-01-19
Payer: COMMERCIAL

## 2024-01-19 DIAGNOSIS — Z12.5 SCREENING FOR PROSTATE CANCER: ICD-10-CM

## 2024-01-19 DIAGNOSIS — E78.5 HYPERLIPIDEMIA, UNSPECIFIED HYPERLIPIDEMIA TYPE: ICD-10-CM

## 2024-01-19 DIAGNOSIS — I10 BENIGN ESSENTIAL HYPERTENSION: ICD-10-CM

## 2024-01-19 DIAGNOSIS — J44.9 CHRONIC OBSTRUCTIVE PULMONARY DISEASE, UNSPECIFIED COPD TYPE (H): ICD-10-CM

## 2024-01-19 DIAGNOSIS — I50.20 HEART FAILURE WITH REDUCED EJECTION FRACTION, NYHA CLASS III (H): ICD-10-CM

## 2024-01-19 DIAGNOSIS — G47.00 PERSISTENT INSOMNIA: ICD-10-CM

## 2024-01-19 DIAGNOSIS — F41.9 CHRONIC ANXIETY: ICD-10-CM

## 2024-01-19 DIAGNOSIS — I50.20 HEART FAILURE WITH REDUCED EJECTION FRACTION, NYHA CLASS II (H): ICD-10-CM

## 2024-01-19 DIAGNOSIS — F10.21 ALCOHOL DEPENDENCE IN REMISSION (H): ICD-10-CM

## 2024-01-19 DIAGNOSIS — I25.5 ISCHEMIC CARDIOMYOPATHY: ICD-10-CM

## 2024-01-19 PROCEDURE — 99214 OFFICE O/P EST MOD 30 MIN: CPT | Performed by: FAMILY MEDICINE

## 2024-01-19 NOTE — PROGRESS NOTES
"Alfa is a 63 year old who is being evaluated via a billable video visit.      How would you like to obtain your AVS? MyChart  If the video visit is dropped, the invitation should be resent by: Text to cell phone: [phone]  Will anyone else be joining your video visit? No        Assessment & Plan     (E78.5) Hyperlipidemia, unspecified hyperlipidemia type  Comment: Not seen in clinic for a while due for face to face visit .    Plan: recommend     (I50.20) Heart failure with reduced ejection fraction, NYHA class III (H)  Comment:   Plan: has not followed by with cardiology .  Discussed need face to face visit .  Declined any symptoms .    (G47.00) Persistent insomnia  Comment: noticed improvement with seroquel .    (F41.9) Chronic anxiety  Controlled on Seroquel recommend to     (I50.20) Heart failure with reduced ejection fraction, NYHA class II (H)  Comment: Discussed to follow up with cardiology   Patient denies any symptoms .  Discussed face to face visit .    (I25.5) Ischemic cardiomyopathy   Did not follow with cardiology   Recommend to keep follow up     (I10) Benign essential hypertension  Discussed importance to maintaining blood pressure at goal .      (J44.9) Chronic obstructive pulmonary disease, unspecified COPD type (H)  Patient added inhaler are working well.    (F10.21) Alcohol dependence in remission (H)  He is continuing to abstain from alcohol     (Z12.5) Screening for prostate cancer          Nicotine/Tobacco Cessation  He reports that he has been smoking cigarettes. He has been smoking an average of 0.3 packs per day. He has never used smokeless tobacco.  Nicotine/Tobacco Cessation Plan  Self help information given to patient      BMI  Estimated body mass index is 26 kg/m  as calculated from the following:    Height as of 8/25/23: 1.702 m (5' 7\").    Weight as of 8/25/23: 75.3 kg (166 lb).   Weight management plan: Discussed healthy diet and exercise guidelines        Subjective   Alfa is a 63 " "year old, presenting for the following health issues:  Recheck Medication (Requesting refills on his medications. Working well. No concerns. )        1/19/2024     3:56 PM   Additional Questions   Roomed by Jelly Mcfarlane CMA   Accompanied by Self     HPI     Medication Followup   Taking Medication as prescribed: yes  Side Effects:  None  Medication Helping Symptoms:  yes        Review of Systems  CONSTITUTIONAL: NEGATIVE for fever, chills, change in weight  ENT/MOUTH: NEGATIVE for ear, mouth and throat problems  RESP: NEGATIVE for significant cough or SOB  CV: NEGATIVE for chest pain, palpitations or peripheral edema      Objective    Vitals - Patient Reported  Weight (Patient Reported): 77.1 kg (170 lb)  Height (Patient Reported): 170.2 cm (5' 7\")  BMI (Based on Pt Reported Ht/Wt): 26.63        Physical Exam   GENERAL: alert and no distress  EYES: Eyes grossly normal to inspection.  No discharge or erythema, or obvious scleral/conjunctival abnormalities.  RESP: No audible wheeze, cough, or visible cyanosis.    SKIN: Visible skin clear. No significant rash, abnormal pigmentation or lesions.  NEURO: Cranial nerves grossly intact.  Mentation and speech appropriate for age.  PSYCH: Appropriate affect, tone, and pace of words        Video-Visit Details    Type of service:  Video Visit   Video Start Time: 3:56 PM  Video End Time:4:30 pm    Originating Location (pt. Location): Home    Distant Location (provider location):  On-site  Platform used for Video Visit: Snehal  Signed Electronically by: Louisa Hurley MD    "

## 2024-02-04 RX ORDER — QUETIAPINE FUMARATE 100 MG/1
100 TABLET, FILM COATED ORAL AT BEDTIME
Qty: 90 TABLET | Refills: 0 | Status: SHIPPED | OUTPATIENT
Start: 2024-02-04 | End: 2024-02-20

## 2024-02-04 RX ORDER — SPIRONOLACTONE 25 MG/1
25 TABLET ORAL DAILY
Qty: 90 TABLET | Refills: 0 | Status: SHIPPED | OUTPATIENT
Start: 2024-02-04 | End: 2024-02-20

## 2024-02-04 RX ORDER — ATORVASTATIN CALCIUM 10 MG/1
10 TABLET, FILM COATED ORAL DAILY
Qty: 90 TABLET | Refills: 0 | Status: SHIPPED | OUTPATIENT
Start: 2024-02-04 | End: 2024-02-20

## 2024-02-04 RX ORDER — CARVEDILOL 25 MG/1
25 TABLET ORAL 2 TIMES DAILY WITH MEALS
Qty: 180 TABLET | Refills: 0 | Status: SHIPPED | OUTPATIENT
Start: 2024-02-04 | End: 2024-02-20

## 2024-02-04 RX ORDER — LISINOPRIL 20 MG/1
20 TABLET ORAL DAILY
Qty: 90 TABLET | Refills: 0 | Status: SHIPPED | OUTPATIENT
Start: 2024-02-04 | End: 2024-02-20

## 2024-02-05 RX ORDER — RESPIRATORY SYNCYTIAL VIRUS VACCINE 120MCG/0.5
0.5 KIT INTRAMUSCULAR ONCE
Qty: 1 EACH | Refills: 0 | Status: CANCELLED | OUTPATIENT
Start: 2024-02-05 | End: 2024-02-05

## 2024-02-05 NOTE — CONFIDENTIAL NOTE
Pre-Visit Planning   Next 5 appointments (look out 90 days)      Feb 20, 2024  3:00 PM  (Arrive by 2:40 PM)  Adult Preventative Visit with Louisa Hurley MD  [facility] [address] [facility] [address] [phone]          Appointment Notes for this encounter: preventative exam flu shot and covid    Questionnaires Reviewed/AssignedNo additional questionnaires are needed    Patient preferred phone number: [phone]    Unable to reach. Left voicemail. Advised patient to call clinic back at [phone].    My chart sent     Lianne Sanchez RN

## 2024-02-14 ENCOUNTER — DOCUMENTATION ONLY (OUTPATIENT)
Dept: CARDIOLOGY | Facility: CLINIC | Age: 64
End: 2024-02-14
Payer: COMMERCIAL

## 2024-02-14 DIAGNOSIS — E78.5 HYPERLIPIDEMIA: ICD-10-CM

## 2024-02-14 DIAGNOSIS — Z00.6 EXAMINATION OF PARTICIPANT IN CLINICAL TRIAL: Primary | ICD-10-CM

## 2024-02-14 DIAGNOSIS — I21.11 ST ELEVATION MYOCARDIAL INFARCTION INVOLVING RIGHT CORONARY ARTERY (H): ICD-10-CM

## 2024-02-14 PROCEDURE — 99207 PR NO CHARGE-RESEARCH SERVICE: CPT | Performed by: INTERNAL MEDICINE

## 2024-02-14 NOTE — PROGRESS NOTES
Patient consented to the Olpasiran study and did not come in for his randomization visit. He did not respond to multiple phone calls or a letter to see if he would accept phone calls through the end of the study. He has not withdrawn consent so will do medical record reviews for outcomes.     Patient has not had any medical outcomes documented in medical records. Last contact was 1/19/2024 for a video appt with Dr Hurley. Will review again in 3 months.    Tiffany Boyce RN

## 2024-02-14 NOTE — PROGRESS NOTES
Late entry. Patient consented to the Olpasiran study and did not come in for his randomization visit. He did not respond to multiple phone calls or a letter to see if he would accept phone calls through the end of the study. He has not withdrawn consent so will do medical record reviews for outcomes.     Patient has had no outcomes documented in medical records. Last contact was 11/06/2023 for a mediation refill. Will review records in 2 months per protocol schedule.    Tiffany Boyce RN

## 2024-02-20 ENCOUNTER — OFFICE VISIT (OUTPATIENT)
Dept: FAMILY MEDICINE | Facility: CLINIC | Age: 64
End: 2024-02-20
Payer: COMMERCIAL

## 2024-02-20 VITALS
SYSTOLIC BLOOD PRESSURE: 134 MMHG | HEIGHT: 67 IN | RESPIRATION RATE: 16 BRPM | TEMPERATURE: 98.3 F | WEIGHT: 168 LBS | OXYGEN SATURATION: 97 % | DIASTOLIC BLOOD PRESSURE: 86 MMHG | HEART RATE: 78 BPM | BODY MASS INDEX: 26.37 KG/M2

## 2024-02-20 DIAGNOSIS — I10 BENIGN ESSENTIAL HYPERTENSION: ICD-10-CM

## 2024-02-20 DIAGNOSIS — I50.20 HEART FAILURE WITH REDUCED EJECTION FRACTION, NYHA CLASS III (H): ICD-10-CM

## 2024-02-20 DIAGNOSIS — F41.9 CHRONIC ANXIETY: ICD-10-CM

## 2024-02-20 DIAGNOSIS — G47.00 PERSISTENT INSOMNIA: ICD-10-CM

## 2024-02-20 DIAGNOSIS — Z87.891 PERSONAL HISTORY OF NICOTINE DEPENDENCE: ICD-10-CM

## 2024-02-20 DIAGNOSIS — Z13.220 LIPID SCREENING: ICD-10-CM

## 2024-02-20 DIAGNOSIS — E78.5 HYPERLIPIDEMIA, UNSPECIFIED HYPERLIPIDEMIA TYPE: ICD-10-CM

## 2024-02-20 DIAGNOSIS — Z72.0 TOBACCO ABUSE: ICD-10-CM

## 2024-02-20 PROCEDURE — G0008 ADMIN INFLUENZA VIRUS VAC: HCPCS | Performed by: FAMILY MEDICINE

## 2024-02-20 PROCEDURE — 90480 ADMN SARSCOV2 VAC 1/ONLY CMP: CPT | Performed by: FAMILY MEDICINE

## 2024-02-20 PROCEDURE — 99214 OFFICE O/P EST MOD 30 MIN: CPT | Mod: 25 | Performed by: FAMILY MEDICINE

## 2024-02-20 PROCEDURE — 90682 RIV4 VACC RECOMBINANT DNA IM: CPT | Performed by: FAMILY MEDICINE

## 2024-02-20 PROCEDURE — 91320 SARSCV2 VAC 30MCG TRS-SUC IM: CPT | Performed by: FAMILY MEDICINE

## 2024-02-20 RX ORDER — SPIRONOLACTONE 25 MG/1
25 TABLET ORAL DAILY
Qty: 90 TABLET | Refills: 1 | Status: SHIPPED | OUTPATIENT
Start: 2024-02-20

## 2024-02-20 RX ORDER — QUETIAPINE FUMARATE 100 MG/1
100 TABLET, FILM COATED ORAL AT BEDTIME
Qty: 90 TABLET | Refills: 1 | Status: SHIPPED | OUTPATIENT
Start: 2024-02-20 | End: 2024-08-13

## 2024-02-20 RX ORDER — LISINOPRIL 20 MG/1
20 TABLET ORAL DAILY
Qty: 90 TABLET | Refills: 1 | Status: SHIPPED | OUTPATIENT
Start: 2024-02-20

## 2024-02-20 RX ORDER — CARVEDILOL 25 MG/1
25 TABLET ORAL 2 TIMES DAILY WITH MEALS
Qty: 180 TABLET | Refills: 1 | Status: SHIPPED | OUTPATIENT
Start: 2024-02-20

## 2024-02-20 RX ORDER — ATORVASTATIN CALCIUM 10 MG/1
10 TABLET, FILM COATED ORAL DAILY
Qty: 90 TABLET | Refills: 1 | Status: SHIPPED | OUTPATIENT
Start: 2024-02-20

## 2024-02-20 SDOH — HEALTH STABILITY: PHYSICAL HEALTH: ON AVERAGE, HOW MANY DAYS PER WEEK DO YOU ENGAGE IN MODERATE TO STRENUOUS EXERCISE (LIKE A BRISK WALK)?: 3 DAYS

## 2024-02-20 ASSESSMENT — SOCIAL DETERMINANTS OF HEALTH (SDOH)
IN A TYPICAL WEEK, HOW MANY TIMES DO YOU TALK ON THE PHONE WITH FAMILY, FRIENDS, OR NEIGHBORS?: MORE THAN THREE TIMES A WEEK
HOW OFTEN DO YOU GET TOGETHER WITH FRIENDS OR RELATIVES?: MORE THAN THREE TIMES A WEEK
HOW OFTEN DO YOU ATTENT MEETINGS OF THE CLUB OR ORGANIZATION YOU BELONG TO?: 1 TO 4 TIMES PER YEAR
IN A TYPICAL WEEK, HOW MANY TIMES DO YOU TALK ON THE PHONE WITH FAMILY, FRIENDS, OR NEIGHBORS?: MORE THAN THREE TIMES A WEEK
HOW OFTEN DO YOU ATTENT MEETINGS OF THE CLUB OR ORGANIZATION YOU BELONG TO?: 1 TO 4 TIMES PER YEAR
DO YOU BELONG TO ANY CLUBS OR ORGANIZATIONS SUCH AS CHURCH GROUPS UNIONS, FRATERNAL OR ATHLETIC GROUPS, OR SCHOOL GROUPS?: YES
DO YOU BELONG TO ANY CLUBS OR ORGANIZATIONS SUCH AS CHURCH GROUPS UNIONS, FRATERNAL OR ATHLETIC GROUPS, OR SCHOOL GROUPS?: YES
HOW OFTEN DO YOU GET TOGETHER WITH FRIENDS OR RELATIVES?: THREE TIMES A WEEK
HOW OFTEN DO YOU ATTEND CHURCH OR RELIGIOUS SERVICES?: 1 TO 4 TIMES PER YEAR
HOW OFTEN DO YOU ATTEND CHURCH OR RELIGIOUS SERVICES?: 1 TO 4 TIMES PER YEAR
HOW OFTEN DO YOU GET TOGETHER WITH FRIENDS OR RELATIVES?: MORE THAN THREE TIMES A WEEK

## 2024-02-20 ASSESSMENT — LIFESTYLE VARIABLES
HOW MANY STANDARD DRINKS CONTAINING ALCOHOL DO YOU HAVE ON A TYPICAL DAY: 3 OR 4
HOW OFTEN DO YOU HAVE A DRINK CONTAINING ALCOHOL: 2-3 TIMES A WEEK
SKIP TO QUESTIONS 9-10: 0
HOW OFTEN DO YOU HAVE SIX OR MORE DRINKS ON ONE OCCASION: LESS THAN MONTHLY
AUDIT-C TOTAL SCORE: 5

## 2024-02-20 NOTE — PROGRESS NOTES
Assessment & Plan     (E78.5) Hyperlipidemia, unspecified hyperlipidemia type  Comment: Recommend to continue Lipitor   Will check lipid panel .  Plan: Lipid panel reflex to direct LDL Fasting,         Comprehensive metabolic panel (BMP + Alb, Alk         Phos, ALT, AST, Total. Bili, TP), atorvastatin         (LIPITOR) 10 MG tablet            (I50.20) Heart failure with reduced ejection fraction, NYHA class III (H)  Comment: Discussed cardiology follow up .  Number given to schedule .  Number given to schedule   Recommend to continue current medication .   Discussed importance of quitting smoking and quitting alcohol .    Plan: carvedilol (COREG) 25 MG tablet, spironolactone        (ALDACTONE) 25 MG tablet            (Z72.0) Tobacco abuse  Comment: discussed in detail quitting different means of quitting .    Plan: CT Chest Lung Cancer Scrn Low Dose wo            (Z87.891) Personal history of nicotine dependence  Comment:   Plan: CT Chest Lung Cancer Scrn Low Dose wo            (I10) Benign essential hypertension  Comment:   Plan: lisinopril (ZESTRIL) 20 MG tablet            (G47.00) Persistent insomnia  Comment:   Plan: QUEtiapine (SEROQUEL) 100 MG tablet            (F41.9) Chronic anxiety  Comment:   Plan: QUEtiapine (SEROQUEL) 100 MG tablet            (Z13.220) Lipid screening  Screening lipid       Subjective   Alfa is a 63 year old, presenting for the following health issues:    Hypertension, Anxiety (With insomnia), and Hyperlipidemia        2/20/2024     2:50 PM   Additional Questions   Roomed by Kenia SPRINGER       Hyperlipidemia Follow-Up    Are you regularly taking any medication or supplement to lower your cholesterol?   Yes- atorvastatin  Are you having muscle aches or other side effects that you think could be caused by your cholesterol lowering medication?  No    Hypertension Follow-up    Do you check your blood pressure regularly outside of the clinic? Yes   Are you following a low salt diet?  Yes  Are your blood pressures ever more than 140 on the top number (systolic) OR more   than 90 on the bottom number (diastolic), for example 140/90? No    Anxiety Follow-Up  How are you doing with your anxiety since your last visit? No change  Are you having other symptoms that might be associated with anxiety? No  Have you had a significant life event? No   Are you feeling depressed? No  Do you have any concerns with your use of alcohol or other drugs? No    Social History     Tobacco Use    Smoking status: Every Day     Packs/day: .25     Types: Cigarettes    Smokeless tobacco: Never    Tobacco comments:     quit date 10/16/17   Vaping Use    Vaping Use: Never used   Substance Use Topics    Alcohol use: Not Currently     Alcohol/week: 20.0 standard drinks of alcohol     Types: 20 Cans of beer per week    Drug use: No         6/1/2020     7:41 AM 3/24/2021     2:49 PM   KANDACE-7 SCORE   Total Score  3 (minimal anxiety)   Total Score 1 3          No data to display                    Review of Systems  CONSTITUTIONAL: NEGATIVE for fever, chills, change in weight  INTEGUMENTARY/SKIN: NEGATIVE for worrisome rashes, moles or lesions  EYES: NEGATIVE for vision changes or irritation  ENT/MOUTH: NEGATIVE for ear, mouth and throat problems  RESP: NEGATIVE for significant cough or SOB  BREAST: NEGATIVE for masses, tenderness or discharge  CV: NEGATIVE for chest pain, palpitations or peripheral edema  GI: NEGATIVE for nausea, abdominal pain, heartburn, or change in bowel habits  : NEGATIVE for frequency, dysuria, or hematuria  MUSCULOSKELETAL: NEGATIVE for significant arthralgias or myalgia  NEURO: NEGATIVE for weakness, dizziness or paresthesias  ENDOCRINE: NEGATIVE for temperature intolerance, skin/hair changes  HEME: NEGATIVE for bleeding problems  PSYCHIATRIC: NEGATIVE for changes in mood or affect      Objective    /86 (Cuff Size: Adult Regular)   Pulse 78   Temp 98.3  F (36.8  C) (Oral)   Resp 16   Ht 1.702 m  "(5' 7\")   Wt 76.2 kg (168 lb)   SpO2 97%   BMI 26.31 kg/m    Body mass index is 26.31 kg/m .  Physical Exam   GENERAL: alert and no distress  EYES: Eyes grossly normal to inspection, PERRL and conjunctivae and sclerae normal  HENT: ear canals and TM's normal, nose and mouth without ulcers or lesions  NECK: no adenopathy, no asymmetry, masses, or scars  RESP: lungs clear to auscultation - no rales, rhonchi or wheezes  CV: regular rate and rhythm, normal S1 S2, no S3 or S4, no murmur, click or rub, no peripheral edema  ABDOMEN: soft, nontender, no hepatosplenomegaly, no masses and bowel sounds normal  MS: no gross musculoskeletal defects noted, no edema  SKIN: no suspicious lesions or rashes  NEURO: Normal strength and tone, mentation intact and speech normal  PSYCH: mentation appears normal, affect normal/bright speech slow .            Signed Electronically by: Louisa Hurley MD    "

## 2024-02-21 ENCOUNTER — PATIENT OUTREACH (OUTPATIENT)
Dept: CARE COORDINATION | Facility: CLINIC | Age: 64
End: 2024-02-21
Payer: COMMERCIAL

## 2024-03-06 ENCOUNTER — PATIENT OUTREACH (OUTPATIENT)
Dept: CARE COORDINATION | Facility: CLINIC | Age: 64
End: 2024-03-06
Payer: COMMERCIAL

## 2024-03-17 ENCOUNTER — MYC MEDICAL ADVICE (OUTPATIENT)
Dept: FAMILY MEDICINE | Facility: CLINIC | Age: 64
End: 2024-03-17
Payer: COMMERCIAL

## 2024-04-10 ENCOUNTER — DOCUMENTATION ONLY (OUTPATIENT)
Dept: CARDIOLOGY | Facility: CLINIC | Age: 64
End: 2024-04-10
Payer: COMMERCIAL

## 2024-04-10 DIAGNOSIS — Z00.6 EXAMINATION OF PARTICIPANT IN CLINICAL TRIAL: Primary | ICD-10-CM

## 2024-04-10 DIAGNOSIS — I21.11 ST ELEVATION MYOCARDIAL INFARCTION INVOLVING RIGHT CORONARY ARTERY (H): ICD-10-CM

## 2024-04-10 DIAGNOSIS — E78.5 HYPERLIPIDEMIA: ICD-10-CM

## 2024-04-10 PROCEDURE — 99207 PR NO CHARGE-RESEARCH SERVICE: CPT | Performed by: INTERNAL MEDICINE

## 2024-04-10 NOTE — PROGRESS NOTES
Patient did not come in for randomization to the Olpasiran study and does not return phone calls or letters sent to him. Medical record review completed for endpoints, CARROLL's. Patient has been seen in cardiology sine my last review. He has had no Endpoints or CARROLL's for the study.     Will review again in 3 months per protocol.  Tiffany Boyce RN

## 2024-05-15 ENCOUNTER — TELEPHONE (OUTPATIENT)
Dept: FAMILY MEDICINE | Facility: CLINIC | Age: 64
End: 2024-05-15
Payer: COMMERCIAL

## 2024-05-15 NOTE — LETTER
May 15, 2024      Alfa Cm  [address]  [facility] [address]        Dear Alfa,     You are due for follow up with Dr Hurley in June.     You can make an appointment through my chart or by calling the clinic at [phone]       Sincerely,      Lianne Sanchez RN

## 2024-06-02 ENCOUNTER — HEALTH MAINTENANCE LETTER (OUTPATIENT)
Age: 64
End: 2024-06-02

## 2024-06-12 ENCOUNTER — TELEPHONE (OUTPATIENT)
Dept: FAMILY MEDICINE | Facility: CLINIC | Age: 64
End: 2024-06-12
Payer: COMMERCIAL

## 2024-06-12 NOTE — LETTER
June 12, 2024      Alfa Cm  [address]  [facility] [address]        Dear Alfa,     You are due for your 6 month in clinic follow up with me.      You can make an appointment through my chart or by calling the clinic at [phone].    If you have any questions please call the clinic at the number above.         Sincerely,        Louisa Hurley MD/amp

## 2024-06-27 ENCOUNTER — DOCUMENTATION ONLY (OUTPATIENT)
Dept: CARDIOLOGY | Facility: CLINIC | Age: 64
End: 2024-06-27
Payer: COMMERCIAL

## 2024-06-27 DIAGNOSIS — I21.11 ST ELEVATION MYOCARDIAL INFARCTION INVOLVING RIGHT CORONARY ARTERY (H): ICD-10-CM

## 2024-06-27 DIAGNOSIS — Z00.6 EXAMINATION OF PARTICIPANT IN CLINICAL TRIAL: Primary | ICD-10-CM

## 2024-06-27 DIAGNOSIS — E78.5 HYPERLIPIDEMIA: ICD-10-CM

## 2024-06-27 PROCEDURE — 99207 PR NO CHARGE-RESEARCH SERVICE: CPT | Performed by: INTERNAL MEDICINE

## 2024-06-27 NOTE — PROGRESS NOTES
Medical record review completed for week 36 Olpasiran off study. There have been no office visits, AE's, CARROLL's or endpoints noted. Patient has requested a med refill on and received notification through [facility] that this is denied. No other noted or MD visits outside of [place] found.       Tiffany Boyce RN

## 2024-08-13 DIAGNOSIS — G47.00 PERSISTENT INSOMNIA: ICD-10-CM

## 2024-08-13 DIAGNOSIS — F41.9 CHRONIC ANXIETY: ICD-10-CM

## 2024-08-13 RX ORDER — QUETIAPINE FUMARATE 100 MG/1
100 TABLET, FILM COATED ORAL AT BEDTIME
Qty: 90 TABLET | Refills: 0 | Status: SHIPPED | OUTPATIENT
Start: 2024-08-13

## 2024-09-19 ENCOUNTER — DOCUMENTATION ONLY (OUTPATIENT)
Dept: CARDIOLOGY | Facility: CLINIC | Age: 64
End: 2024-09-19
Payer: COMMERCIAL

## 2024-09-19 DIAGNOSIS — I21.11 ST ELEVATION MYOCARDIAL INFARCTION INVOLVING RIGHT CORONARY ARTERY (H): Primary | ICD-10-CM

## 2024-09-19 DIAGNOSIS — Z00.6 EXAMINATION OF PARTICIPANT IN CLINICAL TRIAL: ICD-10-CM

## 2024-09-19 DIAGNOSIS — E78.5 HYPERLIPIDEMIA: ICD-10-CM

## 2024-09-19 PROCEDURE — 99207 PR NO CHARGE-RESEARCH SERVICE: CPT | Performed by: INTERNAL MEDICINE

## 2024-09-19 NOTE — PROGRESS NOTES
A Double-blind, Randomized, Placebo-controlled, Multicenter Study Assessing the impact of Olpasiran on Major Cardiovascular Events in Patients with Atherosclerotic cardiovascular Disease and Elevated Lipoprotein (a)    Medical record review completed for week 48 Olpasiran off study. There have been no office visits, AE's, CARROLL's or endpoints noted. No other MD visits outside of [place] found.    Any Adverse events, Serious Adverse event, changes in medications,  (if any) listed below. NONE     Did the patient have any of the following events (endpoints):     No Cardiovascular Death  No Myocardial Infarction  No Coronary revascularization  No  Any Coronary Artery Revascularization  No  Cerebrovascular Revascularization  No  Peripheral Artery Revascularization  No  Vascular amputation  Noo  Hospitalization for Unstable Angina  No Stroke (Ischemic or Hemorrhagic) / Transient Ischemic Attack (TIA)  No Deep Vein Thrombosis or Pulmonary Embolism  NoNew onset or clinical deterioration of aortic stenosis  No  Limb Ischemia  No  New Onset Diabetes Mellitus       Tiffany Boyce RN      Current Outpatient Medications:     Aspirin 81 MG CAPS, Take 81 mg by mouth daily, Disp: 90 capsule, Rfl: 1    atorvastatin (LIPITOR) 10 MG tablet, Take 1 tablet (10 mg) by mouth daily, Disp: 90 tablet, Rfl: 1    carvedilol (COREG) 25 MG tablet, Take 1 tablet (25 mg) by mouth 2 times daily (with meals), Disp: 180 tablet, Rfl: 1    Fluticasone-Umeclidin-Vilanterol (TRELEGY ELLIPTA) 100-62.5-25 MCG/INH oral inhaler, Inhale 1 puff into the lungs daily, Disp: 60 each, Rfl: 3    lisinopril (ZESTRIL) 20 MG tablet, Take 1 tablet (20 mg) by mouth daily, Disp: 90 tablet, Rfl: 1    multivitamin, therapeutic (THERA-VIT) TABS tablet, Take 1 tablet by mouth daily, Disp: , Rfl:     QUEtiapine (SEROQUEL) 100 MG tablet, TAKE 1 TABLET BY MOUTH AT BEDTIME, Disp: 90 tablet, Rfl: 0    spironolactone (ALDACTONE) 25 MG tablet, Take 1 tablet (25 mg) by mouth daily,  Disp: 90 tablet, Rfl: 1

## 2024-11-13 DIAGNOSIS — G47.00 PERSISTENT INSOMNIA: ICD-10-CM

## 2024-11-13 DIAGNOSIS — F41.9 CHRONIC ANXIETY: ICD-10-CM

## 2024-11-13 RX ORDER — QUETIAPINE FUMARATE 100 MG/1
100 TABLET, FILM COATED ORAL AT BEDTIME
Qty: 90 TABLET | Refills: 0 | Status: SHIPPED | OUTPATIENT
Start: 2024-11-13

## 2024-12-18 ENCOUNTER — DOCUMENTATION ONLY (OUTPATIENT)
Dept: CARDIOLOGY | Facility: CLINIC | Age: 64
End: 2024-12-18
Payer: COMMERCIAL

## 2024-12-18 NOTE — PROGRESS NOTES
A Double-blind, Randomized, Placebo-controlled, Multicenter Study Assessing the impact of Olpasiran on Major Cardiovascular Events in Patients with Atherosclerotic cardiovascular Disease and Elevated Lipoprotein (a)        Alfa Schaefferon's medical records were reviewed for Visit Week 60    Any Adverse events, Serious Adverse event, changes in medications,  (if any) listed below. Unable to review, patient has moved to [place] and there are no records available. He appears to be alive.    Did the patient have any of the following events (endpoints):  Unable to document due to no medical records available.  [] Yes   []  No   Cardiovascular Death  [] Yes   []  No   Myocardial Infarction  [] Yes   []  No   Coronary revascularization  [] Yes   []  No   Any Coronary Artery Revascularization  [] Yes   []  No  Cerebrovascular Revascularization  [] Yes   []  No  Peripheral Artery Revascularization  [] Yes   []  No  Vascular amputation  [] Yes   []  No  Hospitalization for Unstable Angina  [] Yes   []  No  Stroke (Ischemic or Hemorrhagic) / Transient Ischemic Attack (TIA)  [] Yes   []  No  Deep Vein Thrombosis or Pulmonary Embolism  [] Yes   []  No  New onset or clinical deterioration of aortic stenosis  [] Yes   []  No  Limb Ischemia  [] Yes   []  No  New Onset Diabetes Mellitus   [] Yes   []  No  Hospitalization for Heart Failure    Plan: Med record review 3 months per protocol.    Tiffany Boyce RN      Current Outpatient Medications:     Aspirin 81 MG CAPS, Take 81 mg by mouth daily, Disp: 90 capsule, Rfl: 1    atorvastatin (LIPITOR) 10 MG tablet, Take 1 tablet (10 mg) by mouth daily, Disp: 90 tablet, Rfl: 1    carvedilol (COREG) 25 MG tablet, Take 1 tablet (25 mg) by mouth 2 times daily (with meals), Disp: 180 tablet, Rfl: 1    Fluticasone-Umeclidin-Vilanterol (TRELEGY ELLIPTA) 100-62.5-25 MCG/INH oral inhaler, Inhale 1 puff into the lungs daily, Disp: 60 each, Rfl: 3    lisinopril (ZESTRIL) 20 MG tablet, Take 1  tablet (20 mg) by mouth daily, Disp: 90 tablet, Rfl: 1    multivitamin, therapeutic (THERA-VIT) TABS tablet, Take 1 tablet by mouth daily, Disp: , Rfl:     QUEtiapine (SEROQUEL) 100 MG tablet, TAKE 1 TABLET BY MOUTH AT BEDTIME, Disp: 90 tablet, Rfl: 0    spironolactone (ALDACTONE) 25 MG tablet, Take 1 tablet (25 mg) by mouth daily, Disp: 90 tablet, Rfl: 1

## 2025-02-13 ENCOUNTER — OFFICE VISIT (OUTPATIENT)
Facility: CLINIC | Age: 65
End: 2025-02-13
Payer: MEDICARE

## 2025-02-13 VITALS
WEIGHT: 141.44 LBS | OXYGEN SATURATION: 98 % | HEART RATE: 82 BPM | BODY MASS INDEX: 22.2 KG/M2 | HEIGHT: 67 IN | TEMPERATURE: 98 F

## 2025-02-13 DIAGNOSIS — F43.21 GRIEF: ICD-10-CM

## 2025-02-13 DIAGNOSIS — K29.20 ACUTE ALCOHOLIC GASTRITIS WITHOUT HEMORRHAGE: ICD-10-CM

## 2025-02-13 DIAGNOSIS — F10.930 ALCOHOL WITHDRAWAL SYNDROME WITHOUT COMPLICATION: Primary | ICD-10-CM

## 2025-02-13 DIAGNOSIS — F10.90 ALCOHOL USE DISORDER: ICD-10-CM

## 2025-02-13 DIAGNOSIS — I25.10 CORONARY ARTERY DISEASE INVOLVING NATIVE CORONARY ARTERY OF NATIVE HEART WITHOUT ANGINA PECTORIS: ICD-10-CM

## 2025-02-13 DIAGNOSIS — R63.4 UNINTENTIONAL WEIGHT LOSS: ICD-10-CM

## 2025-02-13 DIAGNOSIS — K70.31 ALCOHOLIC CIRRHOSIS OF LIVER WITH ASCITES: ICD-10-CM

## 2025-02-13 DIAGNOSIS — S22.31XK: ICD-10-CM

## 2025-02-13 PROCEDURE — 99999 PR PBB SHADOW E&M-EST. PATIENT-LVL III: CPT | Mod: PBBFAC,,, | Performed by: HOSPITALIST

## 2025-02-13 RX ORDER — CARVEDILOL PHOSPHATE 20 MG/1
20 CAPSULE, EXTENDED RELEASE ORAL DAILY
COMMUNITY

## 2025-02-13 RX ORDER — QUETIAPINE FUMARATE 50 MG/1
50 TABLET, FILM COATED ORAL NIGHTLY
COMMUNITY

## 2025-02-13 NOTE — PROGRESS NOTES
"Primary Care Provider Appointment - [facility] & Jas Fleming MD  Initial Visit    Subjective:      Patient ID: Miguel North is a 64 y.o. male with   Past Medical History:   Diagnosis Date    Alcohol use disorder 02/13/2025    Alcoholic cirrhosis of liver with ascites 02/13/2025    Cirrhosis of liver     Coronary artery disease     stent about 5 y/a; has had about 2 MIs.    History of alcohol abuse     Hypertension            Chief Complaint: Weight Loss    Prior to this visit, patient's last encounter with PCP was Visit date not found.    Pt reports losing a lot of weight lately.  Over the past year went from 180 to 140 lbs unintentionally.  His family is worried about him and urged him to get evaluated.  Was on BP medication but stopped after losing weight and was getting dizzy due to low BP.  Had PCP in [place]; moved here about 6 m/a.  Denies excessive fatigue.  Having some sharp pains in lower abdomen.  Appetite has been poor.  Abd pain not related to eating.  No f/c/sweats but did have diaphoresis last night.  Quit drinking a couple days ago.  Drinking about a pint of liquor daily for about the past year.  Has been buying in bulk due to lack of a car since being here.   Doesn't like having that much alcohol on hand because he is prone to "overdo it."  Feeling jittery.  Has had shaking episodes when not drinking in past but denies being hospitalized for it.   No h/o withdrawal seizures.   Was in residential rehab for 30 days about a year ago.  Smoking 1/4-1/3 PPD; has cut down since MIs several years back.  Has a stent.    Taking 3-4 quetiapine 50mg nightly to sleep.  Has had a number of deaths in the family recently.    His wife arrives later in the appointment and provides additional hx; he was diagnosed with cirrhosis about 5 y/a.  He has been vomiting frequently for the past month or so.  He has also fallen a few times recently striking his ribs.    4Ms for Medical Decision-Making in " Older Adults    Last Completed EAWV:  None    MEDICATIONS:  High Risk Medications:  Total Active Medications: 0  This patient does not have an active medication from one of the medication groupers.    MOBILITY:  Activities of Daily Living:       No data to display              Fall Risk:       No data to display              Disability Status:       No data to display              Nutrition Screening:       No data to display             Screening Score: 0-7 Malnourished, 8-11 At Risk, 12-14 Normal  Get Up and Go:       No data to display              Whisper Test:       No data to display                    MENTATION:   Has Dementia Dx: No  Has Anxiety Dx: No    Depression Patient Health Questionnaire:       No data to display              Cognitive Function Screening:       No data to display              Cognitive Function Screening Total - Less than 4 = Abnormal,  Greater than or equal to 4 = Normal        WHAT MATTERS MOST:  Advance Care Planning   ACP Status:   Patient does not have an ACP conversation on file  Living Will: No  Power of : No  LaPOST: No    What is most important right now is to focus on symptom/pain control    Accordingly, we have decided that the best plan to meet the patient's goals includes continuing with treatment      What matters most to patient today is: investigating weight loss             Social History     Socioeconomic History    Marital status:        Review of Systems   Constitutional:  Positive for appetite change, diaphoresis and unexpected weight change. Negative for chills and fever.   Respiratory:  Negative for cough and shortness of breath.    Cardiovascular:  Negative for chest pain and leg swelling.   Gastrointestinal:  Positive for abdominal pain, nausea and vomiting. Negative for abdominal distention and change in bowel habit.   Neurological:  Positive for tremors. Negative for seizures.   Psychiatric/Behavioral:  Positive for depressed mood and sleep  "disturbance. The patient is nervous/anxious.         Objective:   Pulse 82   Temp 98.1 °F (36.7 °C) (Oral)   Ht 5' 7" (1.702 m)   Wt 64.1 kg (141 lb 6.8 oz)   SpO2 98%   BMI 22.15 kg/m²     Physical Exam  Vitals reviewed.   Constitutional:       Appearance: He is underweight. He is not diaphoretic.   HENT:      Head: Normocephalic and atraumatic.      Right Ear: Tympanic membrane, ear canal and external ear normal.      Left Ear: Tympanic membrane, ear canal and external ear normal.      Nose: Nose normal.      Mouth/Throat:      Mouth: Mucous membranes are moist.      Pharynx: Oropharynx is clear.      Comments: Very poor dentition.  Tongue fasciculations noted.  Eyes:      General: No scleral icterus.     Conjunctiva/sclera: Conjunctivae normal.   Cardiovascular:      Rate and Rhythm: Regular rhythm. Tachycardia present.      Pulses: Normal pulses.      Heart sounds: Normal heart sounds.   Pulmonary:      Effort: Pulmonary effort is normal.      Breath sounds: Normal breath sounds.   Chest:      Chest wall: Deformity and tenderness present.          Comments: Displaced rib fracture palpable anterior and inferior rib cage  Abdominal:      General: Abdomen is scaphoid. A surgical scar is present. Bowel sounds are normal.      Palpations: Abdomen is soft. There is fluid wave.      Tenderness: There is abdominal tenderness in the right upper quadrant. There is no guarding or rebound.      Hernia: No hernia is present.   Musculoskeletal:         General: Signs of injury (R rib fx as per chest exam) present. No swelling. Normal range of motion.      Cervical back: Normal range of motion. No rigidity.      Right lower leg: No edema.      Left lower leg: No edema.   Lymphadenopathy:      Cervical: No cervical adenopathy.   Skin:     General: Skin is warm and dry.   Neurological:      General: No focal deficit present.      Mental Status: He is alert and oriented to person, place, and time.      Motor: Tremor present. " "     Comments: Diffuse body tremors noted.   Psychiatric:      Comments: Anxious-appearing.  Is hesitant to discuss his drinking but is a bit more forthcoming with his wife providing corroborating information.              No results found for: "WBC", "HGB", "HCT", "PLT", "CHOL", "TRIG", "HDL", "LDLDIRECT", "ALT", "AST", "NA", "K", "CL", "CREATININE", "BUN", "CO2", "TSH", "PSA", "INR", "GLUF", "HGBA1C", "MICROALBUR"    Current Outpatient Medications on File Prior to Visit   Medication Sig Dispense Refill    CARVEDILOL PHOSPHATE 20 MG ORAL CM24 (COREG CR) 20 mg 24 hr capsule Take 20 mg by mouth once daily.      QUEtiapine (SEROQUEL) 50 MG tablet Take 50 mg by mouth every evening.       No current facility-administered medications on file prior to visit.         Assessment:   64 y.o. male with multiple co-morbid illnesses here to establish care.    Plan:     Problem List Items Addressed This Visit       Coronary artery disease involving native coronary artery of native heart without angina pectoris     He has two reported MIs with 1 successful angioplasty following the 1st but PCI with the 2nd was reportedly unsuccessful.  He is on carvedilol as monotherapy, and is notably not on any antiplatelet therapy; we will need to investigate if this might be due to increased bleeding risk in cirrhosis or possibly presence of esophageal varices.  We may also need to address the carvedilol given the increased risk of mortality with beta-blocker use in cirrhosis.         Unintentional weight loss     Considering history of heavy alcohol use and cirrhosis this is worrisome for malignancy such as HCC, esophageal, gastric, or pancreatic malignancy.  He will likely need a CT scan or ultrasound of the abdomen to evaluate further.         Alcohol withdrawal syndrome without complication - Primary     Abrupt cessation of alcohol about 2 days previously with progressive tremulousness.  He denies prior history of DTs or seizures, but he " was counseled about his high-risk of developing these potentially life-threatening complications without inpatient treatment.  He was instructed to present to the emergency department as soon as possible and he will have a friend or relative bring him.  As his oral intake has been poor for awhile and he has been subsisting on alcohol, he is at risk for electrolyte derangements or Wernicke-Korsakoff syndrome if not appropriately treated prior to resuming oral intake.         Relevant Orders    Refer to Emergency Dept.    Alcoholic cirrhosis of liver with ascites     Patient counseled on the extremely important need to stop alcohol consumption to prevent further toxicity and organ dysfunction.  He was also counseled on the risk of developing liver cancer in the context of cirrhosis, which continued alcohol use can further increase.  Exam consistent with mild ascites; we will need laboratory values to calculate MELD and further prognosticate.         Alcohol use disorder     Longstanding problem drinking which has likely culminated in alcoholic cirrhosis.  He has reported going through residential rehab before; he was advised that he will have access to addiction specialists and potentially other rehab resources from the hospital once his withdrawal is resolved.         Acute alcoholic gastritis without hemorrhage     Recent onset of vomiting and abdominal pain consistent with alcoholic gastritis.  He was counseled that with symptomatic management and cessation of alcohol this should improve.         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         Grief     He admits difficulty dealing with recent deaths of family members which is likely driving the excessive alcohol consumption.  We will plan on exploring grief counseling resources at follow-up.            Health Maintenance         Date Due Completion Date     Hepatitis C Screening Never done ---    Lipid Panel Never done ---    HIV Screening Never done ---    TETANUS VACCINE Never done ---    Colorectal Cancer Screening Never done ---    Shingles Vaccine (1 of 2) Never done ---    Pneumococcal Vaccines (Age 50+) (1 of 1 - PCV) Never done ---    Influenza Vaccine (1) Never done ---    COVID-19 Vaccine (1 - 2024-25 season) Never done ---    RSV Vaccine (Age 60+ and Pregnant patients) (1 - 1-dose 75+ series) 07/10/2037 ---            No future appointments.      Follow up in about 2 weeks (around 2/27/2025). Total clinical care time was 80 min.    Hugo Fleming MD  [facility] [employer] [facility] [employer]    This note was partially dictated using voice recognition software and although actively proofread during transcription, it is possible some errors in transcription may have been overlooked.

## 2025-02-13 NOTE — ASSESSMENT & PLAN NOTE
Abrupt cessation of alcohol about 2 days previously with progressive tremulousness.  He denies prior history of DTs or seizures, but he was counseled about his high-risk of developing these potentially life-threatening complications without inpatient treatment.  He was instructed to present to the emergency department as soon as possible and he will have a friend or relative bring him.  As his oral intake has been poor for awhile and he has been subsisting on alcohol, he is at risk for electrolyte derangements or Wernicke-Korsakoff syndrome if not appropriately treated prior to resuming oral intake.

## 2025-02-13 NOTE — ASSESSMENT & PLAN NOTE
He admits difficulty dealing with recent deaths of family members which is likely driving the excessive alcohol consumption.  We will plan on exploring grief counseling resources at follow-up.

## 2025-02-13 NOTE — ASSESSMENT & PLAN NOTE
Recent onset of vomiting and abdominal pain consistent with alcoholic gastritis.  He was counseled that with symptomatic management and cessation of alcohol this should improve.

## 2025-02-13 NOTE — ASSESSMENT & PLAN NOTE
Patient counseled on the extremely important need to stop alcohol consumption to prevent further toxicity and organ dysfunction.  He was also counseled on the risk of developing liver cancer in the context of cirrhosis, which continued alcohol use can further increase.  Exam consistent with mild ascites; we will need laboratory values to calculate MELD and further prognosticate.

## 2025-02-13 NOTE — PATIENT INSTRUCTIONS
Go to the emergency department at [facility] on [facility] to be treated for the alcohol withdrawal.  It is very important you go today as soon as you are able.

## 2025-02-13 NOTE — ASSESSMENT & PLAN NOTE
He has two reported MIs with 1 successful angioplasty following the 1st but PCI with the 2nd was reportedly unsuccessful.  He is on carvedilol as monotherapy, and is notably not on any antiplatelet therapy; we will need to investigate if this might be due to increased bleeding risk in cirrhosis or possibly presence of esophageal varices.  We may also need to address the carvedilol given the increased risk of mortality with beta-blocker use in cirrhosis.

## 2025-02-13 NOTE — ASSESSMENT & PLAN NOTE
Longstanding problem drinking which has likely culminated in alcoholic cirrhosis.  He has reported going through residential rehab before; he was advised that he will have access to addiction specialists and potentially other rehab resources from the hospital once his withdrawal is resolved.

## 2025-02-13 NOTE — ASSESSMENT & PLAN NOTE
Considering history of heavy alcohol use and cirrhosis this is worrisome for malignancy such as HCC, esophageal, gastric, or pancreatic malignancy.  He will likely need a CT scan or ultrasound of the abdomen to evaluate further.

## 2025-02-14 PROBLEM — R79.1 ELEVATED INR: Status: ACTIVE | Noted: 2025-02-14

## 2025-02-14 PROBLEM — D50.9 IRON DEFICIENCY ANEMIA: Status: ACTIVE | Noted: 2017-10-31

## 2025-02-14 PROBLEM — D64.9 ANEMIA: Status: ACTIVE | Noted: 2018-01-11

## 2025-02-24 ENCOUNTER — DOCUMENTATION ONLY (OUTPATIENT)
Dept: CARDIOLOGY | Facility: CLINIC | Age: 65
End: 2025-02-24
Payer: COMMERCIAL

## 2025-02-24 DIAGNOSIS — Z00.6 EXAMINATION OF PARTICIPANT IN CLINICAL TRIAL: ICD-10-CM

## 2025-02-24 DIAGNOSIS — I21.11 ST ELEVATION MYOCARDIAL INFARCTION INVOLVING RIGHT CORONARY ARTERY (H): Primary | ICD-10-CM

## 2025-02-24 DIAGNOSIS — E78.5 HYPERLIPIDEMIA: ICD-10-CM

## 2025-02-24 PROCEDURE — 99207 PR NO CHARGE-RESEARCH SERVICE: CPT | Performed by: INTERNAL MEDICINE

## 2025-02-24 NOTE — PROGRESS NOTES
[facility]  Subject [id number]    Medical History Diagnosis   Approx date of diagnosis or procedure  Severity  HYPERTENSION     [id number]     MODERATE  HYPERLIPIDEMIA     [id number]     MODERATE  STROKE      [id number]     RESOLVED  HEART FAILURE     [id number]     SEVERE    Tiffany Boyce RN

## 2025-03-07 ENCOUNTER — DOCUMENTATION ONLY (OUTPATIENT)
Dept: CARDIOLOGY | Facility: CLINIC | Age: 65
End: 2025-03-07
Payer: COMMERCIAL

## 2025-03-07 DIAGNOSIS — Z00.6 EXAMINATION OF PARTICIPANT IN CLINICAL TRIAL: ICD-10-CM

## 2025-03-07 DIAGNOSIS — E78.5 HYPERLIPIDEMIA: ICD-10-CM

## 2025-03-07 DIAGNOSIS — I21.11 ST ELEVATION MYOCARDIAL INFARCTION INVOLVING RIGHT CORONARY ARTERY (H): Primary | ICD-10-CM

## 2025-03-07 PROCEDURE — 99207 PR NO CHARGE-RESEARCH SERVICE: CPT | Performed by: INTERNAL MEDICINE

## 2025-03-12 NOTE — PROGRESS NOTES
A Double-blind, Randomized, Placebo-controlled, Multicenter Study Assessing the impact of Olpasiran on Major Cardiovascular Events in Patients with Atherosclerotic cardiovascular Disease and Elevated Lipoprotein (a)    Alfa ANABELA Gardeniatomy's medical records were reviewed for Visit Week 72     Any Adverse events, Serious Adverse event, changes in medications,  (if any) listed below. Unable to review, patient has moved to [place] and there are no records available. He appears to be alive through a google search for obituaries.     Did the patient have any of the following events (endpoints):   [] Yes   []  No   Cardiovascular Death  [] Yes   []  No   Myocardial Infarction  [] Yes   []  No   Coronary revascularization  [] Yes   []  No   Any Coronary Artery Revascularization  [] Yes   []  No  Cerebrovascular Revascularization  [] Yes   []  No  Peripheral Artery Revascularization  [] Yes   []  No  Vascular amputation  [] Yes   []  No  Hospitalization for Unstable Angina  [] Yes   []  No  Stroke (Ischemic or Hemorrhagic) / Transient Ischemic Attack (TIA)  [] Yes   []  No  Deep Vein Thrombosis or Pulmonary Embolism  [] Yes   []  No  New onset or clinical deterioration of aortic stenosis  [] Yes   []  No  Limb Ischemia  [] Yes   []  No  New Onset Diabetes Mellitus   [] Yes   []  No  Hospitalization for Heart Failure    Plan: Med record review in 3 months.    Tiffany Boyce, RN      Current Outpatient Medications:     Aspirin 81 MG CAPS, Take 81 mg by mouth daily, Disp: 90 capsule, Rfl: 1    atorvastatin (LIPITOR) 10 MG tablet, Take 1 tablet (10 mg) by mouth daily, Disp: 90 tablet, Rfl: 1    carvedilol (COREG) 25 MG tablet, Take 1 tablet (25 mg) by mouth 2 times daily (with meals), Disp: 180 tablet, Rfl: 1    Fluticasone-Umeclidin-Vilanterol (TRELEGY ELLIPTA) 100-62.5-25 MCG/INH oral inhaler, Inhale 1 puff into the lungs daily, Disp: 60 each, Rfl: 3    lisinopril (ZESTRIL) 20 MG tablet, Take 1 tablet (20 mg) by mouth daily,  Disp: 90 tablet, Rfl: 1    multivitamin, therapeutic (THERA-VIT) TABS tablet, Take 1 tablet by mouth daily, Disp: , Rfl:     QUEtiapine (SEROQUEL) 100 MG tablet, TAKE 1 TABLET BY MOUTH AT BEDTIME, Disp: 90 tablet, Rfl: 0    spironolactone (ALDACTONE) 25 MG tablet, Take 1 tablet (25 mg) by mouth daily, Disp: 90 tablet, Rfl: 1

## 2025-06-02 ENCOUNTER — DOCUMENTATION ONLY (OUTPATIENT)
Dept: CARDIOLOGY | Facility: CLINIC | Age: 65
End: 2025-06-02
Payer: COMMERCIAL

## 2025-06-02 DIAGNOSIS — I21.11 ST ELEVATION MYOCARDIAL INFARCTION INVOLVING RIGHT CORONARY ARTERY (H): Primary | ICD-10-CM

## 2025-06-02 DIAGNOSIS — E78.5 HYPERLIPIDEMIA: ICD-10-CM

## 2025-06-02 DIAGNOSIS — Z00.6 EXAMINATION OF PARTICIPANT IN CLINICAL TRIAL: ICD-10-CM

## 2025-06-02 PROCEDURE — 99207 PR NO CHARGE-RESEARCH SERVICE: CPT | Performed by: INTERNAL MEDICINE

## 2025-06-02 NOTE — PROGRESS NOTES
A Double-blind, Randomized, Placebo-controlled, Multicenter Study Assessing the impact of Olpasiran on Major Cardiovascular Events in Patients with Atherosclerotic cardiovascular Disease and Elevated Lipoprotein (a)    Alfa Varnerashiagabrielaon's medical records were reviewed for Visit Week 84     Any Adverse events, Serious Adverse event, changes in medications,  (if any) listed below. Unable to review, patient has moved to [place] and there are no records available. He appears to be alive through a google people search the address matches what is in [employer].     Did the patient have any of the following events (endpoints):   [] Yes   []  No   Cardiovascular Death  [] Yes   []  No   Myocardial Infarction  [] Yes   []  No   Coronary revascularization  [] Yes   []  No   Any Coronary Artery Revascularization  [] Yes   []  No  Cerebrovascular Revascularization  [] Yes   []  No  Peripheral Artery Revascularization  [] Yes   []  No  Vascular amputation  [] Yes   []  No  Hospitalization for Unstable Angina  [] Yes   []  No  Stroke (Ischemic or Hemorrhagic) / Transient Ischemic Attack (TIA)  [] Yes   []  No  Deep Vein Thrombosis or Pulmonary Embolism  [] Yes   []  No  New onset or clinical deterioration of aortic stenosis  [] Yes   []  No  Limb Ischemia  [] Yes   []  No  New Onset Diabetes Mellitus   [] Yes   []  No  Hospitalization for Heart Failure     Plan: Med record review in 3 months.    Tiffany Boyce RN      Current Outpatient Medications:     Aspirin 81 MG CAPS, Take 81 mg by mouth daily, Disp: 90 capsule, Rfl: 1    atorvastatin (LIPITOR) 10 MG tablet, Take 1 tablet (10 mg) by mouth daily, Disp: 90 tablet, Rfl: 1    carvedilol (COREG) 25 MG tablet, Take 1 tablet (25 mg) by mouth 2 times daily (with meals), Disp: 180 tablet, Rfl: 1    Fluticasone-Umeclidin-Vilanterol (TRELEGY ELLIPTA) 100-62.5-25 MCG/INH oral inhaler, Inhale 1 puff into the lungs daily, Disp: 60 each, Rfl: 3    lisinopril (ZESTRIL) 20 MG tablet, Take 1  tablet (20 mg) by mouth daily, Disp: 90 tablet, Rfl: 1    multivitamin, therapeutic (THERA-VIT) TABS tablet, Take 1 tablet by mouth daily, Disp: , Rfl:     QUEtiapine (SEROQUEL) 100 MG tablet, TAKE 1 TABLET BY MOUTH AT BEDTIME, Disp: 90 tablet, Rfl: 0    spironolactone (ALDACTONE) 25 MG tablet, Take 1 tablet (25 mg) by mouth daily, Disp: 90 tablet, Rfl: 1

## 2025-06-15 ENCOUNTER — HEALTH MAINTENANCE LETTER (OUTPATIENT)
Age: 65
End: 2025-06-15

## 2025-08-21 ENCOUNTER — DOCUMENTATION ONLY (OUTPATIENT)
Dept: CARDIOLOGY | Facility: CLINIC | Age: 65
End: 2025-08-21
Payer: COMMERCIAL

## 2025-08-21 DIAGNOSIS — I21.11 ST ELEVATION MYOCARDIAL INFARCTION INVOLVING RIGHT CORONARY ARTERY (H): ICD-10-CM

## 2025-08-21 DIAGNOSIS — Z00.6 EXAMINATION OF PARTICIPANT IN CLINICAL TRIAL: ICD-10-CM

## 2025-08-21 DIAGNOSIS — E78.5 HYPERLIPIDEMIA: Primary | ICD-10-CM

## (undated) DEVICE — ENDO SNARE EXACTO COLD 9MM LOOP 2.4MMX230CM 00711115

## (undated) DEVICE — KIT ENDO TURNOVER/PROCEDURE W/CLEAN A SCOPE LINERS 103888

## (undated) DEVICE — ENDO TRAP POLYP QUICK CATCH 710201

## (undated) DEVICE — ENDO FORCEP BX CAPTURA JUMBO SPIKE 2.8MMX230CM G53042

## (undated) RX ORDER — FENTANYL CITRATE 0.05 MG/ML
INJECTION, SOLUTION INTRAMUSCULAR; INTRAVENOUS
Status: DISPENSED
Start: 2022-02-01

## (undated) RX ORDER — REGADENOSON 0.08 MG/ML
INJECTION, SOLUTION INTRAVENOUS
Status: DISPENSED
Start: 2023-01-25